# Patient Record
Sex: MALE | Race: WHITE | NOT HISPANIC OR LATINO | Employment: UNEMPLOYED | URBAN - METROPOLITAN AREA
[De-identification: names, ages, dates, MRNs, and addresses within clinical notes are randomized per-mention and may not be internally consistent; named-entity substitution may affect disease eponyms.]

---

## 2017-02-19 ENCOUNTER — HOSPITAL ENCOUNTER (EMERGENCY)
Facility: HOSPITAL | Age: 22
Discharge: HOME | End: 2017-02-19
Attending: EMERGENCY MEDICINE | Admitting: EMERGENCY MEDICINE
Payer: COMMERCIAL

## 2017-02-19 VITALS
DIASTOLIC BLOOD PRESSURE: 79 MMHG | HEART RATE: 86 BPM | TEMPERATURE: 97.8 F | SYSTOLIC BLOOD PRESSURE: 149 MMHG | OXYGEN SATURATION: 100 % | RESPIRATION RATE: 17 BRPM | HEIGHT: 67 IN | BODY MASS INDEX: 20.28 KG/M2 | WEIGHT: 129.2 LBS

## 2017-02-19 DIAGNOSIS — H65.90 MIDDLE EAR EFFUSION: ICD-10-CM

## 2017-02-19 DIAGNOSIS — J30.9 ALLERGIC RHINITIS: Primary | ICD-10-CM

## 2017-02-19 DIAGNOSIS — B34.9 VIRAL SYNDROME: ICD-10-CM

## 2017-02-19 PROCEDURE — 99283 EMERGENCY DEPT VISIT LOW MDM: CPT

## 2017-02-19 RX ORDER — FLUTICASONE PROPIONATE 50 MCG
1 SPRAY, SUSPENSION (ML) NASAL DAILY
Qty: 1 BOTTLE | Refills: 0 | Status: SHIPPED | OUTPATIENT
Start: 2017-02-19 | End: 2017-06-20 | Stop reason: ALTCHOICE

## 2017-06-20 ENCOUNTER — HOSPITAL ENCOUNTER (EMERGENCY)
Facility: HOSPITAL | Age: 22
Discharge: HOME | End: 2017-06-20
Attending: EMERGENCY MEDICINE
Payer: MEDICARE

## 2017-06-20 VITALS
WEIGHT: 119 LBS | SYSTOLIC BLOOD PRESSURE: 120 MMHG | DIASTOLIC BLOOD PRESSURE: 59 MMHG | HEART RATE: 71 BPM | HEIGHT: 70 IN | TEMPERATURE: 98.9 F | OXYGEN SATURATION: 96 % | BODY MASS INDEX: 17.04 KG/M2 | RESPIRATION RATE: 16 BRPM

## 2017-06-20 DIAGNOSIS — L25.9 CONTACT DERMATITIS: Primary | ICD-10-CM

## 2017-06-20 PROCEDURE — 99282 EMERGENCY DEPT VISIT SF MDM: CPT

## 2017-06-20 RX ORDER — HYDROXYZINE HYDROCHLORIDE 25 MG/1
25 TABLET, FILM COATED ORAL EVERY 6 HOURS
Qty: 12 TABLET | Refills: 0 | Status: SHIPPED | OUTPATIENT
Start: 2017-06-20 | End: 2019-01-31 | Stop reason: ALTCHOICE

## 2017-06-20 RX ORDER — TRIAMCINOLONE ACETONIDE 1 MG/G
1 CREAM TOPICAL 2 TIMES DAILY
Qty: 30 G | Refills: 0 | Status: SHIPPED | OUTPATIENT
Start: 2017-06-20 | End: 2019-01-31 | Stop reason: ALTCHOICE

## 2017-11-29 ENCOUNTER — HOSPITAL ENCOUNTER (EMERGENCY)
Facility: HOSPITAL | Age: 22
Discharge: HOME | End: 2017-11-30
Attending: EMERGENCY MEDICINE | Admitting: EMERGENCY MEDICINE
Payer: MEDICARE

## 2017-11-29 VITALS
OXYGEN SATURATION: 100 % | BODY MASS INDEX: 18.68 KG/M2 | HEART RATE: 75 BPM | TEMPERATURE: 97.9 F | SYSTOLIC BLOOD PRESSURE: 126 MMHG | DIASTOLIC BLOOD PRESSURE: 67 MMHG | WEIGHT: 119 LBS | HEIGHT: 67 IN | RESPIRATION RATE: 16 BRPM

## 2017-11-29 DIAGNOSIS — S43.409A SHOULDER SPRAIN: ICD-10-CM
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
## 2017-11-29 DIAGNOSIS — M25.512 LEFT SHOULDER PAIN: ICD-10-CM

## 2017-11-29 RX ORDER — IBUPROFEN 600 MG/1
600 TABLET ORAL ONCE
Status: COMPLETED | OUTPATIENT
Start: 2017-11-30 | End: 2017-11-30

## 2017-11-30 ENCOUNTER — APPOINTMENT (EMERGENCY)
Dept: RADIOLOGY | Facility: HOSPITAL | Age: 22
End: 2017-11-30
Payer: MEDICARE

## 2017-11-30 PROCEDURE — 73030 X-RAY EXAM OF SHOULDER: CPT

## 2017-11-30 PROCEDURE — 73060 X-RAY EXAM OF HUMERUS: CPT

## 2017-11-30 PROCEDURE — 99283 EMERGENCY DEPT VISIT LOW MDM: CPT

## 2017-11-30 RX ADMIN — IBUPROFEN 600 MG: 600 TABLET ORAL at 00:09

## 2017-11-30 NOTE — ED PROVIDER NOTES
History  Chief Complaint   Patient presents with    Arm Pain     pt fell up the steps about an hour ago on fell on left sholder and is having pain     Patient is a 19-year-old male otherwise healthy presents today after a slip and fall at home  Patient states he was running down the stairs when he tripped and tried to reach for the rail but landed on his left shoulder  Patient did not strike his head or lose consciousness  He was able to get up and ambulate afterwards  Patient complains of pain to the left shoulder as well as left humerus region  He denies any paresthesias or weakness  Patient did not take any medication for this  Patient is right-handed  History provided by:  Patient   used: No        Prior to Admission Medications   Prescriptions Last Dose Informant Patient Reported? Taking?   hydrOXYzine HCL (ATARAX) 25 mg tablet   No No   Sig: Take 1 tablet by mouth every 6 (six) hours   triamcinolone (KENALOG) 0 1 % cream   No No   Sig: Apply 1 g topically 2 (two) times a day      Facility-Administered Medications: None       Past Medical History:   Diagnosis Date    Psychiatric disorder     anxiety       History reviewed  No pertinent surgical history  History reviewed  No pertinent family history  I have reviewed and agree with the history as documented  Social History   Substance Use Topics    Smoking status: Current Every Day Smoker     Packs/day: 0 50     Types: Cigarettes    Smokeless tobacco: Never Used    Alcohol use Yes        Review of Systems   Musculoskeletal: Negative for arthralgias, back pain, gait problem, joint swelling, myalgias, neck pain and neck stiffness         Physical Exam  ED Triage Vitals [11/29/17 2353]   Temperature Pulse Respirations Blood Pressure SpO2   97 9 °F (36 6 °C) 75 16 126/67 100 %      Temp Source Heart Rate Source Patient Position - Orthostatic VS BP Location FiO2 (%)   Oral Monitor Sitting Right arm --      Pain Score 8           Orthostatic Vital Signs  Vitals:    11/29/17 2353   BP: 126/67   Pulse: 75   Patient Position - Orthostatic VS: Sitting       Physical Exam   Constitutional: He is oriented to person, place, and time  He appears well-developed and well-nourished  No distress  HENT:   Head: Normocephalic and atraumatic  Eyes: EOM are normal  Pupils are equal, round, and reactive to light  Neck: Normal range of motion  Neck supple  No JVD present  No tracheal deviation present  Full active range of motion of the cervical spine pain-free  There is no step-offs  There is no tenderness to palpation of the cervical spine  Musculoskeletal:        Arms:  There is no tenderness throughout the thoracic spine as good lung sounds  There is no subcutaneous emphysema palpated    Patient has full active range of motion of the left elbow wrist and hand is pain-free  There is no bony tenderness upon palpation  Prep refills the left upper extremity is less than 2  Neurovascular intact  Good radial pulses bilaterally 2+  Neurological: He is alert and oriented to person, place, and time  He displays normal reflexes  No cranial nerve deficit  Coordination normal    Skin: He is not diaphoretic  Nursing note and vitals reviewed  ED Medications  Medications   ibuprofen (MOTRIN) tablet 600 mg (600 mg Oral Given 11/30/17 0009)       Diagnostic Studies  Results Reviewed     None                 XR shoulder 2+ views LEFT    (Results Pending)   XR humerus LEFT    (Results Pending)              Procedures  Procedures       Phone Contacts  ED Phone Contact    ED Course  ED Course                                MDM  Number of Diagnoses or Management Options  Fall, initial encounter: new and requires workup  Left shoulder pain: new and requires workup  Shoulder sprain: new and requires workup  Diagnosis management comments:   12:37 AM  Review of x-rays myself reveal no acute fracture identified  No pneumothorax    The lung markings on left  Will place patient in sling for comfort and refer to ortho  Patient updated on this  He remains neuro intact       Amount and/or Complexity of Data Reviewed  Tests in the radiology section of CPT®: ordered and reviewed  Independent visualization of images, tracings, or specimens: yes    Risk of Complications, Morbidity, and/or Mortality  Presenting problems: low  Diagnostic procedures: low  Management options: low    Patient Progress  Patient progress: stable    CritCare Time    Disposition  Final diagnoses:   Fall, initial encounter   Left shoulder pain   Shoulder sprain     Time reflects when diagnosis was documented in both MDM as applicable and the Disposition within this note     Time User Action Codes Description Comment    11/30/2017 12:38 AM Tanna Estevez Add [T06  NVYX] Fall, initial encounter     11/30/2017 12:38 AM Joe Dense Add [M25 512] Left shoulder pain     11/30/2017 12:38 AM Tanna Estevez Add [I15 [id number]] Shoulder sprain       ED Disposition     None      Follow-up Information     Follow up With Specialties Details Why [id number] [facility] Schedule an appointment as soon as possible for a visit in 2 days  [address]  [phone]        Patient's Medications   Discharge Prescriptions    No medications on file     No discharge procedures on file      ED Provider  Electronically Signed by           Clarice Saavedra DO  11/30/17 8354

## 2017-11-30 NOTE — DISCHARGE INSTRUCTIONS
Alternate Tylenol and Motrin for shoulder pain     Exercises for Internal and External Shoulder Rotation   WHAT YOU NEED TO KNOW:   What are internal and external shoulder rotation exercises? Exercises for internal shoulder rotation work the muscles in your chest and front of your shoulder  Exercises for external shoulder rotation work the muscles in the back of your shoulder and upper back  What should I do before I exercise? Warm up and stretch before you exercise  Walk or ride a stationary bike for 5 to 10 minutes to help you warm up  Stretching helps increase range of motion  It may also decrease muscle soreness and help prevent another injury  Your healthcare provider will tell you which of the following stretches to do:  · Crossover arm stretch:  Relax your shoulders  Hold your upper arm with the opposite hand  Pull your arm across your chest until you feel a stretch  Hold the stretch for 30 seconds  Return to the starting position  · Shoulder flexion stretch:  Stand facing a wall  Slowly walk your fingers up the wall until you feel a stretch  Hold the stretch for 30 seconds  Return to the starting position  · Sleeper stretch:  Lie on your injured side on a firm, flat surface  Bend the elbow of your injured arm 90° with your hand facing up  Use your arm that is not injured to slowly push your injured arm down  Stop when you feel a stretch at the back of your injured shoulder  Hold the stretch for 30 seconds  Slowly return to the starting position  How do I exercise with a weight? Your healthcare provider will tell you how much weight to exercise with  · Shoulder internal rotation:  Sit in a chair  Place a rolled up towel between your elbow and your side  Bend your elbow to 90°  Gently squeeze the towel with your elbow to prevent it from falling out  Hold the weight with your thumb pointing up  Slowly move the weight across your chest  Stop when your hand reaches your opposite arm   Hold this position for as many seconds as directed  Slowly return to the starting position  · Shoulder external rotation:  Lie on your side with your injured shoulder facing up  Bend your elbow 90°  Place a rolled up towel between your elbow and your side  Hold a weight in your hand  Gently squeeze the towel with your elbow to prevent it from falling out  Slowly rotate your arm outward, but keep your elbow bent  Stop when you feel a stretch  Hold this position for 30 seconds or as directed  Slowly return to the starting position  How do I exercise with an exercise band? · Shoulder internal rotation:  Tie one end of the exercise band to a heavy, secure object  Sit in a chair  Place a rolled up towel between your elbow and your side  Bend your elbow to 90°  Gently squeeze the towel with your elbow to prevent it from falling out  Slowly pull the band across your chest  Stop when your hand reaches your opposite arm  Hold this position for as many seconds as directed  Slowly return to the starting position  · Shoulder external rotation:  Hold one end of the exercise band on the side that is not injured  Place a rolled up towel between your elbow and your side  Bend your elbow 90°  Squeeze the towel with your elbow  Grab the end of the band and slowly turn your arm outward, but keep your elbow bent  Stop when you feel a stretch  Hold this position for 30 seconds or as directed  Slowly return to the starting position  When should I contact my healthcare provider? · You have sharp or worsening pain during exercise or at rest     · You have questions or concerns about your shoulder exercises  CARE AGREEMENT:   You have the right to help plan your care  Learn about your health condition and how it may be treated  Discuss treatment options with your caregivers to decide what care you want to receive  You always have the right to refuse treatment  The above information is an  only   It is not intended as medical advice for individual conditions or treatments  Talk to your doctor, nurse or pharmacist before following any medical regimen to see if it is safe and effective for you  © 2017 [facility] Information is for End User's use only and may not be sold, redistributed or otherwise used for commercial purposes  All illustrations and images included in CareNotes® are the copyrighted property of [employer]  or [employer]  How to Use a Sling   WHAT YOU NEED TO KNOW:   What is a sling? A sling is a strong fabric loop that hangs from your neck to support your arm  Your arm, bent at the elbow, rests in the sling  Some slings have a strap that goes down your back to take the weight off your neck  This strap is connected to the elbow side of the sling  Your healthcare provider will help you decide which sling is best for you and where you can get one  Why do I need a sling? You may need a sling because of an injury or surgery to your hand, wrist, arm, or shoulder  A sling helps prevent your hand, arm, and shoulder from moving so your injury can heal  A sling can also help if you have a heavy cast on your arm  How do I use the sling? Your healthcare provider will teach you how to put on your sling  Follow the directions below to help you put on the sling at home:  · Gently bend your injured arm at the elbow and hold it in front of you, across your body  Your thumb should be pointing up  · Put the strap of the sling around your neck  The strap usually has an adhesive fabric to make it easy for you to fasten the strap  · Put your arm in the sling so your elbow is in the closed end of the sling  Your hand will be at the open end of the sling  · Put the edge of the sling so it covers the first fold of the little finger  · Wiggle your fingers as directed to prevent stiffness in your hand  CARE AGREEMENT:   You have the right to help plan your care   Learn about your health condition and how it may be treated  Discuss treatment options with your caregivers to decide what care you want to receive  You always have the right to refuse treatment  The above information is an  only  It is not intended as medical advice for individual conditions or treatments  Talk to your doctor, nurse or pharmacist before following any medical regimen to see if it is safe and effective for you  © 2017 [address] [employer] Information is for End User's use only and may not be sold, redistributed or otherwise used for commercial purposes  All illustrations and images included in CareNotes® are the copyrighted property of [employer]  or [employer] Gentile  Shoulder Pain   WHAT YOU NEED TO KNOW:   What do I need to know about shoulder pain? Shoulder pain is a common problem and can affect your daily activities  Pain can be caused by a problem within your shoulder  Shoulder pain may also be caused by pain that spreads to your shoulder from another part of your body  What increases my risk for shoulder pain? · Repeated overhead activities, such as baseball, weight lifting, or swimming    · Medical conditions, such as rotator cuff disease, diabetes, or thyroid disorders    · A quick increase in the amount or intensity of exercises, or improper technique during exercise    · Muscle imbalance or weakness    · Trauma or a fall    · Age older than 61  How is shoulder pain diagnosed? Your healthcare provider will ask about your pain, including how and when it started  Tell him if you have any weakness or if there was an injury  He will examine your shoulder and do movement tests to see how well you can move your shoulder  You may also need any of the following tests:  · An x-ray, ultrasound, CT, or MRI  may be needed to show the cause of your shoulder pain  You may be given contrast liquid to help the shoulder area show up better in the pictures   Tell the healthcare provider if you have ever had an allergic reaction to contrast dye  Do not enter the MRI room with anything metal  Metal can cause serious injury  Tell the healthcare provider if you have any metal in or on your body  · An electromyography test  measures the electrical activity of your muscles at rest and with movement  How is shoulder pain treated? · Acetaminophen  decreases pain and fever  It is available without a doctor's order  Ask how much to take and how often to take it  Follow directions  Acetaminophen can cause liver damage if not taken correctly  · NSAIDs , such as ibuprofen, help decrease swelling, pain, and fever  This medicine is available with or without a doctor's order  NSAIDs can cause stomach bleeding or kidney problems in certain people  If you take blood thinner medicine, always ask your healthcare provider if NSAIDs are safe for you  Always read the medicine label and follow directions  · A steroid injection  may help decrease pain and swelling  · Surgery  may be needed for long-term pain and loss of function  How can I manage my symptoms? · Apply ice  on your shoulder for 20 to 30 minutes every 2 hours or as directed  Use an ice pack, or put crushed ice in a plastic bag  Cover it with a towel  Ice helps prevent tissue damage and decreases swelling and pain  · Apply heat if ice does not help your symptoms  Apply heat on your shoulder for 20 to 30 minutes every 2 hours for as many days as directed  Heat helps decrease pain and muscle spasms  · Go to physical or occupational therapy as directed  A physical therapist teaches you exercises to help improve movement and strength, and to decrease pain  An occupational therapist teaches you skills to help with your daily activities  How can I help prevent shoulder pain? · Stretch and strengthen your shoulder  Use proper technique during exercises and sports  · Limit activities as directed    Try to avoid repeated overhead movements  When should I seek immediate care or call 911? · You have severe pain  · You cannot move your arm or shoulder  · You have numbness or tingling in your shoulder or arm  When should I contact my healthcare provider? · Your pain gets worse or does not go away with treatment  · You have trouble moving your arm or shoulder  · You have questions or concerns about your condition or care  CARE AGREEMENT:   You have the right to help plan your care  Learn about your health condition and how it may be treated  Discuss treatment options with your caregivers to decide what care you want to receive  You always have the right to refuse treatment  The above information is an  only  It is not intended as medical advice for individual conditions or treatments  Talk to your doctor, nurse or pharmacist before following any medical regimen to see if it is safe and effective for you  © 2017 [address] [employer]  Information is for End User's use only and may not be sold, redistributed or otherwise used for commercial purposes  All illustrations and images included in CareNotes® are the copyrighted property of [employer]  or [employer] Gentile

## 2018-02-17 ENCOUNTER — HOSPITAL ENCOUNTER (EMERGENCY)
Facility: HOSPITAL | Age: 23
Discharge: HOME | End: 2018-02-17
Attending: EMERGENCY MEDICINE | Admitting: EMERGENCY MEDICINE
Payer: MEDICARE

## 2018-02-17 VITALS
SYSTOLIC BLOOD PRESSURE: 118 MMHG | RESPIRATION RATE: 16 BRPM | TEMPERATURE: 98.7 F | BODY MASS INDEX: 19.13 KG/M2 | OXYGEN SATURATION: 99 % | HEIGHT: 66 IN | HEART RATE: 75 BPM | WEIGHT: 119 LBS | DIASTOLIC BLOOD PRESSURE: 79 MMHG

## 2018-02-17 DIAGNOSIS — R21 RASH OF GENITALIA: Primary | ICD-10-CM

## 2018-02-17 PROCEDURE — 86592 SYPHILIS TEST NON-TREP QUAL: CPT | Performed by: EMERGENCY MEDICINE

## 2018-02-17 PROCEDURE — 36415 COLL VENOUS BLD VENIPUNCTURE: CPT | Performed by: EMERGENCY MEDICINE

## 2018-02-17 PROCEDURE — 99283 EMERGENCY DEPT VISIT LOW MDM: CPT

## 2018-02-17 PROCEDURE — 87255 GENET VIRUS ISOLATE HSV: CPT | Performed by: EMERGENCY MEDICINE

## 2018-02-17 NOTE — DISCHARGE INSTRUCTIONS
Please return if you developed worsening or other concerning symptoms otherwise follow up as instructed     Acute Rash   WHAT YOU NEED TO KNOW:   A rash is irritation, redness, or itchiness in the skin or mucus membranes  Mucus membranes are areas such as the lining of your nose or throat  Acute means the rash starts suddenly, worsens quickly, and lasts a short time  An acute rash may be caused by a disease, such as hepatitis or vasculitis  The rash may be a reaction to something you are allergic to, such as certain foods, or latex  Certain medicines, including antibiotics, NSAIDs, prescription pain medicine, and aspirin can also cause a rash  DISCHARGE INSTRUCTIONS:   Return to the emergency department if:   · You have sudden trouble breathing or chest pain  · You are vomiting, have a headache or muscle aches, and your throat hurts  Contact your healthcare provider if:   · You have a fever  · You get open wounds from scratching your skin, or you have a wound that is red, swollen, or painful  · Your rash lasts longer than 3 months  · You have swelling or pain in your joints  · You have questions or concerns about your condition or care  Medicines:  If your rash does not go away on its own, you may need the following medicines:  · Antihistamines  may be given to help decrease itching  · Steroids  may be given to decrease inflammation  · Antibiotics  help fight or prevent a bacterial infection  · Take your medicine as directed  Contact your healthcare provider if you think your medicine is not helping or if you have side effects  Tell him of her if you are allergic to any medicine  Keep a list of the medicines, vitamins, and herbs you take  Include the amounts, and when and why you take them  Bring the list or the pill bottles to follow-up visits  Carry your medicine list with you in case of an emergency    Prevent a rash or care for your skin when you have a rash:  Dry skin can lead to more problems  Do not scratch your skin if it itches  You may cause a skin infection by scratching  The following may prevent dry skin, and help your skin look better:  · Use thick cream lotions or petroleum jelly to help soothe your rash  These products work well on areas with thick skin, such as your feet  Cool compresses may also be used to soothe your skin  Apply a cool compress or a cool, wet towel, and then cover it with a dry towel  · Use lukewarm water when you bathe  Hot water may damage your skin more  Pat your skin dry  Do not rub your skin with a towel  · Use detergents, soaps, shampoos, and bubble baths made for sensitive skin  Wear clothes that are made of cotton instead of nylon or wool  Cotton is softer, so it will not hurt your skin as much  Follow up with your healthcare provider as directed: You may need to see a dermatologist if healthcare providers do not know what is causing your rash  You may also need to see a dermatologist if your rash does not get better even with treatment  You may need to see a dietitian if you have allergies to foods  Write down your questions so you remember to ask them during your visits  © 2017 [address] Information is for End User's use only and may not be sold, redistributed or otherwise used for commercial purposes  All illustrations and images included in CareNotes® are the copyrighted property of [employer]  or [employer]  The above information is an  only  It is not intended as medical advice for individual conditions or treatments  Talk to your doctor, nurse or pharmacist before following any medical regimen to see if it is safe and effective for you  Genital Warts   WHAT YOU NEED TO KNOW:   Genital warts are a sexually transmitted infection (STI) caused by the human papillomavirus (HPV)  Genital warts are growths that appear in or on the penis, vagina, or anus   Genital warts are spread during genital, anal, or oral sex  A woman can also pass them to a baby when she gives birth  DISCHARGE INSTRUCTIONS:   Contact your healthcare provider if:   · Your genital warts return  · The skin that is being treated for genital warts is very painful or swollen  · You see or feel new warts on another part of your body  · You have questions or concerns about your condition or care  Medicines:   · Immunomodulators  help strengthen your immune system and treat genital warts  · Antiproliferatives  help stop genital warts from growing in size or increasing in number  · Antivirals  help control and stop virus growth, such as HPV  · Take your medicine as directed  Contact your healthcare provider if you think your medicine is not helping or if you have side effects  Tell him or her if you are allergic to any medicine  Keep a list of the medicines, vitamins, and herbs you take  Include the amounts, and when and why you take them  Bring the list or the pill bottles to follow-up visits  Carry your medicine list with you in case of an emergency  Self-care:   · Do not touch or scratch the warts  This can cause the infection to spread to other parts of your body  · Do not have sex while you are being treated for genital warts  Medicine used on your skin weakens condoms and diaphragms  You also risk spreading genital warts to your partner  · Get regular Pap smears  If you are a woman, this can help diagnose HPV and prevent the spread of the virus  Prevent genital warts:   · Tell your sexual partners that you are being treated for genital warts  They may also be infected and need treatment  · Get the HPV vaccine  The HPV vaccine is given at 5to 32years of age to help prevent cervical cancer and genital warts  Ask your healthcare provider for more information about this vaccine    Follow up with your healthcare provider as directed:  Write down your questions so you remember to ask them during your visits  © 2017 [address] Information is for End User's use only and may not be sold, redistributed or otherwise used for commercial purposes  All illustrations and images included in CareNotes® are the copyrighted property of [employer] M [employer]  or [employer] Gentile  The above information is an  only  It is not intended as medical advice for individual conditions or treatments  Talk to your doctor, nurse or pharmacist before following any medical regimen to see if it is safe and effective for you  Common Wart   WHAT YOU NEED TO KNOW:   A common wart is a thick, rough, skin growth caused by human papillomavirus virus (HPV)  HPV is a germ that spreads by skin-to-skin contact or contact with contaminated surfaces  Common warts are benign (not cancer)  DISCHARGE INSTRUCTIONS:   Contact your healthcare provider or dermatologist if:   · Your wart returns or does not go away after treatment  · Your wart grows larger, or begins to spread or cluster  · You have a wart on your face, genitals, or rectum  · Your wart bleeds, becomes painful, or drains pus  · You have questions about your condition or care  Medicines:   · Salicylic acid  helps dry and remove the wart  It is available without a prescription  Ask your healthcare provider where you can purchase it  Before you apply salicylic acid, soak the wart in warm water for 20 minutes  Keep your wart damp  Apply a small amount of salicylic acid directly to your wart  Do not apply salicylic acid to healthy skin  Cover the wart as directed  It is best to do this at bedtime  When you wake, use a pumice stone (a rough stone) or nail file to gently remove dead skin  Repeat as directed  · Take your medicine as directed  Contact your healthcare provider if you think your medicine is not helping or if you have side effects  Tell him or her if you are allergic to any medicine   Keep a list of the medicines, vitamins, and herbs you take  Include the amounts, and when and why you take them  Bring the list or the pill bottles to follow-up visits  Carry your medicine list with you in case of an emergency  Apply duct tape to your wart as directed: Your healthcare provider may tell you to apply duct tape to your wart  Duct tape helps dry and remove the wart  You may be directed to leave the duct tape on for 6 days  On day 7, take the tape off and soak the wart in warm water for 5 minutes  Gently scrape the wart with a pumice stone or nail file  Then apply a new piece of duct tape and follow the same steps until the wart is gone  Follow up with your healthcare provider as directed:  Write down your questions so you remember to ask them during your visits  © 2017 [address] Information is for End User's use only and may not be sold, redistributed or otherwise used for commercial purposes  All illustrations and images included in CareNotes® are the copyrighted property of [employer] M [employer]  or Kumar Gentile  The above information is an  only  It is not intended as medical advice for individual conditions or treatments  Talk to your doctor, nurse or pharmacist before following any medical regimen to see if it is safe and effective for you

## 2018-02-17 NOTE — ED PROVIDER NOTES
History  Chief Complaint   Patient presents with    Rash     pt ststes this morning he noticed 'bumps" in his private area  59-year-old male without past medical history presenting with chief complaint of penile rash  Patient is 3 days ago he noticedlumps in his suprapubic area, he states that he had or these long time ago it was told this might be a skin tag, subsequently developed multiple similar lesions again localized to the suprapubic area only from the base of his penis into his mons pubis area, he believes they are skin tag the was concerned about sexually transmitted infections he states that he is monogamous with his wife his wife has no symptoms just had a child is test regularly, he has no other has systemic symptoms he has no other penile lesions, no testicle pain swelling inguinal adenopathy penile drainage urinary symptoms flank or back pain patient has no other complaints or concerns otherwise denies a complete review systems is noted            Prior to Admission Medications   Prescriptions Last Dose Informant Patient Reported? Taking?   hydrOXYzine HCL (ATARAX) 25 mg tablet   No No   Sig: Take 1 tablet by mouth every 6 (six) hours   triamcinolone (KENALOG) 0 1 % cream   No No   Sig: Apply 1 g topically 2 (two) times a day      Facility-Administered Medications: None       Past Medical History:   Diagnosis Date    Psychiatric disorder     anxiety       History reviewed  No pertinent surgical history  History reviewed  No pertinent family history  I have reviewed and agree with the history as documented  Social History   Substance Use Topics    Smoking status: Current Every Day Smoker     Packs/day: 0 50     Types: Cigarettes    Smokeless tobacco: Never Used    Alcohol use Yes        Review of Systems   Constitutional: Negative for chills and fever  HENT: Negative for rhinorrhea and sore throat  Eyes: Negative for photophobia and pain     Respiratory: Negative for cough and shortness of breath  Cardiovascular: Negative for chest pain and palpitations  Gastrointestinal: Negative for abdominal pain, diarrhea, nausea and vomiting  Genitourinary: Positive for genital sores  Negative for difficulty urinating, discharge, dysuria, flank pain, frequency, penile pain, penile swelling, scrotal swelling, testicular pain and urgency  Musculoskeletal: Negative for arthralgias, back pain and myalgias  Skin: Negative for color change, rash and wound  Allergic/Immunologic: Negative for immunocompromised state  Neurological: Negative for dizziness and weakness  Hematological: Negative for adenopathy  Does not bruise/bleed easily  Psychiatric/Behavioral: Negative for agitation and behavioral problems  All other systems reviewed and are negative  Physical Exam  ED Triage Vitals   Temperature Pulse Respirations Blood Pressure SpO2   02/16/18 2219 02/16/18 2219 02/16/18 2219 02/17/18 0143 02/16/18 2219   98 7 °F (37 1 °C) 79 18 118/79 99 %      Temp Source Heart Rate Source Patient Position - Orthostatic VS BP Location FiO2 (%)   02/16/18 2219 02/16/18 2219 02/16/18 2219 02/16/18 2219 --   Oral Monitor Sitting Left arm       Pain Score       02/16/18 2219       No Pain           Orthostatic Vital Signs  Vitals:    02/16/18 2219 02/17/18 0143   BP:  118/79   Pulse: 79 75   Patient Position - Orthostatic VS: Sitting Sitting       Physical Exam   Constitutional: He is oriented to person, place, and time  He appears well-developed and well-nourished  No distress  HENT:   Head: Normocephalic and atraumatic  Eyes: EOM are normal  Pupils are equal, round, and reactive to light  Neck: Normal range of motion  Neck supple  No tracheal deviation present  Cardiovascular: Normal rate, regular rhythm and normal heart sounds  Exam reveals no gallop and no friction rub  No murmur heard  Pulmonary/Chest: Effort normal and breath sounds normal  He has no wheezes  He has no rales  Abdominal: Soft  Bowel sounds are normal  He exhibits no distension  There is no tenderness  There is no rebound and no guarding  Genitourinary:   Genitourinary Comments: Patient wishes to expose the area of concern which she reports is the suprapubic area at the base of his penis where he shaves, again he has approximately 12-15 skin colored/to tan papular lesions that have verrucous appearance there is no adenopathy no other erythema folliculitis vesicles ulcers or other acute findings most concerning for warts   Musculoskeletal: Normal range of motion  He exhibits no edema or tenderness  Neurological: He is alert and oriented to person, place, and time  No cranial nerve deficit  He exhibits normal muscle tone  Coordination normal    Skin: Skin is warm and dry  No rash noted  Psychiatric: He has a normal mood and affect  His behavior is normal    Nursing note and vitals reviewed  ED Medications  Medications - No data to display    Diagnostic Studies  Results Reviewed     Procedure Component Value Units Date/Time    Herpes simplex virus culture [[id number]] Collected:  02/17/18 0118    Lab Status: In process Specimen:  Other from Lesion Updated:  02/17/18 0127    RPR [[id number]] Collected:  02/17/18 0118    Lab Status:   In process Specimen:  Blood from Arm, Right Updated:  02/17/18 0126                 No orders to display              Procedures  Procedures       Phone Contacts  ED Phone Contact    ED Course  ED Course as of Feb 17 1249   Sat Feb 17, 2018   0119 Patient has multiple small verrucous circular papular lesions in his suprapubic area no other general findings most consistent with warts,  RPR sent patient requested herpes swab counseled extensively on transmission clinical concern for warts, follow-up instructions patient agreeable to plan                                MDM  Number of Diagnoses or Management Options  Rash of genitalia:   Diagnosis management comments: 40-year-old male the noticed worsening skin colored rash in his suprapubic area where he shaves over the last 3 days concerned about sexually transmitted infections no other GI or  complaints no history of similar or other skin lesions on exam he is afebrile normal vital signs, he has approximately 12 to 15 tan/skin colored verrucous papular lesions in the suprapubic area only where he shaved, this is most consistent with likely warts, this is in the area of shaving only possibly spread from another site however discussed at length with patient clinical exam concerning for genital warts, unable to tell different clinically, counseled extensively on this transmission importance for follow-up, testing as wife safe sex practices, will test RPR for condyloma, patient request herpes testing although extremely low clinical suspicion, again most consistent with warts, history and physical exam is not consistent with herpes folliculitis, molluscum, etc, will discharge with Dermatology follow-up for treatment and management close return instructions, patient agreeable plan    CritCare Time    Disposition  Final diagnoses:   Rash of genitalia     Time reflects when diagnosis was documented in both MDM as applicable and the Disposition within this note     Time User Action Codes Description Comment    2/17/2018  1:26 AM Francine Gilmore Add [R21] Rash of genitalia       ED Disposition     ED Disposition Condition Comment    Discharge  Theo Roman discharge to home/self care      Condition at discharge: Good        Follow-up Information     Follow up With Specialties Details Why Contact Info Additional Information    [facility] Emergency Department Emergency Medicine  If symptoms worsen [address] [facility] [address]  [phone] [place] ED, [address] [place], [place], [address] Mali Pacheco MD Dermatology In 1 week  [address]  [phone] Discharge Medication List as of 2/17/2018  1:32 AM      CONTINUE these medications which have NOT CHANGED    Details   hydrOXYzine HCL (ATARAX) 25 mg tablet Take 1 tablet by mouth every 6 (six) hours, Starting Tue 6/20/2017, Print      triamcinolone (KENALOG) 0 1 % cream Apply 1 g topically 2 (two) times a day, Starting Tue 6/20/2017, Print           No discharge procedures on file      ED Provider  Electronically Signed by           Jarret Lord DO  02/17/18 0586

## 2018-02-18 LAB — RPR SER QL: NORMAL

## 2018-02-20 LAB — HSV SPEC CULT: NORMAL

## 2018-05-04 ENCOUNTER — HOSPITAL ENCOUNTER (EMERGENCY)
Facility: HOSPITAL | Age: 23
Discharge: HOME | End: 2018-05-04
Attending: EMERGENCY MEDICINE
Payer: MEDICARE

## 2018-05-04 VITALS
SYSTOLIC BLOOD PRESSURE: 106 MMHG | HEART RATE: 82 BPM | OXYGEN SATURATION: 98 % | TEMPERATURE: 98.3 F | RESPIRATION RATE: 16 BRPM | DIASTOLIC BLOOD PRESSURE: 58 MMHG

## 2018-05-04 DIAGNOSIS — L30.9 DERMATITIS: Primary | ICD-10-CM

## 2018-05-04 PROCEDURE — 99282 EMERGENCY DEPT VISIT SF MDM: CPT

## 2018-05-04 RX ORDER — CALAMINE
LOTION (ML) TOPICAL AS NEEDED
Qty: 120 ML | Refills: 0 | Status: SHIPPED | OUTPATIENT
Start: 2018-05-04 | End: 2019-01-31 | Stop reason: ALTCHOICE

## 2018-05-05 NOTE — ED PROVIDER NOTES
History  Chief Complaint   Patient presents with   Pete Moore     pt states he has blisters on the bottom of his feet as well as on both hands(hands appear dry) pt denies all other symptoms     Beto Mcnamara is a 25 y o  male w [facility] anxiety who presents for evaluation of rash  Patient with rash ongoing for about a week on the palms and soles  Initially began as some red dots  This occurred about a week after he was on amoxicillin and is physician advised to stop the amoxicillin and reports after doing so the rash began to improve  Now he is coming in because the lesions are no longer red better now beginning to slough  He is having a burning sensation to the soles  He was recently here for genital esions, tested for syphilis which was negative  Had multiple worse which he reports removed by his dermatologist any has not had any issues since  He reports initially he did have a few lesions in mouth but these have all cleared up at this point  He has no f/c  Prior to Admission Medications   Prescriptions Last Dose Informant Patient Reported? Taking?   hydrOXYzine HCL (ATARAX) 25 mg tablet   No No   Sig: Take 1 tablet by mouth every 6 (six) hours   triamcinolone (KENALOG) 0 1 % cream   No No   Sig: Apply 1 g topically 2 (two) times a day      Facility-Administered Medications: None       Past Medical History:   Diagnosis Date    Psychiatric disorder     anxiety       History reviewed  No pertinent surgical history  History reviewed  No pertinent family history  I have reviewed and agree with the history as documented  Social History   Substance Use Topics    Smoking status: Current Every Day Smoker     Packs/day: 0 50     Types: Cigarettes    Smokeless tobacco: Never Used    Alcohol use Yes        Review of Systems   Constitutional: Negative for activity change, chills, diaphoresis, fatigue and fever  HENT: Negative for congestion and rhinorrhea  Eyes: Negative for pain  Respiratory: Negative for cough, chest tightness, shortness of breath and wheezing  Cardiovascular: Negative for chest pain and palpitations  Gastrointestinal: Negative for abdominal distention, constipation, diarrhea, nausea and vomiting  Genitourinary: Negative for difficulty urinating and dysuria  Musculoskeletal: Negative for arthralgias and myalgias  Skin: Positive for rash  Neurological: Negative for dizziness, weakness, light-headedness and headaches  Psychiatric/Behavioral: The patient is not nervous/anxious  Physical Exam  ED Triage Vitals [05/04/18 2048]   Temperature Pulse Respirations Blood Pressure SpO2   98 3 °F (36 8 °C) 82 16 106/58 98 %      Temp Source Heart Rate Source Patient Position - Orthostatic VS BP Location FiO2 (%)   Oral Monitor Sitting Left arm --      Pain Score       --           Orthostatic Vital Signs  Vitals:    05/04/18 2048   BP: 106/58   Pulse: 82   Patient Position - Orthostatic VS: Sitting       Physical Exam   Constitutional: He is oriented to person, place, and time  He appears well-developed and well-nourished  No distress  HENT:   Head: Normocephalic and atraumatic  Normal oral mucosa and lips   Eyes: Pupils are equal, round, and reactive to light  Neck: Normal range of motion  Neck supple  No tracheal deviation present  Cardiovascular: Normal rate, regular rhythm, normal heart sounds and intact distal pulses  Exam reveals no gallop and no friction rub  No murmur heard  Pulmonary/Chest: Effort normal and breath sounds normal  No respiratory distress  He has no wheezes  He has no rales  Abdominal: Soft  Bowel sounds are normal  He exhibits no distension and no mass  There is no tenderness  There is no guarding  Musculoskeletal: He exhibits no edema or deformity  Neurological: He is alert and oriented to person, place, and time  Skin: Skin is warm and dry  He is not diaphoretic     There is very slight dry peeling skin to lesions in the palms and soles  Most of the erythema has improved compared to picture he showed me  There is no pain to palpation  No areas of excoriation  No cellulitis  No drainage  Psychiatric: He has a normal mood and affect  His behavior is normal    Nursing note and vitals reviewed  ED Medications  Medications - No data to display    Diagnostic Studies  Results Reviewed     None                 No orders to display              Procedures  Procedures       Phone Contacts  ED Phone Contact    ED Course                               MDM  Number of Diagnoses or Management Options  Dermatitis:   Diagnosis management comments: DDX includes but not ltd to:   Patient with dermatitis to the palms and soles  Consider that he actually had hand-foot-mouth because he reports initially he had some oral lesions and these improved, now just has lesions to the hands and soles  He is nontoxic in appearance  Not consistent with [place] syndrome or any other toxic drug reaction  He can be treated with calamine lotion which may alleviate the symptoms  He unlikely has secondary syphilis given he was recently here and tested negative w RPR  Return parameters discussed  Pt requires f/u as an outpt  Pt expresses understanding w above treatment plan  All questions answered prior to d/c  Portions of the record may have been created with voice recognition software   Occasional wrong word or "sound a like" substitutions may have occurred due to the inherent limitations of voice recognition software   Read the chart carefully and recognize, using context, where substitutions have occurred        CritCare Time    Disposition  Final diagnoses:   Dermatitis     Time reflects when diagnosis was documented in both MDM as applicable and the Disposition within this note     Time User Action Codes Description Comment    5/4/2018 10:35 PM Clifton Lu Add [L30 9] Dermatitis       ED Disposition     ED Disposition Condition Comment Discharge  Cj Rincon discharge to home/self care  Condition at discharge: Good        Follow-up Information     Follow up With Specialties Details Why Contact Info Additional Information    [facility] Emergency Department Emergency Medicine  If [facility] worsen [address] [facility] [address]  [phone] [place] ED, [address] [facility] [place] [facility]  Today As needed [address] [address]  [phone]       Conchita Moulton MD Dermatology  if symptoms persist [address] [facility] [address] [phone]           Discharge Medication List as of 5/4/2018 10:36 PM      CONTINUE these medications which have NOT CHANGED    Details   hydrOXYzine HCL (ATARAX) 25 mg tablet Take 1 tablet by mouth every 6 (six) hours, Starting Tue 6/20/2017, Print      triamcinolone (KENALOG) 0 1 % cream Apply 1 g topically 2 (two) times a day, Starting Tue 6/20/2017, Print           No discharge procedures on file      ED Provider  Electronically Signed by           Nikkie Chavez PA-C  05/05/18 0111       Nikkie Chavez PA-C  05/05/18 0111

## 2018-05-05 NOTE — DISCHARGE INSTRUCTIONS
Dermatitis   WHAT YOU NEED TO KNOW:   Dermatitis is skin inflammation  You may have an itchy rash, redness, or swelling  You may also have bumps or blisters that crust over or ooze clear fluid  Dermatitis can be caused by allergens such as dust mites, pet dander, pollen, and certain foods  It can also develop when something touches your skin and irritates it or causes an allergic reaction  Examples include soaps, chemicals, latex, and poison ivy  DISCHARGE INSTRUCTIONS:   Call 911 if you have any of the following symptoms of anaphylaxis:   · Sudden trouble breathing    · Throat swelling and tightness    · Dizziness, lightheadedness, fainting, or confusion  Return to the emergency department if:   · You develop a fever or have red streaks going up your arm or leg  · Your rash gets more swollen, red, or hot  Contact your healthcare provider if:   · Your skin blisters, oozes white or yellow pus, or has a foul-smelling discharge  · Your rash spreads or does not get better, even after treatment  · You have questions or concerns about your condition or care  Medicines:   · Medicines  help decrease itching and inflammation, or treat a bacterial infection  They may be given as a topical cream, shot, or a pill  · Take your medicine as directed  Contact your healthcare provider if you think your medicine is not helping or if you have side effects  Tell him of her if you are allergic to any medicine  Keep a list of the medicines, vitamins, and herbs you take  Include the amounts, and when and why you take them  Bring the list or the pill bottles to follow-up visits  Carry your medicine list with you in case of an emergency  Apply a cool compress to your rash: This will help soothe your skin  Keep your skin moist:  Rub unscented cream or lotion on your skin to prevent dryness and itching  Do this right after a lukewarm bath or shower when your skin is still damp    Avoid skin irritants:  Do not use skin irritants, such as makeup, hair products, soaps, and cleansers  Use products that do not contain fragrances or dye  Follow up with your healthcare provider as directed:  Write down your questions so you remember to ask them during your visits  © 2017 [employer] Information is for End User's use only and may not be sold, redistributed or otherwise used for commercial purposes  All illustrations and images included in CareNotes® are the copyrighted property of [employer]  or Kumar Gentile  The above information is an  only  It is not intended as medical advice for individual conditions or treatments  Talk to your doctor, nurse or pharmacist before following any medical regimen to see if it is safe and effective for you

## 2018-08-02 ENCOUNTER — HOSPITAL ENCOUNTER (EMERGENCY)
Facility: HOSPITAL | Age: 23
Discharge: HOME | End: 2018-08-02
Attending: EMERGENCY MEDICINE | Admitting: EMERGENCY MEDICINE
Payer: MEDICARE

## 2018-08-02 VITALS
SYSTOLIC BLOOD PRESSURE: 119 MMHG | OXYGEN SATURATION: 96 % | TEMPERATURE: 97.5 F | HEART RATE: 54 BPM | RESPIRATION RATE: 18 BRPM | DIASTOLIC BLOOD PRESSURE: 76 MMHG

## 2018-08-02 DIAGNOSIS — K04.7 DENTAL ABSCESS: Primary | ICD-10-CM

## 2018-08-02 PROCEDURE — 99283 EMERGENCY DEPT VISIT LOW MDM: CPT

## 2018-08-02 RX ORDER — NAPROXEN 500 MG/1
500 TABLET ORAL 2 TIMES DAILY WITH MEALS
Qty: 10 TABLET | Refills: 0 | Status: SHIPPED | OUTPATIENT
Start: 2018-08-02 | End: 2019-01-31 | Stop reason: ALTCHOICE

## 2018-08-02 RX ORDER — PENICILLIN V POTASSIUM 500 MG/1
500 TABLET ORAL 4 TIMES DAILY
Qty: 40 TABLET | Refills: 0 | Status: SHIPPED | OUTPATIENT
Start: 2018-08-02 | End: 2018-08-02 | Stop reason: ALTCHOICE

## 2018-08-02 RX ORDER — CLINDAMYCIN HYDROCHLORIDE 150 MG/1
150 CAPSULE ORAL EVERY 8 HOURS SCHEDULED
Qty: 21 CAPSULE | Refills: 0 | Status: SHIPPED | OUTPATIENT
Start: 2018-08-02 | End: 2018-08-09

## 2018-08-02 RX ORDER — HYDROCODONE BITARTRATE AND ACETAMINOPHEN 5; 325 MG/1; MG/1
1 TABLET ORAL EVERY 6 HOURS PRN
Qty: 8 TABLET | Refills: 0 | Status: SHIPPED | OUTPATIENT
Start: 2018-08-02 | End: 2019-01-31 | Stop reason: ALTCHOICE

## 2018-08-02 NOTE — ED NOTES
Discharge instructions reviewed by Irene Cardenas, [facility]  Patient ambulated out of department, steady gait, vss         Colin Morales RN  08/02/18 6760

## 2018-08-02 NOTE — DISCHARGE INSTRUCTIONS
Dental Abscess   WHAT YOU NEED TO KNOW:   A dental abscess is a collection of pus in or around a tooth  A dental abscess is caused by bacteria  The bacteria usually enter the tooth when the enamel (outer part of the tooth) is damaged by tooth decay  Bacteria may also enter the tooth through a break or chip in the tooth, or a cut in the gum  Food particles that are stuck between the teeth for a long time may also lead to an abscess  DISCHARGE INSTRUCTIONS:   Return to the emergency department if:   · You have severe pain  · You have trouble breathing because of pain or swelling  Contact your healthcare provider if:   · Your symptoms get worse, even after treatment  · Your mouth is bleeding  · You cannot eat or drink because of pain or swelling  · Your abscess returns  · You have an injury that causes a crack in your tooth  · You have questions or concerns about your condition or care  Medicines: You may  need any of the following:  · Antibiotics  help treat a bacterial infection  · NSAIDs , such as ibuprofen, help decrease swelling, pain, and fever  This medicine is available with or without a doctor's order  NSAIDs can cause stomach bleeding or kidney problems in certain people  If you take blood thinner medicine, always ask your healthcare provider if NSAIDs are safe for you  Always read the medicine label and follow directions  · Acetaminophen  decreases pain and fever  It is available without a doctor's order  Ask how much to take and how often to take it  Follow directions  Read the labels of all other medicines you are using to see if they also contain acetaminophen, or ask your doctor or pharmacist  Acetaminophen can cause liver damage if not taken correctly  Do not use more than 4 grams (4,000 milligrams) total of acetaminophen in one day  · Prescription pain medicine  may be given  Ask your healthcare provider how to take this medicine safely   Some prescription pain medicines contain acetaminophen  Do not take other medicines that contain acetaminophen without talking to your healthcare provider  Too much acetaminophen may cause liver damage  Prescription pain medicine may cause constipation  Ask your healthcare provider how to prevent or treat constipation  · Take your medicine as directed  Contact your healthcare provider if you think your medicine is not helping or if you have side effects  Tell him of her if you are allergic to any medicine  Keep a list of the medicines, vitamins, and herbs you take  Include the amounts, and when and why you take them  Bring the list or the pill bottles to follow-up visits  Carry your medicine list with you in case of an emergency  Self-care:   · Rinse your mouth every 2 hours with salt water  This will help keep the area clean  · Gently brush your teeth twice a day with a soft tooth brush  This will help keep the area clean  · Eat soft foods as directed  Soft foods may cause less pain  Examples include applesauce, yogurt, and cooked pasta  Ask your healthcare provider how long to follow this instruction  · Apply a warm compress to your tooth or gum  Use a cotton ball or gauze soaked in warm water  Remove the compress in 10 minutes or when it becomes cool  Repeat 3 times a day  Prevent another abscess:   · Brush your teeth at least 2 times a day with fluoride toothpaste  · Use dental floss to clean between your teeth at least once a day  · Rinse your mouth with water or mouthwash after meals and snacks  · Chew sugarless gum after meals and snacks  · Limit foods that are sticky and high in sugar such as raisons  Also limit drinks high in sugar, such as soda  · See your dentist every 6 months for dental cleanings and oral exams  Follow up with your healthcare provider in 24 hours: Your healthcare provider will need to check your teeth and gums   Write down your questions so you remember to ask them during your visits  © 2017 [employer] Information is for End User's use only and may not be sold, redistributed or otherwise used for commercial purposes  All illustrations and images included in CareNotes® are the copyrighted property of [employer]  or Kumar Gentile  The above information is an  only  It is not intended as medical advice for individual conditions or treatments  Talk to your doctor, nurse or pharmacist before following any medical regimen to see if it is safe and effective for you

## 2018-08-02 NOTE — ED PROVIDER NOTES
History  Chief Complaint   Patient presents with    Dental Pain     Patient c/o left upper dental pain  77-year-old male patient presents here with a chief complaint of dental pain he has left upper dental pain for the last few days he has decayed teeth  He denies any fever chills he is not systemic  States that not many people take his dental insurance  Prior to Admission Medications   Prescriptions Last Dose Informant Patient Reported? Taking?   calamine lotion   No No   Sig: Apply topically as needed for itching   hydrOXYzine HCL (ATARAX) 25 mg tablet   No No   Sig: Take 1 tablet by mouth every 6 (six) hours   triamcinolone (KENALOG) 0 1 % cream   No No   Sig: Apply 1 g topically 2 (two) times a day      Facility-Administered Medications: None       Past Medical History:   Diagnosis Date    Psychiatric disorder     anxiety       History reviewed  No pertinent surgical history  History reviewed  No pertinent family history  I have reviewed and agree with the history as documented  Social History   Substance Use Topics    Smoking status: Current Every Day Smoker     Packs/day: 0 50     Types: Cigarettes    Smokeless tobacco: Never Used    Alcohol use Yes        Review of Systems   Constitutional: Negative for diaphoresis, fatigue and fever  HENT: Positive for dental problem  Negative for congestion, ear pain, nosebleeds and sore throat  Eyes: Negative for photophobia, pain, discharge and visual disturbance  Respiratory: Negative for cough, choking, chest tightness, shortness of breath and wheezing  Cardiovascular: Negative for chest pain and palpitations  Gastrointestinal: Negative for abdominal distention, abdominal pain, diarrhea and vomiting  Genitourinary: Negative for dysuria, flank pain and frequency  Musculoskeletal: Negative for back pain, gait problem and joint swelling  Skin: Negative for color change and rash     Neurological: Negative for dizziness, syncope and headaches  Psychiatric/Behavioral: Negative for behavioral problems and confusion  The patient is not nervous/anxious  All other systems reviewed and are negative  Physical Exam  Physical Exam   Constitutional: He is oriented to person, place, and time  He appears well-developed and well-nourished  HENT:   Head: Normocephalic and atraumatic  Mouth/Throat:       Eyes: Pupils are equal, round, and reactive to light  Neck: Normal range of motion  Neck supple  Cardiovascular: Normal rate, regular rhythm, normal heart sounds and normal pulses  PMI is not displaced  Pulmonary/Chest: Effort normal and breath sounds normal  No respiratory distress  Abdominal: Soft  He exhibits no distension  There is no guarding  Musculoskeletal: Normal range of motion  Lymphadenopathy:     He has no cervical adenopathy  Neurological: He is alert and oriented to person, place, and time  Skin: Skin is warm and dry  No rash noted  He is not diaphoretic  No pallor  Psychiatric: He has a normal mood and affect  Vitals reviewed        Vital Signs  ED Triage Vitals   Temperature Pulse Respirations Blood Pressure SpO2   08/02/18 0830 08/02/18 0827 08/02/18 0827 08/02/18 0830 08/02/18 0827   97 5 °F (36 4 °C) (!) 54 18 119/76 96 %      Temp Source Heart Rate Source Patient Position - Orthostatic VS BP Location FiO2 (%)   08/02/18 0830 08/02/18 0827 08/02/18 0830 08/02/18 0830 --   Oral Monitor Sitting Right arm       Pain Score       --                  Vitals:    08/02/18 0827 08/02/18 0830   BP:  119/76   Pulse: (!) 54    Patient Position - Orthostatic VS:  Sitting       Visual Acuity      ED Medications  Medications - No data to display    Diagnostic Studies  Results Reviewed     None                 No orders to display              Procedures  Procedures       Phone Contacts  ED Phone Contact    ED Course                               MDM  Number of Diagnoses or Management Options  Dental abscess: new and requires workup  Diagnosis management comments: Patient to begin antibiotic therapy  Discussion about allergy to amoxicillin  Reports small bumps  Reports son had the same  Believes it was likely a virus  Advised to discontinue penicillin if he develops any signs of allergic reaction  Patient Progress  Patient progress: stable    CritCare Time    Disposition  Final diagnoses:   Dental abscess     Time reflects when diagnosis was documented in both MDM as applicable and the Disposition within this note     Time User Action Codes Description Comment    8/2/2018  8:32 AM Rony Moser Add [K04 7] Dental abscess       ED Disposition     ED Disposition Condition Comment    Discharge  Dayron Tere discharge to home/self care  Condition at discharge: Good        Follow-up Information     Follow up With Specialties Details Why Jadeontreva  Schedule an appointment as soon as possible for a visit For Continued Evaluation [address]  [facility] [address] [phone]          Patient's Medications   Discharge Prescriptions    HYDROCODONE-ACETAMINOPHEN (NORCO) 5-325 MG PER TABLET    Take 1 tablet by mouth every 6 (six) hours as needed (Not relieved by Anti-inflammatory) for up to 8 doses Max Daily Amount: 4 tablets       Start Date: 8/2/2018  End Date: --       Order Dose: 1 tablet       Quantity: 8 tablet    Refills: 0    NAPROXEN (NAPROSYN) 500 MG TABLET    Take 1 tablet (500 mg total) by mouth 2 (two) times a day with meals for 5 days       Start Date: 8/2/2018  End Date: 8/7/2018       Order Dose: 500 mg       Quantity: 10 tablet    Refills: 0    PENICILLIN V POTASSIUM (VEETID) 500 MG TABLET    Take 1 tablet (500 mg total) by mouth 4 (four) times a day for 10 days       Start Date: 8/2/2018  End Date: 8/12/2018       Order Dose: 500 mg       Quantity: 40 tablet    Refills: 0     No discharge procedures on file      ED Provider  Electronically Signed by           SOWMYA Louise  08/02/18 0348

## 2018-10-31 ENCOUNTER — HOSPITAL ENCOUNTER (EMERGENCY)
Facility: HOSPITAL | Age: 23
Discharge: HOME | End: 2018-10-31
Attending: EMERGENCY MEDICINE | Admitting: EMERGENCY MEDICINE
Payer: MEDICARE

## 2018-10-31 VITALS
BODY MASS INDEX: 19.93 KG/M2 | DIASTOLIC BLOOD PRESSURE: 65 MMHG | WEIGHT: 124 LBS | SYSTOLIC BLOOD PRESSURE: 117 MMHG | HEIGHT: 66 IN | OXYGEN SATURATION: 97 % | RESPIRATION RATE: 20 BRPM | HEART RATE: 96 BPM | TEMPERATURE: 98.3 F

## 2018-10-31 DIAGNOSIS — H92.01 RIGHT EAR PAIN: ICD-10-CM

## 2018-10-31 DIAGNOSIS — J06.9 URI (UPPER RESPIRATORY INFECTION): ICD-10-CM

## 2018-10-31 DIAGNOSIS — R05.9 COUGH: Primary | ICD-10-CM

## 2018-10-31 PROCEDURE — 99283 EMERGENCY DEPT VISIT LOW MDM: CPT

## 2018-10-31 NOTE — DISCHARGE INSTRUCTIONS
Earache   WHAT YOU NEED TO KNOW:   An earache can be caused by a problem within your ear or from another body area  Common causes include earwax buildup, objects in your ear, injury, infections, or jaw or dental problems  Less often, earaches may be caused by arthritis in your upper spine  DISCHARGE INSTRUCTIONS:   Return to the emergency department if:   · You have a severe earache  · You have ear pain with itching, hearing loss, dizziness, a feeling of fullness in your ear, or ringing in your ears  Contact your healthcare provider if:   · Your ear pain worsens or does not go away with treatment  · You have drainage from your ear  · You have a fever  · Your outer ear becomes red, swollen, and warm  · You have questions or concerns about your condition or care  Medicines: You may need any of the following:  · NSAIDs , such as ibuprofen, help decrease swelling, pain, and fever  This medicine is available with or without a doctor's order  NSAIDs can cause stomach bleeding or kidney problems in certain people  If you take blood thinner medicine, always ask if NSAIDs are safe for you  Always read the medicine label and follow directions  Do not give these medicines to children under 10months of age without direction from your child's healthcare provider  · Acetaminophen  decreases pain and fever  It is available without a doctor's order  Ask how much to take and how often to take it  Follow directions  Acetaminophen can cause liver damage if not taken correctly  · Do not give aspirin to children under 25years of age  Your child could develop Reye syndrome if he takes aspirin  Reye syndrome can cause life-threatening brain and liver damage  Check your child's medicine labels for aspirin, salicylates, or oil of wintergreen  · Take your medicine as directed  Call your healthcare provider if you think your medicine is not helping or if you have side effects   Tell him if you are allergic to any medicine  Keep a list of the medicines, vitamins, and herbs you take  Include the amounts, and when and why you take them  Bring the list or the pill bottles to follow-up visits  Carry your medicine list with you in case of an emergency  Follow up with your healthcare provider as directed:  Write down your questions so you remember to ask them during your visits  © 2017 [employer] Information is for End User's use only and may not be sold, redistributed or otherwise used for commercial purposes  All illustrations and images included in CareNotes® are the copyrighted property of [employer]  or Kumar Gentile  The above information is an  only  It is not intended as medical advice for individual conditions or treatments  Talk to your doctor, nurse or pharmacist before following any medical regimen to see if it is safe and effective for you

## 2018-10-31 NOTE — ED PROVIDER NOTES
History  Chief Complaint   Patient presents with    Flu Symptoms     Pt reports to ED w c/o chills, muscle aches, cough, congestion, stuffy nose  Healthy appearing adult in no apparent distress  Cough runny nose R ear pain and myalgias x several days, constant, no aggravating or alleviating factors  No fever or syncope or CP or dyspnea  No rash  No recent travel  Not on abx  No significant PMH  Currently symptomatic  History provided by:  Patient and spouse      Prior to Admission Medications   Prescriptions Last Dose Informant Patient Reported? Taking? HYDROcodone-acetaminophen (NORCO) 5-325 mg per tablet   No No   Sig: Take 1 tablet by mouth every 6 (six) hours as needed (Not relieved by Anti-inflammatory) for up to 8 doses Max Daily Amount: 4 tablets   calamine lotion   No No   Sig: Apply topically as needed for itching   hydrOXYzine HCL (ATARAX) 25 mg tablet   No No   Sig: Take 1 tablet by mouth every 6 (six) hours   naproxen (NAPROSYN) 500 mg tablet   No No   Sig: Take 1 tablet (500 mg total) by mouth 2 (two) times a day with meals for 5 days   triamcinolone (KENALOG) 0 1 % cream   No No   Sig: Apply 1 g topically 2 (two) times a day      Facility-Administered Medications: None       Past Medical History:   Diagnosis Date    Psychiatric disorder     anxiety       History reviewed  No pertinent surgical history  History reviewed  No pertinent family history  I have reviewed and agree with the history as documented  Social History   Substance Use Topics    Smoking status: Former Smoker     Packs/day: 0 50     Types: Cigarettes    Smokeless tobacco: Never Used    Alcohol use No        Review of Systems   Constitutional: Positive for chills, diaphoresis and fatigue  Negative for fever  HENT: Positive for congestion and ear pain  Negative for facial swelling and hearing loss  Eyes: Negative  Respiratory: Positive for cough  Negative for shortness of breath, wheezing and stridor  Endocrine: Negative  Genitourinary: Negative  Musculoskeletal: Positive for myalgias  Negative for neck pain and neck stiffness  Allergic/Immunologic: Negative  Neurological: Negative  Hematological: Negative  Physical Exam  Physical Exam   Constitutional: He is oriented to person, place, and time  He appears well-developed and well-nourished  No distress  HENT:   Head: Normocephalic and atraumatic  Right Ear: External ear normal    Left Ear: External ear normal    Nose: Nose normal    Mouth/Throat: Oropharynx is clear and moist  No oropharyngeal exudate  Bulging R TM   Eyes: Pupils are equal, round, and reactive to light  Conjunctivae and EOM are normal    Neck: Normal range of motion  Neck supple  No JVD present  Cardiovascular: Normal rate, regular rhythm, normal heart sounds and intact distal pulses  Exam reveals no gallop and no friction rub  No murmur heard  Pulmonary/Chest: Effort normal and breath sounds normal  No stridor  No respiratory distress  He has no wheezes  He has no rales  Abdominal: Soft  He exhibits no distension  There is no tenderness  Musculoskeletal: Normal range of motion  He exhibits no edema, tenderness or deformity  Neurological: He is alert and oriented to person, place, and time  No cranial nerve deficit or sensory deficit  He exhibits normal muscle tone  Coordination normal    Skin: Skin is warm and dry  Capillary refill takes less than 2 seconds  He is not diaphoretic  Nursing note and vitals reviewed        Vital Signs  ED Triage Vitals [10/31/18 0216]   Temperature Pulse Respirations Blood Pressure SpO2   98 3 °F (36 8 °C) (!) 106 20 117/65 98 %      Temp Source Heart Rate Source Patient Position - Orthostatic VS BP Location FiO2 (%)   Oral Monitor Lying Right arm --      Pain Score       --           Vitals:    10/31/18 0216 10/31/18 0217   BP: 117/65 117/65   Pulse: (!) 106 96   Patient Position - Orthostatic VS: Lying Lying       Visual Acuity      ED Medications  Medications - No data to display    Diagnostic Studies  Results Reviewed     None                 No orders to display              Procedures  Procedures       Phone Contacts  ED Phone Contact    ED Course                               MDM  Number of Diagnoses or Management Options  Cough:   Right ear pain:   URI (upper respiratory infection):   Diagnosis management comments: Well appearing well hydrated generally healthy pt with normal vitals and nonspecific sxs suggestive of viral process  Plan - auralgan for otalgia, d/c home, rted if new or worsening or any other concerns  CritCare Time    Disposition  Final diagnoses:   Cough   URI (upper respiratory infection)   Right ear pain     Time reflects when diagnosis was documented in both MDM as applicable and the Disposition within this note     Time User Action Codes Description Comment    10/31/2018  2:29 AM Daniela Errol Add [R05] Cough     10/31/2018  2:29 AM Andres Smith Add [J06 9] URI (upper respiratory infection)     10/31/2018  2:29 AM Daniela Cooter Add [H92 01] Right ear pain       ED Disposition     ED Disposition Condition Comment    Discharge  Feroz Mora discharge to home/self care      Condition at discharge: Stable        Follow-up Information    None         Discharge Medication List as of 10/31/2018  2:29 AM      START taking these medications    Details   antipyrine-benzocaine (AURODEX) otic solution Administer 4 drops to the right ear every 2 (two) hours as needed for ear pain, Starting Wed 10/31/2018, Print         CONTINUE these medications which have NOT CHANGED    Details   calamine lotion Apply topically as needed for itching, Starting Fri 5/4/2018, Print      HYDROcodone-acetaminophen (NORCO) 5-325 mg per tablet Take 1 tablet by mouth every 6 (six) hours as needed (Not relieved by Anti-inflammatory) for up to 8 doses Max Daily Amount: 4 tablets, Starting Thu 8/2/2018, Print      hydrOXYzine HCL (ATARAX) 25 mg tablet Take 1 tablet by mouth every 6 (six) hours, Starting Tue 6/20/2017, Print      naproxen (NAPROSYN) 500 mg tablet Take 1 tablet (500 mg total) by mouth 2 (two) times a day with meals for 5 days, Starting Thu 8/2/2018, Until Tue 8/7/2018, Print      triamcinolone (KENALOG) 0 1 % cream Apply 1 g topically 2 (two) times a day, Starting Tue 6/20/2017, Print           No discharge procedures on file      ED Provider  Electronically Signed by           Israel Farias MD  10/31/18 5670

## 2018-11-22 ENCOUNTER — HOSPITAL ENCOUNTER (EMERGENCY)
Facility: HOSPITAL | Age: 23
Discharge: HOME | End: 2018-11-22
Attending: EMERGENCY MEDICINE
Payer: MEDICARE

## 2018-11-22 VITALS
RESPIRATION RATE: 16 BRPM | SYSTOLIC BLOOD PRESSURE: 115 MMHG | BODY MASS INDEX: 19.37 KG/M2 | HEART RATE: 70 BPM | DIASTOLIC BLOOD PRESSURE: 59 MMHG | TEMPERATURE: 97.8 F | WEIGHT: 120 LBS | OXYGEN SATURATION: 100 %

## 2018-11-22 DIAGNOSIS — K04.7 PERIAPICAL ABSCESS: Primary | ICD-10-CM

## 2018-11-22 PROCEDURE — 99282 EMERGENCY DEPT VISIT SF MDM: CPT

## 2018-11-22 RX ORDER — CLINDAMYCIN HYDROCHLORIDE 150 MG/1
300 CAPSULE ORAL ONCE
Status: COMPLETED | OUTPATIENT
Start: 2018-11-22 | End: 2018-11-22

## 2018-11-22 RX ORDER — CLINDAMYCIN HYDROCHLORIDE 150 MG/1
300 CAPSULE ORAL EVERY 6 HOURS
Qty: 56 CAPSULE | Refills: 0 | Status: SHIPPED | OUTPATIENT
Start: 2018-11-22 | End: 2018-11-29

## 2018-11-22 RX ADMIN — CLINDAMYCIN HYDROCHLORIDE 300 MG: 150 CAPSULE ORAL at 12:13

## 2018-11-22 NOTE — ED PROVIDER NOTES
Pt Name: Theo Roman  MRN: [id number]  [place] 1995  Age/Sex: 21 y o  male  Date of evaluation: 11/22/2018  PCP: Vito Garza    Chief Complaint   Patient presents with    Oral Swelling     pt with left upper jaw swelling from a broken tooth         HPI    21 y o  male presenting with left upper jaw swelling x1 day  Patient has known bad tooth that previously had an abscess that erupted through the tooth and drained that way, has been following with dentist although has been unable to afford extraction up until this point  Patient notes he had some sharp pain while eating something yesterday and when he woke this morning he has had some swelling in the left upper jaw  He denies fevers, nausea, vomiting, diarrhea, chest pain, shortness of breath, other symptoms  HPI      Past Medical and Surgical History    Past Medical History:   Diagnosis Date    Psychiatric disorder     anxiety       History reviewed  No pertinent surgical history  History reviewed  No pertinent family history  Social History   Substance Use Topics    Smoking status: Former Smoker     Packs/day: 0 20     Types: Cigarettes    Smokeless tobacco: Never Used    Alcohol use No           Allergies    Allergies   Allergen Reactions    Amoxicillin        Home Medications    Prior to Admission medications    Medication Sig Start Date End Date Taking?  Authorizing Provider   antipyrine-benzocaine (AURODEX) otic solution Administer 4 drops to the right ear every 2 (two) hours as needed for ear pain 10/31/18   Aimee Daley MD   calamine lotion Apply topically as needed for itching 5/4/18   Pio Gonzalez PA-C   HYDROcodone-acetaminophen [facility]) 5-325 mg per tablet Take 1 tablet by mouth every 6 (six) hours as needed (Not relieved by Anti-inflammatory) for up to 8 doses Max Daily Amount: 4 tablets 8/2/18   SOWMYA Fitzpatrick   hydrOXYzine HCL (ATARAX) 25 mg tablet Take 1 tablet by mouth every 6 (six) hours 6/20/17   Carline Kim,    naproxen (NAPROSYN) 500 mg tablet Take 1 tablet (500 mg total) by mouth 2 (two) times a day with meals for 5 days 8/2/18 8/7/18  [facility]   triamcinolone (KENALOG) 0 1 % cream Apply 1 g topically 2 (two) times a day 6/20/17   Carline Kim DO           Review of Systems    Review of Systems   Constitutional: Negative for appetite change, chills and diaphoresis  HENT: Positive for dental problem and facial swelling  Negative for drooling, trouble swallowing and voice change  Respiratory: Negative for apnea, shortness of breath and wheezing  Cardiovascular: Negative for chest pain and leg swelling  Gastrointestinal: Negative for abdominal distention, abdominal pain, diarrhea, nausea and vomiting  Genitourinary: Negative for dysuria and urgency  Musculoskeletal: Negative for arthralgias, back pain, gait problem and neck pain  Skin: Negative for color change, rash and wound  Neurological: Negative for seizures, speech difficulty, weakness and headaches  Psychiatric/Behavioral: Negative for agitation, behavioral problems and dysphoric mood  The patient is not nervous/anxious  All other systems reviewed and negative  Physical Exam      ED Triage Vitals [11/22/18 1147]   Temperature Pulse Respirations Blood Pressure SpO2   97 8 °F (36 6 °C) 70 16 115/59 100 %      Temp Source Heart Rate Source Patient Position - Orthostatic VS BP Location FiO2 (%)   Oral Monitor Lying Right arm --      Pain Score       2               Physical Exam   Constitutional: He is oriented to person, place, and time  He appears well-developed and well-nourished  HENT:   Head: Normocephalic and atraumatic  Mouth/Throat:       Eyes: Pupils are equal, round, and reactive to light  Conjunctivae and EOM are normal    Extraocular movements intact and painless   Neck: Normal range of motion  Neck supple  No tracheal deviation present     Cardiovascular: Normal rate, regular rhythm, normal heart sounds and intact distal pulses  No murmur heard  Pulmonary/Chest: Effort normal and breath sounds normal  No stridor  No respiratory distress  He has no wheezes  He has no rales  Abdominal: Soft  He exhibits no distension  There is no tenderness  There is no rebound and no guarding  Musculoskeletal: Normal range of motion  He exhibits no edema or deformity  Neurological: He is alert and oriented to person, place, and time  Skin: Skin is warm and dry  No rash noted  Psychiatric: He has a normal mood and affect  His behavior is normal  Judgment and thought content normal             Diagnostic Results      Labs:    Results for orders placed or performed during the hospital encounter of 02/17/18   Herpes simplex virus culture   Result Value Ref Range    Herpes Simplex Virus Culture Negative-No Herpes Simplex Virus isolated  Negative-No Herpes Simplex Virus isolated , Toxic, Contaminated   RPR   Result Value Ref Range    RPR Non-Reactive Non-Reactive       All labs reviewed and utilized in the medical decision making process    Radiology:    No orders to display       All radiology studies independently viewed by me and interpreted by the radiologist     Procedure    Procedures    CritCare Time      ED Course of Care and Re-Assessments      Patient offered CT scan with contrast of the face with concern for possible extension the abscess after thorough discussion of risks and benefits to include relatively low pretest probability of findings that will  at this time as well as time, cost, repeat radiation exposure as patient just had another CT approximately a week ago for trauma, patient declined CT scan at this time  In particular, he site concerns about repeat radiation exposure and states the last time this happened he got better with antibiotics alone      Medications   clindamycin (CLEOCIN) capsule 300 mg (300 mg Oral Given 11/22/18 1213) FINAL IMPRESSION    Final diagnoses:   Periapical abscess         DISPOSITION/PLAN    History and symptoms above  Vital signs reassuring, examination remarkable for findings as above  Swelling of face more consistent with reactive changes rather than abscess extending outside of periapical region  relatively low suspicion for significant extension of abscess  Do not suspect sepsis, Steve's angina, PTA, RPA, other acute life threat  Patient declined CT scan as above  Discharged strict return precautions, started on clindamycin with 1st dose in ER, follow up as soon as possible with dentist   Patient also provided contact information for network dentists  Hemodynamically stable and comfortable time of discharge  Time reflects when diagnosis was documented in both MDM as applicable and the Disposition within this note     Time User Action Codes Description Comment    11/22/2018 12:04 PM Dioni Figueredo Add [K04 7] Periapical abscess       ED Disposition     ED Disposition Condition Comment    Discharge  Simon Lopez discharge to home/self care      Condition at discharge: Good        Follow-up Information     Follow up With Specialties Details Why [address] Internal [facility] Call in 1 day As needed [address]  Call in 1 day To discuss your symptoms and schedule followup as soon as possible [address]            PATIENT REFERRED TO:    Alyson Mcke[address]  [place] [address]  [phone]    Call in 1 day  As needed    [address]  [phone]  Call in 1 day  To discuss your symptoms and schedule followup as soon as possible      DISCHARGE MEDICATIONS:    Discharge Medication List as of 11/22/2018 12:09 PM      START taking these medications    Details   clindamycin (CLEOCIN) 150 mg capsule Take 2 capsules (300 mg total) by mouth every 6 (six) hours for 7 days, Starting Thu 11/22/2018, Until Thu 11/29/2018, Print         CONTINUE these medications which have NOT CHANGED    Details   antipyrine-benzocaine (AURODEX) otic solution Administer 4 drops to the right ear every 2 (two) hours as needed for ear pain, Starting Wed 10/31/2018, Print      calamine lotion Apply topically as needed for itching, Starting Fri 5/4/2018, Print      HYDROcodone-acetaminophen (NORCO) 5-325 mg per tablet Take 1 tablet by mouth every 6 (six) hours as needed (Not relieved by Anti-inflammatory) for up to 8 doses Max Daily Amount: 4 tablets, Starting Thu 8/2/2018, Print      hydrOXYzine HCL (ATARAX) 25 mg tablet Take 1 tablet by mouth every 6 (six) hours, Starting Tue 6/20/2017, Print      naproxen (NAPROSYN) 500 mg tablet Take 1 tablet (500 mg total) by mouth 2 (two) times a day with meals for 5 days, Starting Thu 8/2/2018, Until Tue 8/7/2018, Print      triamcinolone (KENALOG) 0 1 % cream Apply 1 g topically 2 (two) times a day, Starting Tue 6/20/2017, Print             No discharge procedures on file           MD Hemanth Umana MD  11/22/18 0280

## 2018-11-23 PROCEDURE — 99282 EMERGENCY DEPT VISIT SF MDM: CPT

## 2018-11-24 ENCOUNTER — HOSPITAL ENCOUNTER (EMERGENCY)
Facility: HOSPITAL | Age: 23
Discharge: HOME | End: 2018-11-24
Attending: EMERGENCY MEDICINE | Admitting: EMERGENCY MEDICINE
Payer: MEDICARE

## 2018-11-24 VITALS
OXYGEN SATURATION: 98 % | RESPIRATION RATE: 18 BRPM | HEART RATE: 85 BPM | TEMPERATURE: 98.9 F | BODY MASS INDEX: 19.93 KG/M2 | SYSTOLIC BLOOD PRESSURE: 133 MMHG | WEIGHT: 123.46 LBS | DIASTOLIC BLOOD PRESSURE: 75 MMHG

## 2018-11-24 DIAGNOSIS — K04.7 DENTAL ABSCESS: Primary | ICD-10-CM

## 2018-11-24 RX ORDER — LIDOCAINE HYDROCHLORIDE AND EPINEPHRINE 20; 5 MG/ML; UG/ML
10 INJECTION, SOLUTION EPIDURAL; INFILTRATION; INTRACAUDAL; PERINEURAL ONCE
Status: COMPLETED | OUTPATIENT
Start: 2018-11-24 | End: 2018-11-24

## 2018-11-24 RX ADMIN — LIDOCAINE HYDROCHLORIDE,EPINEPHRINE BITARTRATE 10 ML: 20; .005 INJECTION, SOLUTION EPIDURAL; INFILTRATION; INTRACAUDAL; PERINEURAL at 00:38

## 2018-11-24 NOTE — ED PROVIDER NOTES
History  Chief Complaint   Patient presents with    Dental Pain     Patient complaining of left upper jaw pain  Patient seen on Tuesday for same and placed on antibiotics  HPI patient is a 71-year-old male, he reports that he was seen on Tuesday and placed on antibiotics  Patient reports 3 days of antibiotics and he still has tenderness and swelling in his left facial area  Points to an area above his left canine in the maxillary sinus area  He reports there is fullness and tenderness there  Patient believes he has a localized abscess  Patient reports that he has not been able to see a dentist   He reports that he will try and see a dentist but right now he is asking for help for this dental infection  He denies any fever  There is no visual disturbance  He denies any difficulty with his eyes  He denies any difficulty breathing  Past medical history psychiatric disorder anxiety  Family history noncontributory  Social history, former smoker    Prior to Admission Medications   Prescriptions Last Dose Informant Patient Reported? Taking?    HYDROcodone-acetaminophen (NORCO) 5-325 mg per tablet   No No   Sig: Take 1 tablet by mouth every 6 (six) hours as needed (Not relieved by Anti-inflammatory) for up to 8 doses Max Daily Amount: 4 tablets   antipyrine-benzocaine (AURODEX) otic solution   No No   Sig: Administer 4 drops to the right ear every 2 (two) hours as needed for ear pain   calamine lotion   No No   Sig: Apply topically as needed for itching   clindamycin (CLEOCIN) 150 mg capsule   No No   Sig: Take 2 capsules (300 mg total) by mouth every 6 (six) hours for 7 days   hydrOXYzine HCL (ATARAX) 25 mg tablet   No No   Sig: Take 1 tablet by mouth every 6 (six) hours   naproxen (NAPROSYN) 500 mg tablet   No No   Sig: Take 1 tablet (500 mg total) by mouth 2 (two) times a day with meals for 5 days   triamcinolone (KENALOG) 0 1 % cream   No No   Sig: Apply 1 g topically 2 (two) times a day Facility-Administered Medications: None       Past Medical History:   Diagnosis Date    Psychiatric disorder     anxiety       History reviewed  No pertinent surgical history  History reviewed  No pertinent family history  I have reviewed and agree with the history as documented  Social History   Substance Use Topics    Smoking status: Former Smoker     Packs/day: 0 20     Types: Cigarettes    Smokeless tobacco: Never Used    Alcohol use No        Review of Systems   Constitutional: Negative for fever  HENT: Positive for dental problem and facial swelling  Negative for congestion  Eyes: Negative for pain and redness  Respiratory: Negative for cough and shortness of breath  Cardiovascular: Negative for chest pain  Gastrointestinal: Negative for abdominal pain and vomiting  Physical Exam  Physical Exam   Constitutional: He is oriented to person, place, and time  He appears well-developed and well-nourished  HENT:   Head: Normocephalic  Right Ear: External ear normal    Left Ear: External ear normal    Nose: Nose normal    Mouth/Throat: Oropharynx is clear and moist    There is no swelling under the tongue there is no stridor there is no drooling, there are multiple cavities especially the left canine has a large open cavity, there is tenderness above the left canine and fullness in the left maxilla area  Consistent with an    Eyes: Pupils are equal, round, and reactive to light  EOM and lids are normal    Neck: Normal range of motion  Neck supple  Pulmonary/Chest: Effort normal  No respiratory distress  Musculoskeletal: Normal range of motion  He exhibits no deformity  Neurological: He is alert and oriented to person, place, and time  Skin: Skin is warm and dry  Psychiatric: He has a normal mood and affect  Nursing note and vitals reviewed        Vital Signs  ED Triage Vitals [11/24/18 0034]   Temperature Pulse Respirations Blood Pressure SpO2   98 9 °F (37 2 °C) 85 18 133/75 98 %      Temp Source Heart Rate Source Patient Position - Orthostatic VS BP Location FiO2 (%)   Oral Monitor Sitting Right arm --      Pain Score       Worst Possible Pain           Vitals:    11/24/18 0034   BP: 133/75   Pulse: 85   Patient Position - Orthostatic VS: Sitting       Visual Acuity      ED Medications  Medications   lidocaine-epinephrine (XYLOCAINE-MPF/EPINEPHRINE) 2 %-1:200,000 injection 10 mL (10 mL Infiltration Given 11/24/18 0038)       Diagnostic Studies  Results Reviewed     None                 No orders to display              Procedures  Incision/Drainage  Date/Time: 11/24/2018 12:39 AM  Performed by: Armond Pacheco by: Lisa Kelsey     Patient location:  ED  Consent:     Consent obtained:  Verbal    Consent given by:  Patient    Risks discussed:  Bleeding and incomplete drainage  Location:     Type:  Abscess    Location:  Mouth    Mouth location:  Vestibule of mouth  Anesthesia (see MAR for exact dosages): Anesthesia method:  Local infiltration    Local anesthetic:  Lidocaine 1% WITH epi  Procedure details:     Complexity:  Simple    Incision types:  Single straight    Scalpel blade:  11    Approach:  Open    Incision depth:  Submucosal    Drainage:  Purulent    Wound treatment:  Wound left open  Post-procedure details:     Patient tolerance of procedure: Tolerated well, no immediate complications  Comments: The area was anesthetized with 1% lidocaine, area was  Punctured with a  Needle, pus was produced, area was further opened with an 11 blade  Phone Contacts  ED Phone Contact    ED Course                               MDM  Medical decision making 77-year-old male presents with swelling in his left face and around his left canine, consistent with a dental abscess  Anesthetized and drain with a 11  Blade  Good drainage, discussed  Continue antibiotics  Discussed follow up with maxillofacial surgery    CritCare Time    Disposition  Final diagnoses: Dental abscess - Left lateral canine, incision and drainage     Time reflects when diagnosis was documented in both MDM as applicable and the Disposition within this note     Time User Action Codes Description Comment    11/24/2018 12:50 AM Lenny Frausto Add [K04 7] Dental abscess     11/24/2018 12:50 AM Lenny Frausto Modify [K04 7] Dental abscess Left lateral canine, incision and drainage      ED Disposition     ED Disposition Condition Comment    Discharge  Rice Lofty discharge to home/self care      Condition at discharge: Good        Follow-up Information     Follow up With Specialties Details Why [address] [facility]  [facility]          Discharge Medication List as of 11/24/2018 12:51 AM      CONTINUE these medications which have NOT CHANGED    Details   antipyrine-benzocaine (AURODEX) otic solution Administer 4 drops to the right ear every 2 (two) hours as needed for ear pain, Starting Wed 10/31/2018, Print      calamine lotion Apply topically as needed for itching, Starting Fri 5/4/2018, Print      clindamycin (CLEOCIN) 150 mg capsule Take 2 capsules (300 mg total) by mouth every 6 (six) hours for 7 days, Starting Thu 11/22/2018, Until Thu 11/29/2018, Print      HYDROcodone-acetaminophen (NORCO) 5-325 mg per tablet Take 1 tablet by mouth every 6 (six) hours as needed (Not relieved by Anti-inflammatory) for up to 8 doses Max Daily Amount: 4 tablets, Starting Thu 8/2/2018, Print      hydrOXYzine HCL (ATARAX) 25 mg tablet Take 1 tablet by mouth every 6 (six) hours, Starting Tue 6/20/2017, Print      naproxen (NAPROSYN) 500 mg tablet Take 1 tablet (500 mg total) by mouth 2 (two) times a day with meals for 5 days, Starting Thu 8/2/2018, Until Tue 8/7/2018, Print      triamcinolone (KENALOG) 0 1 % cream Apply 1 g topically 2 (two) times a day, Starting Tue 6/20/2017, Print           No discharge procedures on file      ED Provider  Electronically Signed by           Toño Mahan MD  11/24/18 1312

## 2018-11-24 NOTE — DISCHARGE INSTRUCTIONS
Continue clindamycin  Rinse your mouth out frequently  Follow up with oral maxillofacial surgery     Dental Abscess   WHAT YOU NEED TO KNOW:   A dental abscess is a collection of pus in or around a tooth  A dental abscess is caused by bacteria  The bacteria usually enter the tooth when the enamel (outer part of the tooth) is damaged by tooth decay  Bacteria may also enter the tooth through a break or chip in the tooth, or a cut in the gum  Food particles that are stuck between the teeth for a long time may also lead to an abscess  DISCHARGE INSTRUCTIONS:   Return to the emergency department if:   · You have severe pain  · You have trouble breathing because of pain or swelling  Contact your healthcare provider if:   · Your symptoms get worse, even after treatment  · Your mouth is bleeding  · You cannot eat or drink because of pain or swelling  · Your abscess returns  · You have an injury that causes a crack in your tooth  · You have questions or concerns about your condition or care  Medicines: You may  need any of the following:  · Antibiotics  help treat a bacterial infection  · NSAIDs , such as ibuprofen, help decrease swelling, pain, and fever  This medicine is available with or without a doctor's order  NSAIDs can cause stomach bleeding or kidney problems in certain people  If you take blood thinner medicine, always ask your healthcare provider if NSAIDs are safe for you  Always read the medicine label and follow directions  · Acetaminophen  decreases pain and fever  It is available without a doctor's order  Ask how much to take and how often to take it  Follow directions  Read the labels of all other medicines you are using to see if they also contain acetaminophen, or ask your doctor or pharmacist  Acetaminophen can cause liver damage if not taken correctly  Do not use more than 4 grams (4,000 milligrams) total of acetaminophen in one day       · Prescription pain medicine may be given  Ask your healthcare provider how to take this medicine safely  Some prescription pain medicines contain acetaminophen  Do not take other medicines that contain acetaminophen without talking to your healthcare provider  Too much acetaminophen may cause liver damage  Prescription pain medicine may cause constipation  Ask your healthcare provider how to prevent or treat constipation  · Take your medicine as directed  Contact your healthcare provider if you think your medicine is not helping or if you have side effects  Tell him of her if you are allergic to any medicine  Keep a list of the medicines, vitamins, and herbs you take  Include the amounts, and when and why you take them  Bring the list or the pill bottles to follow-up visits  Carry your medicine list with you in case of an emergency  Self-care:   · Rinse your mouth every 2 hours with salt water  This will help keep the area clean  · Gently brush your teeth twice a day with a soft tooth brush  This will help keep the area clean  · Eat soft foods as directed  Soft foods may cause less pain  Examples include applesauce, yogurt, and cooked pasta  Ask your healthcare provider how long to follow this instruction  · Apply a warm compress to your tooth or gum  Use a cotton ball or gauze soaked in warm water  Remove the compress in 10 minutes or when it becomes cool  Repeat 3 times a day  Prevent another abscess:   · Brush your teeth at least 2 times a day with fluoride toothpaste  · Use dental floss to clean between your teeth at least once a day  · Rinse your mouth with water or mouthwash after meals and snacks  · Chew sugarless gum after meals and snacks  · Limit foods that are sticky and high in sugar such as raisons  Also limit drinks high in sugar, such as soda  · See your dentist every 6 months for dental cleanings and oral exams  Follow up with your healthcare provider in 24 hours:   Your healthcare provider will need to check your teeth and gums  Write down your questions so you remember to ask them during your visits  © 2017 [employer] Information is for End User's use only and may not be sold, redistributed or otherwise used for commercial purposes  All illustrations and images included in CareNotes® are the copyrighted property of [employer]  or [employer] Gentile  The above information is an  only  It is not intended as medical advice for individual conditions or treatments  Talk to your doctor, nurse or pharmacist before following any medical regimen to see if it is safe and effective for you

## 2019-01-31 ENCOUNTER — APPOINTMENT (EMERGENCY)
Dept: RADIOLOGY | Facility: HOSPITAL | Age: 24
End: 2019-01-31
Payer: MEDICARE

## 2019-01-31 ENCOUNTER — HOSPITAL ENCOUNTER (EMERGENCY)
Facility: HOSPITAL | Age: 24
Discharge: HOME | End: 2019-01-31
Attending: EMERGENCY MEDICINE
Payer: MEDICARE

## 2019-01-31 VITALS
RESPIRATION RATE: 18 BRPM | HEART RATE: 88 BPM | WEIGHT: 120 LBS | DIASTOLIC BLOOD PRESSURE: 64 MMHG | SYSTOLIC BLOOD PRESSURE: 131 MMHG | TEMPERATURE: 98.8 F | BODY MASS INDEX: 19.29 KG/M2 | OXYGEN SATURATION: 99 % | HEIGHT: 66 IN

## 2019-01-31 DIAGNOSIS — R07.89 MUSCULOSKELETAL CHEST PAIN: Primary | ICD-10-CM

## 2019-01-31 PROCEDURE — 71046 X-RAY EXAM CHEST 2 VIEWS: CPT

## 2019-01-31 PROCEDURE — 93005 ELECTROCARDIOGRAM TRACING: CPT

## 2019-01-31 PROCEDURE — 99285 EMERGENCY DEPT VISIT HI MDM: CPT

## 2019-01-31 RX ORDER — LIDOCAINE 50 MG/G
1 PATCH TOPICAL DAILY PRN
Qty: 6 PATCH | Refills: 0 | Status: SHIPPED | OUTPATIENT
Start: 2019-01-31 | End: 2019-11-23 | Stop reason: ALTCHOICE

## 2019-01-31 RX ORDER — NAPROXEN 500 MG/1
500 TABLET ORAL EVERY 12 HOURS PRN
Qty: 20 TABLET | Refills: 0 | Status: SHIPPED | OUTPATIENT
Start: 2019-01-31 | End: 2019-11-23 | Stop reason: ALTCHOICE

## 2019-01-31 RX ORDER — LIDOCAINE 50 MG/G
1 PATCH TOPICAL DAILY PRN
Qty: 6 PATCH | Refills: 0 | Status: SHIPPED | OUTPATIENT
Start: 2019-01-31 | End: 2019-01-31

## 2019-01-31 RX ORDER — LIDOCAINE 50 MG/G
1 PATCH TOPICAL ONCE
Status: DISCONTINUED | OUTPATIENT
Start: 2019-01-31 | End: 2019-01-31 | Stop reason: HOSPADM

## 2019-01-31 RX ORDER — IBUPROFEN 600 MG/1
600 TABLET ORAL EVERY 6 HOURS PRN
COMMUNITY
End: 2021-07-30

## 2019-01-31 RX ORDER — NAPROXEN 250 MG/1
500 TABLET ORAL ONCE
Status: COMPLETED | OUTPATIENT
Start: 2019-01-31 | End: 2019-01-31

## 2019-01-31 RX ADMIN — LIDOCAINE 1 PATCH: 50 PATCH TOPICAL at 21:03

## 2019-01-31 RX ADMIN — NAPROXEN 500 MG: 250 TABLET ORAL at 21:03

## 2019-02-01 LAB
ATRIAL RATE: 70 BPM
P AXIS: 27 DEGREES
PR INTERVAL: 140 MS
QRS AXIS: 89 DEGREES
QRSD INTERVAL: 100 MS
QT INTERVAL: 390 MS
QTC INTERVAL: 421 MS
T WAVE AXIS: 59 DEGREES
VENTRICULAR RATE: 70 BPM

## 2019-02-01 PROCEDURE — 93010 ELECTROCARDIOGRAM REPORT: CPT | Performed by: INTERNAL MEDICINE

## 2019-02-01 NOTE — DISCHARGE INSTRUCTIONS
Noncardiac Chest Pain   WHAT YOU NEED TO KNOW:   Noncardiac chest pain is pain or discomfort in your chest not caused by a heart problem  It may be caused by acid reflux, nerve or muscle problems within your esophagus, or chest wall, muscle, or rib pain  It may also be caused by panic attacks, anxiety, or depression  DISCHARGE INSTRUCTIONS:   Seek care immediately if:   · You have severe chest pain  Contact your healthcare provider if:   · Your chest pain does not get better, even with treatment  · You have questions or concerns about your condition or care  Medicines:   · Medicines  may be given to treat the cause of your chest pain  You may be given medicines to decrease pain, relieve anxiety, decrease acid reflux, or relax muscles in your esophagus  · Take your medicine as directed  Contact your healthcare provider if you think your medicine is not helping or if you have side effects  Tell him or her if you are allergic to any medicine  Keep a list of the medicines, vitamins, and herbs you take  Include the amounts, and when and why you take them  Bring the list or the pill bottles to follow-up visits  Carry your medicine list with you in case of an emergency  Cognitive therapy:  Cognitive therapy may be helpful if you have panic attacks, anxiety, or depression  It can help you change how you react to situations that tend to trigger your chest pain  Follow up with your healthcare provider as directed:  Write down your questions so you remember to ask them during your visits  © 2017 [employer] Information is for End User's use only and may not be sold, redistributed or otherwise used for commercial purposes  All illustrations and images included in CareNotes® are the copyrighted property of A D [employer] M [employer]  or Kumar Gentile  The above information is an  only  It is not intended as medical advice for individual conditions or treatments   Talk to your doctor, nurse or pharmacist before following any medical regimen to see if it is safe and effective for you  Chest Wall Pain   WHAT YOU NEED TO KNOW:   Chest wall pain may be caused by problems with the muscles, cartilage, or bones of the chest wall  Chest wall pain may also be caused by pain that spreads to your chest from another part of your body  The pain may be aching, severe, dull, or sharp  It may come and go, or it may be constant  The pain may be worse when you move in certain ways, breathe deeply, or cough  DISCHARGE INSTRUCTIONS:   Call 911 if:   · You have any of the following signs of a heart attack:      ¨ Squeezing, pressure, or pain in your chest that lasts longer than 5 minutes or returns    ¨ Discomfort or pain in your back, neck, jaw, stomach, or arm     ¨ Trouble breathing    ¨ Nausea or vomiting    ¨ Lightheadedness or a sudden cold sweat, especially with chest pain or trouble breathing    Return to the emergency department if:   · You have severe pain  Contact your healthcare provider if:   · You develop a rash  · You have other new symptoms  · Your pain does not improve, even with treatment  · You have questions or concerns about your condition or care  Medicines: You may need any of the following:  · NSAIDs , such as ibuprofen, help decrease swelling, pain, and fever  This medicine is available with or without a doctor's order  NSAIDs can cause stomach bleeding or kidney problems in certain people  If you take blood thinner medicine, always ask your healthcare provider if NSAIDs are safe for you  Always read the medicine label and follow directions  · Acetaminophen  decreases pain  It is available without a doctor's order  Ask how much to take and how often to take it  Follow directions  Acetaminophen can cause liver damage if not taken correctly  · A cream  may be applied to your chest to decrease pain  · Take your medicine as directed    Contact your healthcare provider if you think your medicine is not helping or if you have side effects  Tell him of her if you are allergic to any medicine  Keep a list of the medicines, vitamins, and herbs you take  Include the amounts, and when and why you take them  Bring the list or the pill bottles to follow-up visits  Carry your medicine list with you in case of an emergency  Follow up with your healthcare provider as directed:  Write down your questions so you remember to ask them during your visits  Self-care:   · Rest  as needed  Avoid activities that make your chest wall pain worse  · Apply heat  on your chest for 20 to 30 minutes every 2 hours for as many days as directed  Heat helps decrease pain and muscle spasms  · Apply ice  on your chest for 15 to 20 minutes every hour or as directed  Use an ice pack, or put crushed ice in a plastic bag  Cover it with a towel  Ice helps prevent tissue damage and decreases swelling and pain  © 2017 [employer] Information is for End User's use only and may not be sold, redistributed or otherwise used for commercial purposes  All illustrations and images included in CareNotes® are the copyrighted property of A [employer] M [employer] Inc  or Kumar Gentile  The above information is an  only  It is not intended as medical advice for individual conditions or treatments  Talk to your doctor, nurse or pharmacist before following any medical regimen to see if it is safe and effective for you

## 2019-02-01 NOTE — ED PROVIDER NOTES
History  Chief Complaint   Patient presents with    Chest Pain     Patient presents to ER as a walk in from home for cp since saturday  Pain is left upper chest wall with radiation to L upper back  Worse with coughing/sneezing, palpation  Denies any known injury  Denies signs of URI  Reports moderate relief with motrin, last dose yesterday  Patient is a 51-year-old male with past medical history of anxiety and no significant surgical history, presents to the emergency department complaining of left upper chest wall pain that radiates to his left upper back for the past several days  Patient reports his pain started on Saturday and he describes it as a sharp achy pain that is worse when he sneezes, moves his upper body or coughs  He reports when he takes a very deep breath it also worsens the pain  Pain comes and goes  He has been taking ibuprofen which does alleviate the pain but the pain is still present  He was seen at [facility] and was told to come to the ED if pain persisted  He denies any recent heavy lifting, strenuous activity or chest wall injury  He denies any associated symptoms other than some mild sneezing and rhinorrhea  No diaphoresis, fever, chills, headache, dizziness, cough or hemoptysis, visual disturbance or eye pain, palpitations, dyspnea, wheezing, abdominal pain, nausea, vomiting, diarrhea, constipation, blood per rectum or melena, dysuria, frequency, hematuria, flank pain, skin rash or color change, extremity swelling or pain, extremity weakness or paresthesia or other focal neurologic deficits  Denies any recent prolonged travel or immobilization  No personal or family history of DVT or PE  Family history significant for mother with coronary artery disease in her late 45s  He admits to former smoking but denies smoking currently  Denies drug use          History provided by:  Patient   used: No    Chest Pain   Associated symptoms: back pain Associated symptoms: no abdominal pain, no cough, no diaphoresis, no dizziness, no fever, no headache, no nausea, no numbness, no palpitations, no shortness of breath, not vomiting and no weakness        Prior to Admission Medications   Prescriptions Last Dose Informant Patient Reported? Taking?   ibuprofen (MOTRIN) 600 mg tablet   Yes Yes   Sig: Take 600 mg by mouth every 6 (six) hours as needed for mild pain      Facility-Administered Medications: None       Past Medical History:   Diagnosis Date    Psychiatric disorder     anxiety       History reviewed  No pertinent surgical history  History reviewed  No pertinent family history  I have reviewed and agree with the history as documented  Social History   Substance Use Topics    Smoking status: Former Smoker     Packs/day: 0 20     Types: Cigarettes    Smokeless tobacco: Never Used    Alcohol use No        Review of Systems   Constitutional: Negative for chills, diaphoresis and fever  HENT: Negative for congestion, ear pain, rhinorrhea and sore throat  Eyes: Negative for photophobia, pain and visual disturbance  Respiratory: Negative for cough, chest tightness, shortness of breath and wheezing  Cardiovascular: Positive for chest pain  Negative for palpitations and leg swelling  Gastrointestinal: Negative for abdominal pain, constipation, diarrhea, nausea and vomiting  Genitourinary: Negative for dysuria, flank pain, frequency and hematuria  Musculoskeletal: Positive for back pain  Negative for neck pain and neck stiffness  Skin: Negative for color change, pallor, rash and wound  Allergic/Immunologic: Negative for immunocompromised state  Neurological: Negative for dizziness, syncope, weakness, light-headedness, numbness and headaches  Hematological: Negative for adenopathy  Does not bruise/bleed easily  Psychiatric/Behavioral: Negative for confusion and decreased concentration     All other systems reviewed and are negative  Physical Exam  Physical Exam   Constitutional: He is oriented to person, place, and time  He appears well-developed and well-nourished  No distress  HENT:   Head: Normocephalic and atraumatic  Right Ear: External ear normal    Left Ear: External ear normal    Nose: Nose normal    Mouth/Throat: Oropharynx is clear and moist  No oropharyngeal exudate  Eyes: Pupils are equal, round, and reactive to light  Conjunctivae and EOM are normal    Neck: Normal range of motion  Neck supple  No JVD present  Cardiovascular: Normal rate, regular rhythm, normal heart sounds and intact distal pulses  Exam reveals no gallop and no friction rub  No murmur heard  Pulmonary/Chest: Effort normal and breath sounds normal  No respiratory distress  He has no wheezes  He has no rales  He exhibits tenderness  Focal tenderness in the left upper medial chest just adjacent to the sternum and over the left upper ribs  No crepitus  Abdominal: Soft  Bowel sounds are normal  He exhibits no distension and no mass  There is no tenderness  There is no rebound and no guarding  Musculoskeletal: Normal range of motion  He exhibits tenderness  He exhibits no edema  Tenderness in the left posterior trapezius muscle  Lymphadenopathy:     He has no cervical adenopathy  Neurological: He is alert and oriented to person, place, and time  No cranial nerve deficit  Skin: Skin is warm and dry  No rash noted  He is not diaphoretic  No pallor  Psychiatric: He has a normal mood and affect  His behavior is normal  Thought content normal    Nursing note and vitals reviewed        Vital Signs  ED Triage Vitals [01/31/19 2033]   Temperature Pulse Respirations Blood Pressure SpO2   98 8 °F (37 1 °C) 88 18 131/64 99 %      Temp Source Heart Rate Source Patient Position - Orthostatic VS BP Location FiO2 (%)   Oral Monitor Sitting Right arm --      Pain Score       5         Vitals:    01/31/19 2033   BP: 131/64   BP Location: Right arm   Pulse: 88   Resp: 18   Temp: 98 8 °F (37 1 °C)   TempSrc: Oral   SpO2: 99%   Weight: 54 4 kg (120 lb)   Height: 5' 6" (1 676 m)     Visual Acuity      ED Medications  Medications   lidocaine (LIDODERM) 5 % patch 1 patch (1 patch Topical Medication Applied 1/31/19 2103)   naproxen (NAPROSYN) tablet 500 mg (500 mg Oral Given 1/31/19 2103)       Diagnostic Studies  Results Reviewed     None                 XR chest 2 views   ED Interpretation by Soraya Crowley DO (01/31 2100)   No acute abnormality in the chest                  Procedures  ECG 12 Lead Documentation  Date/Time: 1/31/2019 9:08 PM  Performed by: Mio Snell by: Migdalia Marin     ECG reviewed by me, the ED Provider: yes    Patient location:  ED  Previous ECG:     Previous ECG:  Unavailable  Rate:     ECG rate:  70    ECG rate assessment: normal    Rhythm:     Rhythm: sinus rhythm    Ectopy:     Ectopy: none    QRS:     QRS axis:  Normal    QRS intervals:  Normal  Conduction:     Conduction: normal    ST segments:     ST segments:  Normal  T waves:     T waves: normal             Phone Contacts  ED Phone Contact    ED Course               PERC Rule for PE      Most Recent Value   PERC Rule for PE   Age >=50  0 Filed at: 01/31/2019 2121   HR >=100  0 Filed at: 01/31/2019 2121   O2 Sat on room air < 95%  0 Filed at: 01/31/2019 2121   History of PE or DVT  0 Filed at: 01/31/2019 2121   Recent trauma or surgery  0 Filed at: 01/31/2019 2121   Hemoptysis  0 Filed at: 01/31/2019 2121   Exogenous estrogen  0 Filed at: 01/31/2019 2121   Unilateral leg swelling  0 Filed at: 01/31/2019 2121   [address]  Rule for PE Results  0 Filed at: 01/31/2019 2121                      MDM  Number of Diagnoses or Management Options  Musculoskeletal chest pain:   Diagnosis management comments: 59-year-old male presents with intermittent pleuritic left upper chest pain, worsened by movement of the upper body, sneezing and coughing    Most likely patient has acute muscle strain, possibly costochondritis or pericarditis  Differential also includes left upper lobe pneumonia, spontaneous pneumothorax  Overall low suspicion for acute PE, aortic dissection or acute coronary syndrome  Will obtain EKG, chest x-ray and provide Lidoderm patch and Naprosyn for pain control  Amount and/or Complexity of Data Reviewed  Tests in the radiology section of CPT®: ordered and reviewed  Tests in the medicine section of CPT®: ordered and reviewed  Independent visualization of images, tracings, or specimens: yes    Patient Progress  Patient progress: improved (Patient reassessed and updated of normal EKG and chest x-ray  Once again, most likely this is musculoskeletal in origin  Advised him to follow up with his PCP  Discussed ED return parameters  Recommended NSAIDs, Lidoderm patches as needed and heating pad to the back and chest )      Disposition  Final diagnoses:   Musculoskeletal chest pain     Time reflects when diagnosis was documented in both MDM as applicable and the Disposition within this note     Time User Action Codes Description Comment    1/31/2019  9:18 PM Emilia De La Torre Add [R07 89] Musculoskeletal chest pain       ED Disposition     ED Disposition Condition Date/Time Comment    Discharge  Thu Jan 31, 2019  9:18 PM Edward Solares discharge to home/self care      Condition at discharge: Stable        Follow-up Information     Follow up With Specialties Details Why Contact Info Additional [address] [facility] Schedule an appointment as soon as possible for a visit  Sus[facility] [employer] [address] [place] [facility] Emergency Medicine Go to If symptoms worsen [address] [facility] [place] [facility] [address] ED, [address] [place], [place], [address]          Patient's Medications   Discharge Prescriptions LIDOCAINE (LIDODERM) 5 %    Apply 1 patch topically daily as needed for mild pain or moderate pain   Remove & Discard patch within 12 hours or as directed by MD       Start Date: 1/31/2019 End Date: --       Order Dose: 1 patch       Quantity: 6 patch    Refills: 0    NAPROXEN (NAPROSYN) 500 MG TABLET    Take 1 tablet (500 mg total) by mouth every 12 (twelve) hours as needed for mild pain (take with food)       Start Date: 1/31/2019 End Date: --       Order Dose: 500 mg       Quantity: 20 tablet    Refills: 0     No discharge procedures on file      ED Provider  Electronically Signed by           Felix Dyson DO  01/31/19 2120       Felix Dyson DO  01/31/19 2121

## 2019-11-23 ENCOUNTER — HOSPITAL ENCOUNTER (EMERGENCY)
Facility: HOSPITAL | Age: 24
Discharge: HOME | End: 2019-11-23
Attending: EMERGENCY MEDICINE | Admitting: EMERGENCY MEDICINE
Payer: MEDICARE

## 2019-11-23 VITALS
RESPIRATION RATE: 16 BRPM | SYSTOLIC BLOOD PRESSURE: 123 MMHG | OXYGEN SATURATION: 100 % | DIASTOLIC BLOOD PRESSURE: 58 MMHG | TEMPERATURE: 97.5 F | HEART RATE: 76 BPM

## 2019-11-23 DIAGNOSIS — R21 RASH AND NONSPECIFIC SKIN ERUPTION: Primary | ICD-10-CM

## 2019-11-23 PROCEDURE — 99282 EMERGENCY DEPT VISIT SF MDM: CPT

## 2019-11-23 PROCEDURE — 99284 EMERGENCY DEPT VISIT MOD MDM: CPT | Performed by: NURSE PRACTITIONER

## 2019-11-23 RX ORDER — CLOTRIMAZOLE AND BETAMETHASONE DIPROPIONATE 10; .64 MG/G; MG/G
CREAM TOPICAL
Qty: 30 G | Refills: 0 | Status: SHIPPED | OUTPATIENT
Start: 2019-11-23 | End: 2019-11-23 | Stop reason: SDUPTHER

## 2019-11-23 RX ORDER — CLOTRIMAZOLE AND BETAMETHASONE DIPROPIONATE 10; .64 MG/G; MG/G
CREAM TOPICAL
Qty: 45 G | Refills: 0 | Status: SHIPPED | OUTPATIENT
Start: 2019-11-23 | End: 2020-03-05

## 2020-01-01 ENCOUNTER — APPOINTMENT (EMERGENCY)
Dept: RADIOLOGY | Facility: HOSPITAL | Age: 25
End: 2020-01-01
Payer: MEDICARE

## 2020-01-01 ENCOUNTER — HOSPITAL ENCOUNTER (EMERGENCY)
Facility: HOSPITAL | Age: 25
Discharge: HOME | End: 2020-01-01
Attending: EMERGENCY MEDICINE
Payer: MEDICARE

## 2020-01-01 VITALS
TEMPERATURE: 97.9 F | DIASTOLIC BLOOD PRESSURE: 62 MMHG | SYSTOLIC BLOOD PRESSURE: 120 MMHG | BODY MASS INDEX: 19.29 KG/M2 | HEIGHT: 66 IN | RESPIRATION RATE: 18 BRPM | WEIGHT: 120 LBS | OXYGEN SATURATION: 100 % | HEART RATE: 78 BPM

## 2020-01-01 DIAGNOSIS — S69.91XA INJURY OF RIGHT THUMB: Primary | ICD-10-CM

## 2020-01-01 PROCEDURE — 73140 X-RAY EXAM OF FINGER(S): CPT

## 2020-01-01 PROCEDURE — 99283 EMERGENCY DEPT VISIT LOW MDM: CPT

## 2020-01-01 PROCEDURE — 99284 EMERGENCY DEPT VISIT MOD MDM: CPT | Performed by: PHYSICIAN ASSISTANT

## 2020-01-01 NOTE — ED PROVIDER NOTES
History  Chief Complaint   Patient presents with    Thumb Injury     Pt presents to ED with R thumb injury while wrestling yesterday stating " it bent backwards" Swelling and bruising noted      Patient is a 80-year-old male presents emergency department for evaluation guarding right thumb injury that occurred yesterday  Patient states he was wrestling when he fell on his right hand  Patient states he has had persistent pain and swelling since that time  Prior to Admission Medications   Prescriptions Last Dose Informant Patient Reported? Taking? clotrimazole-betamethasone (LOTRISONE) 1-0 05 % cream   No No   Sig: Apply to affected area 2 times daily for 21 days   ibuprofen (MOTRIN) 600 mg tablet   Yes No   Sig: Take 600 mg by mouth every 6 (six) hours as needed for mild pain      Facility-Administered Medications: None       Past Medical History:   Diagnosis Date    Psychiatric disorder     anxiety       History reviewed  No pertinent surgical history  History reviewed  No pertinent family history  I have reviewed and agree with the history as documented  Social History     Tobacco Use    Smoking status: Former Smoker     Packs/day: 0 50     Types: Cigarettes    Smokeless tobacco: Never Used   Substance Use Topics    Alcohol use: Yes     Comment: occasionally     Drug use: No        Review of Systems   Constitutional: Negative for fever  Respiratory: Negative for shortness of breath  Cardiovascular: Negative for chest pain  Musculoskeletal: Positive for joint swelling  All other systems reviewed and are negative  Physical Exam  Physical Exam   Constitutional: He is oriented to person, place, and time  He appears well-developed and well-nourished  HENT:   Head: Normocephalic and atraumatic  Musculoskeletal:        Right hand: He exhibits decreased range of motion, tenderness and swelling  Hands:  Neurological: He is alert and oriented to person, place, and time  Skin: Skin is warm  Capillary refill takes less than 2 seconds  Psychiatric: He has a normal mood and affect  His behavior is normal  Judgment and thought content normal    Vitals reviewed  Vital Signs  ED Triage Vitals [01/01/20 0024]   Temperature Pulse Respirations Blood Pressure SpO2   97 9 °F (36 6 °C) 78 18 120/62 100 %      Temp Source Heart Rate Source Patient Position - Orthostatic VS BP Location FiO2 (%)   Oral Monitor Sitting Right arm --      Pain Score       9           Vitals:    01/01/20 0024   BP: 120/62   Pulse: 78   Patient Position - Orthostatic VS: Sitting         Visual Acuity      ED Medications  Medications - No data to display    Diagnostic Studies  Results Reviewed     None                 XR thumb first digit-min 2 views RIGHT   ED Interpretation by Chris Ortiz PA-C (01/01 0054)   Neg for fracture                 Procedures  Splint application  Date/Time: 1/1/2020 1:10 AM  Performed by: Chris Ortiz PA-C  Authorized by: Chris Ortiz PA-C     Patient location:  ED  Performing Provider:  PA  Consent:     Consent obtained:  Verbal    Consent given by:  Patient    Risks discussed:  Discoloration, numbness, pain and swelling    Alternatives discussed:  No treatment, delayed treatment, alternative treatment, observation and referral  Universal protocol:     Procedure explained and questions answered to patient or proxy's satisfaction: yes      Patient identity confirmed:  Verbally with patient  Indication:     Indications: sprain/strain    Pre-procedure details:     Sensation:  Normal  Procedure details:     Laterality:  Right    Location:  Hand    Hand:  R hand    Splint type:  Thumb spica    Supplies:  Ortho-Glass  Post-procedure details:     Pain:  Improved    Sensation:  Normal    Neurovascular Exam: skin pink      Patient tolerance of procedure:   Tolerated well, no immediate complications             ED Course                               MDM  Number of Diagnoses or Management Options  Injury of right thumb:   Diagnosis management comments: Patient is a 22-year-old male presents emergency department complaints of right thumb injury that occurred yesterday  Examination is consistent with ligamentous injury of the right thumb MCP  Patient is placed in a thumb spica splint for protection and pain relief  X-ray images did not show any bony abnormality  Patient is to follow up with Hand surgery for further evaluation and treatment recommendations  Amount and/or Complexity of Data Reviewed  Tests in the radiology section of CPT®: ordered and reviewed  Independent visualization of images, tracings, or specimens: yes    Risk of Complications, Morbidity, and/or Mortality  Presenting problems: moderate  Diagnostic procedures: moderate  Management options: moderate    Patient Progress  Patient progress: stable        Disposition  Final diagnoses:   Injury of right thumb     Time reflects when diagnosis was documented in both MDM as applicable and the Disposition within this note     Time User Action Codes Description Comment    1/1/2020 12:54 AM Yoko Bake Add [I46 46EY] Injury of left thumb     1/1/2020 12:54 AM Yoko Bake Remove [V20 00DH] Injury of left thumb     1/1/2020 12:55 AM Yoko Bake Add [S69 [id number]] Injury of right thumb       ED Disposition     ED Disposition Condition Date/Time Comment    Discharge Good Wed Jan 1, 2020 12:54 AM [address] Virgen discharge to home/self care              Follow-up Information     Follow up With Specialties Details Why Contact Info    Sea French MD Orthopedic Surgery, Hand Surgery, Orthopedics Schedule an appointment as soon as possible for a visit   [address] [facility]  [address]  [facility]  [phone]            Discharge Medication List as of 1/1/2020 12:55 AM      CONTINUE these medications which have NOT CHANGED    Details   clotrimazole-betamethasone (LOTRISONE) 1-0 05 % cream Apply to affected area 2 times daily for 21 days, Print      ibuprofen (MOTRIN) 600 mg tablet Take 600 mg by mouth every 6 (six) hours as needed for mild pain, Historical Med           No discharge procedures on file      ED Provider  Electronically Signed by           Yumiko Wood PA-C  01/01/20 0111

## 2020-02-19 ENCOUNTER — APPOINTMENT (EMERGENCY)
Dept: RADIOLOGY | Facility: HOSPITAL | Age: 25
End: 2020-02-19
Payer: MEDICARE

## 2020-02-19 ENCOUNTER — HOSPITAL ENCOUNTER (EMERGENCY)
Facility: HOSPITAL | Age: 25
Discharge: HOME | End: 2020-02-19
Attending: EMERGENCY MEDICINE | Admitting: EMERGENCY MEDICINE
Payer: MEDICARE

## 2020-02-19 VITALS
OXYGEN SATURATION: 99 % | BODY MASS INDEX: 20.57 KG/M2 | TEMPERATURE: 98.5 F | SYSTOLIC BLOOD PRESSURE: 105 MMHG | HEART RATE: 83 BPM | WEIGHT: 128 LBS | RESPIRATION RATE: 17 BRPM | DIASTOLIC BLOOD PRESSURE: 52 MMHG | HEIGHT: 66 IN

## 2020-02-19 DIAGNOSIS — S80.00XA KNEE CONTUSION: Primary | ICD-10-CM

## 2020-02-19 PROCEDURE — 73564 X-RAY EXAM KNEE 4 OR MORE: CPT

## 2020-02-19 PROCEDURE — 99284 EMERGENCY DEPT VISIT MOD MDM: CPT

## 2020-02-19 PROCEDURE — 99284 EMERGENCY DEPT VISIT MOD MDM: CPT | Performed by: EMERGENCY MEDICINE

## 2020-02-19 RX ORDER — METHOCARBAMOL 500 MG/1
1000 TABLET, FILM COATED ORAL 2 TIMES DAILY
Qty: 30 TABLET | Refills: 0 | Status: SHIPPED | OUTPATIENT
Start: 2020-02-19 | End: 2020-03-05

## 2020-02-19 RX ORDER — IBUPROFEN 400 MG/1
400 TABLET ORAL ONCE
Status: COMPLETED | OUTPATIENT
Start: 2020-02-19 | End: 2020-02-19

## 2020-02-19 RX ORDER — IBUPROFEN 600 MG/1
600 TABLET ORAL EVERY 6 HOURS PRN
Qty: 30 TABLET | Refills: 0 | Status: SHIPPED | OUTPATIENT
Start: 2020-02-19 | End: 2020-03-05

## 2020-02-19 RX ORDER — METHOCARBAMOL 500 MG/1
1000 TABLET, FILM COATED ORAL ONCE
Status: COMPLETED | OUTPATIENT
Start: 2020-02-19 | End: 2020-02-19

## 2020-02-19 RX ADMIN — IBUPROFEN 400 MG: 400 TABLET ORAL at 12:14

## 2020-02-19 RX ADMIN — METHOCARBAMOL 1000 MG: 500 TABLET ORAL at 13:03

## 2020-02-19 NOTE — ED PROVIDER NOTES
History  Chief Complaint   Patient presents with    Knee Injury     Pt reports wrestling last night, hit his right knee on a steal post  Now c/o pain and swelling  25year old "" comes into the ED with cc of knee injury  On xray there is prepatellar swelling but no fracture or effusion  Plan will be for knee immobilizer, crutches, orthopedic follow-up  History provided by:  Patient   used: No    Knee Pain   Location:  Knee  Injury: yes    Pain details:     Quality:  Aching    Radiates to:  Does not radiate    Severity:  Mild    Onset quality:  Gradual    Timing:  Constant    Progression:  Unchanged  Associated symptoms: decreased ROM, stiffness and swelling    Associated symptoms: no back pain and no fatigue        None       Past Medical History:   Diagnosis Date    Psychiatric disorder     anxiety       History reviewed  No pertinent surgical history  History reviewed  No pertinent family history  I have reviewed and agree with the history as documented  Social History     Tobacco Use    Smoking status: Current Every Day Smoker     Types: Cigarettes    Smokeless tobacco: Never Used   Substance Use Topics    Alcohol use: Yes     Comment: occ    Drug use: Never       Review of Systems   Constitutional: Negative for fatigue  Musculoskeletal: Positive for stiffness  Negative for back pain  All other systems reviewed and are negative  Physical Exam  Physical Exam   Constitutional: He is oriented to person, place, and time  He appears well-developed and well-nourished  No distress  HENT:   Head: Normocephalic and atraumatic  Right Ear: External ear normal    Left Ear: External ear normal    Eyes: Conjunctivae and EOM are normal  Right eye exhibits no discharge  Left eye exhibits no discharge  No scleral icterus  Neck: Normal range of motion  Neck supple  No JVD present  No tracheal deviation present  No thyromegaly present  Cardiovascular: Normal rate and regular rhythm  Pulmonary/Chest: Effort normal and breath sounds normal  No stridor  No respiratory distress  He has no wheezes  He has no rales  Abdominal: Soft  Bowel sounds are normal  He exhibits no distension  There is no tenderness  Musculoskeletal: He exhibits no edema, tenderness or deformity  Right knee: He exhibits decreased range of motion and swelling  Neurological: He is alert and oriented to person, place, and time  No cranial nerve deficit  Coordination normal    Skin: Skin is warm and dry  He is not diaphoretic  Psychiatric: He has a normal mood and affect  His behavior is normal    Nursing note and vitals reviewed  Vital Signs  ED Triage Vitals [02/19/20 1136]   Temperature Pulse Respirations Blood Pressure SpO2   98 5 °F (36 9 °C) 83 17 105/52 99 %      Temp Source Heart Rate Source Patient Position - Orthostatic VS BP Location FiO2 (%)   Oral Monitor Sitting Right arm --      Pain Score       8           Vitals:    02/19/20 1136   BP: 105/52   Pulse: 83   Patient Position - Orthostatic VS: Sitting         Visual Acuity      ED Medications  Medications   ibuprofen (MOTRIN) tablet 400 mg (400 mg Oral Given 2/19/20 1214)   methocarbamol (ROBAXIN) tablet 1,000 mg (1,000 mg Oral Given 2/19/20 1303)       Diagnostic Studies  Results Reviewed     None                 XR knee 4+ vw right injury   Final Result by Harrie Gottron, DO (02/19 1323)   Significant prepatellar soft tissue swelling  No acute osseous abnormality              Workstation performed: [id number]                    Procedures  Procedures         ED Course                               MDM  Number of Diagnoses or Management Options  Knee contusion: new and requires workup     Amount and/or Complexity of Data Reviewed  Tests in the radiology section of CPT®: ordered and reviewed  Decide to obtain previous medical records or to obtain history from someone other than the patient: yes  Review and summarize past medical records: yes          Disposition  Final diagnoses:   Knee contusion     Time reflects when diagnosis was documented in both MDM as applicable and the Disposition within this note     Time User Action Codes Description Comment    2/19/2020 12:48 PM Lashawn Raymond Add [S80 00XA] Knee contusion       ED Disposition     ED Disposition Condition Date/Time Comment    Discharge Stable Wed Feb 19, 2020 12:48 PM Yaritza Hammer discharge to home/self care  Follow-up Information    None         Discharge Medication List as of 2/19/2020 12:51 PM      START taking these medications    Details   ibuprofen (MOTRIN) 600 mg tablet Take 1 tablet (600 mg total) by mouth every 6 (six) hours as needed for mild pain for up to 3 days, Starting Wed 2/19/2020, Until Sat 2/22/2020, Print      methocarbamol (ROBAXIN) 500 mg tablet Take 2 tablets (1,000 mg total) by mouth 2 (two) times a day, Starting Wed 2/19/2020, Print           No discharge procedures on file      PDMP Review     None          ED Provider  Electronically Signed by           Chao Yanez DO  02/20/20 5120

## 2020-03-03 ENCOUNTER — APPOINTMENT (EMERGENCY)
Dept: RADIOLOGY | Facility: HOSPITAL | Age: 25
End: 2020-03-03
Payer: MEDICARE

## 2020-03-03 ENCOUNTER — HOSPITAL ENCOUNTER (EMERGENCY)
Facility: HOSPITAL | Age: 25
Discharge: HOME | End: 2020-03-03
Attending: EMERGENCY MEDICINE | Admitting: EMERGENCY MEDICINE
Payer: MEDICARE

## 2020-03-03 VITALS
RESPIRATION RATE: 18 BRPM | TEMPERATURE: 98 F | HEART RATE: 90 BPM | BODY MASS INDEX: 19.57 KG/M2 | SYSTOLIC BLOOD PRESSURE: 127 MMHG | WEIGHT: 121.25 LBS | DIASTOLIC BLOOD PRESSURE: 72 MMHG | OXYGEN SATURATION: 99 %

## 2020-03-03 DIAGNOSIS — S62.609A FINGER FRACTURE: Primary | ICD-10-CM

## 2020-03-03 DIAGNOSIS — S62.619A: ICD-10-CM

## 2020-03-03 PROCEDURE — 73130 X-RAY EXAM OF HAND: CPT

## 2020-03-03 PROCEDURE — 99284 EMERGENCY DEPT VISIT MOD MDM: CPT | Performed by: EMERGENCY MEDICINE

## 2020-03-03 PROCEDURE — 99283 EMERGENCY DEPT VISIT LOW MDM: CPT

## 2020-03-04 NOTE — ED PROVIDER NOTES
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  He states that he was working on removed with is upon and when he became apprehensive and landed incorrectly  The point of impact appears very consistent with a boxer's fracture  X-rays ordered  History provided by:  Patient   used: No    Hand Injury   Location:  Hand  Hand location:  L hand  Injury: yes    Pain details:     Quality:  Aching    Radiates to:  Does not radiate    Severity:  Mild    Onset quality:  Gradual    Timing:  Constant    Progression:  Worsening  Dislocation: no    Prior injury to area:  No  Relieved by:  Nothing  Worsened by:  Nothing  Ineffective treatments:  None tried  Associated symptoms: no decreased range of motion, no neck pain, no numbness and no stiffness    Risk factors: no known bone disorder and no recent illness        Prior to Admission Medications   Prescriptions Last Dose Informant Patient Reported? Taking?   ibuprofen (MOTRIN) 600 mg tablet  Self Yes No   Sig: Take 600 mg by mouth every 6 (six) hours as needed for mild pain      Facility-Administered Medications: None       Past Medical History:   Diagnosis Date    Psychiatric disorder     anxiety       History reviewed  No pertinent surgical history  History reviewed  No pertinent family history  I have reviewed and agree with the history as documented      E-Cigarette/Vaping    E-Cigarette Use Never User      E-Cigarette/Vaping Substances    Nicotine No     THC No     CBD No     Flavoring No     Other No     Unknown No      Social History     Tobacco Use    Smoking status: Current Every Day Smoker     Packs/day: 0 50     Types: Cigarettes    Smokeless tobacco: Never Used   Substance Use Topics    Alcohol use: Yes     Comment: occ    Drug use: Never       Review of Systems   Musculoskeletal: Negative for neck pain and stiffness  All other systems reviewed and are negative  Physical Exam  Physical Exam   Constitutional: He is oriented to person, place, and time  He appears well-developed and well-nourished  No distress  HENT:   Head: Normocephalic and atraumatic  Right Ear: External ear normal    Left Ear: External ear normal    Eyes: Conjunctivae and EOM are normal  Right eye exhibits no discharge  Left eye exhibits no discharge  No scleral icterus  Neck: Normal range of motion  Neck supple  No JVD present  No tracheal deviation present  No thyromegaly present  Cardiovascular: Normal rate and regular rhythm  Pulmonary/Chest: Effort normal and breath sounds normal  No stridor  No respiratory distress  He has no wheezes  He has no rales  Abdominal: Soft  Bowel sounds are normal  He exhibits no distension  There is no tenderness  Musculoskeletal: Normal range of motion  He exhibits no edema, tenderness or deformity  Arms:  Neurological: He is alert and oriented to person, place, and time  No cranial nerve deficit  Coordination normal    Skin: Skin is warm and dry  He is not diaphoretic  Psychiatric: He has a normal mood and affect  His behavior is normal    Nursing note and vitals reviewed        Vital Signs  ED Triage Vitals [03/03/20 1959]   Temperature Pulse Respirations Blood Pressure SpO2   98 °F (36 7 °C) 90 18 127/72 99 %      Temp Source Heart Rate Source Patient Position - Orthostatic VS BP Location FiO2 (%)   Oral Monitor Sitting Right arm --      Pain Score       --           Vitals:    03/03/20 1959   BP: 127/72   Pulse: 90   Patient Position - Orthostatic VS: Sitting         Visual Acuity      ED Medications  Medications - No data to display    Diagnostic Studies  Results Reviewed     None                 XR hand 3+ views LEFT   ED Interpretation by Kannan Montoya DO (03/03 2032)   proxmial fifth digit fracture seen      Final Result by Jose Alfredo Martinez MD (30/25 2183)      Comminuted fracture of the base of the fifth digit proximal phalanx extending towards the proximal articular surface  Workstation performed: [id number]                    Procedures  Procedures         ED Course                               MDM  Number of Diagnoses or Management Options  Displaced fracture of proximal phalanx of finger: new and requires workup  Finger fracture: new and requires workup     Amount and/or Complexity of Data Reviewed  Tests in the radiology section of CPT®: ordered and reviewed  Decide to obtain previous medical records or to obtain history from someone other than the patient: yes  Review and summarize past medical records: yes    Patient Progress  Patient progress: stable        Disposition  Final diagnoses:   Finger fracture   Displaced fracture of proximal phalanx of finger     Time reflects when diagnosis was documented in both MDM as applicable and the Disposition within this note     Time User Action Codes Description Comment    3/3/2020  8:32 PM Jerod Pack Add [F53 262N] Finger fracture     3/3/2020  8:34 PM Jerod Pack Add [S62 619A] Displaced fracture of proximal phalanx of finger       ED Disposition     ED Disposition Condition Date/Time Comment    Discharge Stable Tue Mar 3, 2020  8:32 PM Juan Diego Fu discharge to home/self care              Follow-up Information     Follow up With Specialties Details Why Caren Araujo MD Orthopedic Surgery, Hand Surgery, Orthopedics   [facility] [address]  [phone]            Discharge Medication List as of 3/3/2020  8:34 PM      CONTINUE these medications which have NOT CHANGED    Details   ibuprofen (MOTRIN) 600 mg tablet Take 600 mg by mouth every 6 (six) hours as needed for mild pain, Historical Med clotrimazole-betamethasone (LOTRISONE) 1-0 05 % cream Apply to affected area 2 times daily for 21 days, Print      methocarbamol (ROBAXIN) 500 mg tablet Take 2 tablets (1,000 mg total) by mouth 2 (two) times a day, Starting Wed 2/19/2020, Print           No discharge procedures on file      PDMP Review     None          ED Provider  Electronically Signed by           Bambi Tinoco DO  03/06/20 203

## 2020-03-05 ENCOUNTER — OFFICE VISIT (OUTPATIENT)
Dept: OBGYN CLINIC | Facility: CLINIC | Age: 25
End: 2020-03-05
Payer: MEDICARE

## 2020-03-05 ENCOUNTER — OFFICE VISIT (OUTPATIENT)
Dept: OCCUPATIONAL THERAPY | Facility: CLINIC | Age: 25
End: 2020-03-05
Payer: MEDICARE

## 2020-03-05 VITALS
BODY MASS INDEX: 19.41 KG/M2 | HEIGHT: 66 IN | DIASTOLIC BLOOD PRESSURE: 76 MMHG | HEART RATE: 78 BPM | SYSTOLIC BLOOD PRESSURE: 120 MMHG | WEIGHT: 120.8 LBS

## 2020-03-05 DIAGNOSIS — S69.91XD INJURY OF RIGHT THUMB, SUBSEQUENT ENCOUNTER: Primary | ICD-10-CM

## 2020-03-05 DIAGNOSIS — S62.647A CLOSED NONDISPLACED FRACTURE OF PROXIMAL PHALANX OF LEFT LITTLE FINGER, INITIAL ENCOUNTER: Primary | ICD-10-CM

## 2020-03-05 DIAGNOSIS — M79.642 HAND PAIN, LEFT: ICD-10-CM

## 2020-03-05 PROCEDURE — L3913 HFO W/O JOINTS CF: HCPCS | Performed by: OCCUPATIONAL THERAPIST

## 2020-03-05 PROCEDURE — 99203 OFFICE O/P NEW LOW 30 MIN: CPT | Performed by: ORTHOPAEDIC SURGERY

## 2020-03-05 NOTE — PROGRESS NOTES
Orthosis    Diagnosis:   1  Injury of right thumb, subsequent encounter       Indication: Fracture and Motion Blocking    Location: Left  ring finger and small finger  Supplies: Custom Fit Orthotic  Orthosis type: Ulnar Gutter Hand Based  Wearing Schedule: Remove for hygiene only and Remove with Protected Technique Only as Needed  Describe Position:  MP flexion in 90 degrees    Precautions: Fracture    Patient or Caregiver expresses understanding of wearing Schedule and Precautions? 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 Yes    Written orders provided to patient?  Yes  Orders Obtained: Written  Orders Obtained from:  Dr Emilie Gtz    Return for evaluation and treatment No

## 2020-03-05 NOTE — PROGRESS NOTES
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  Patient denies previous injury to his left small finger  Patient was seen emergency department where he was placed into an ulnar gutter splint with his fingers in full extension  Patient states he is taking ibuprofen for pain but it is not helping  PAST MEDICAL HISTORY:  Past Medical History:   Diagnosis Date    Psychiatric disorder     anxiety       PAST SURGICAL HISTORY:  History reviewed  No pertinent surgical history  FAMILY HISTORY:  History reviewed  No pertinent family history  SOCIAL HISTORY:  Social History     Tobacco Use    Smoking status: Current Every Day Smoker     Packs/day: 0 50     Types: Cigarettes    Smokeless tobacco: Never Used   Substance Use Topics    Alcohol use: Yes     Comment: occ    Drug use: Never       MEDICATIONS:    Current Outpatient Medications:     ibuprofen (MOTRIN) 600 mg tablet, Take 600 mg by mouth every 6 (six) hours as needed for mild pain, Disp: , Rfl:     ALLERGIES:  Allergies   Allergen Reactions    Amoxicillin     Amoxicillin Hives       REVIEW OF SYSTEMS:  Review of Systems   Constitutional: Negative for chills, fever and unexpected weight change  HENT: Negative for hearing loss, nosebleeds and sore throat  Eyes: Negative for pain, redness and visual disturbance  Respiratory: Negative for cough, shortness of breath and wheezing  Cardiovascular: Negative for chest pain, palpitations and leg swelling  Gastrointestinal: Negative for abdominal pain, nausea and vomiting  Endocrine: Negative for polydipsia and polyuria  Genitourinary: Negative for dysuria and hematuria  Skin: Negative for rash and wound     Neurological: Negative for dizziness, light-headedness and headaches  Psychiatric/Behavioral: Negative for decreased concentration, dysphoric mood and suicidal ideas  The patient is not nervous/anxious  VITALS:  Vitals:    03/05/20 0909   BP: 120/76   Pulse: 78       LABS:  HgA1c: No results found for: HGBA1C  BMP: No results found for: GLUCOSE, CALCIUM, NA, K, CO2, CL, BUN, CREATININE    _____________________________________________________  PHYSICAL EXAMINATION:  General: well developed and well nourished, alert, oriented times 3 and appears comfortable  Psychiatric: Normal  HEENT: Trachea Midline, No torticollis  Pulmonary: No audible wheezing or strider  Cardiovascular: No discernable arrhythmia   Skin: No masses, erythema, lacerations, fluctation, ulcerations  Neurovascular: Sensation Intact to the Median, Ulnar, Radial Nerve, Motor Intact to the Median, Ulnar, Radial Nerve and Pulses Intact    MUSCULOSKELETAL EXAMINATION:  Left small finger  Fullness noted the proximal aspect of the left small finger  Irritation noted between small and ring fingers  Stiff motion at the MP joint due to splinting and extension      ___________________________________________________  STUDIES REVIEWED:  X-rays left hand performed 03/03/2020 show comminuted fracture of the base of the small finger      PROCEDURES PERFORMED:  Procedures  No Procedures performed today    _____________________________________________________  ASSESSMENT/PLAN:  Left small finger proximal phalanx fracture  * patient's small and ring fingers were flexed to 90° to ensure he was able to sustain splinting  * OT order was placed for ulnar gutter splint with MPs at 90°  * patient was advised to wear splint at all times and to avoid activity with his left hand  * patient will follow up in the office in 3 weeks        Follow Up:  Return in about 3 weeks (around 3/26/2020)      Work/school status:  declined    To Do Next Visit:  Re-evaluation of current issue  xr left hand   General Discussions:  Fracture - Nonoperative Care: The physiology of a fractured bone was discussed with the patient today  With non-displaced or minimally displaced fractures, conservative treatment such as casting or splinting often results in a functional recovery  Typically, these fractures are immobilized in either a cast or splint depending on the pattern  Radiographs are typically taken at intervals throughout the fracture healing to ensure that reduction or alignment is not lost   If the fracture loses its alignment, surgical intervention may be required to stabilize it  Medical conditions such as diabetes, osteoporosis, vitamin D deficiency, and a history of or exposure to smoking may delay or prevent fracture healing  Options between cast/splint immobilization and surgical treatment were offered and the risks and benefits of both were discussed           Scribe Attestation    I,:   Lisa Trejo am acting as a scribe while in the presence of the attending physician :        I,:   Berenice Ramirez MD personally performed the services described in this documentation    as scribed in my presence :

## 2020-05-17 ENCOUNTER — HOSPITAL ENCOUNTER (EMERGENCY)
Facility: HOSPITAL | Age: 25
Discharge: HOME | End: 2020-05-18
Attending: EMERGENCY MEDICINE | Admitting: EMERGENCY MEDICINE
Payer: MEDICARE

## 2020-05-17 ENCOUNTER — APPOINTMENT (EMERGENCY)
Dept: RADIOLOGY | Facility: HOSPITAL | Age: 25
End: 2020-05-17
Payer: MEDICARE

## 2020-05-17 VITALS
HEIGHT: 66 IN | RESPIRATION RATE: 16 BRPM | HEART RATE: 67 BPM | TEMPERATURE: 97.8 F | WEIGHT: 130 LBS | DIASTOLIC BLOOD PRESSURE: 76 MMHG | SYSTOLIC BLOOD PRESSURE: 118 MMHG | OXYGEN SATURATION: 98 % | BODY MASS INDEX: 20.89 KG/M2

## 2020-05-17 DIAGNOSIS — S63.614A SPRAIN OF RIGHT RING FINGER, INITIAL ENCOUNTER: ICD-10-CM

## 2020-05-17 DIAGNOSIS — M79.644 PAIN IN FINGER OF RIGHT HAND: Primary | ICD-10-CM

## 2020-05-17 PROCEDURE — 99283 EMERGENCY DEPT VISIT LOW MDM: CPT

## 2020-05-17 PROCEDURE — 99284 EMERGENCY DEPT VISIT MOD MDM: CPT | Performed by: PHYSICIAN ASSISTANT

## 2020-05-17 PROCEDURE — 73140 X-RAY EXAM OF FINGER(S): CPT

## 2020-10-25 ENCOUNTER — HOSPITAL ENCOUNTER (EMERGENCY)
Facility: HOSPITAL | Age: 25
Discharge: HOME | End: 2020-10-25
Attending: EMERGENCY MEDICINE | Admitting: EMERGENCY MEDICINE
Payer: MEDICARE

## 2020-10-25 VITALS
BODY MASS INDEX: 20.09 KG/M2 | SYSTOLIC BLOOD PRESSURE: 133 MMHG | DIASTOLIC BLOOD PRESSURE: 80 MMHG | RESPIRATION RATE: 15 BRPM | OXYGEN SATURATION: 100 % | HEART RATE: 56 BPM | HEIGHT: 66 IN | TEMPERATURE: 98 F | WEIGHT: 125 LBS

## 2020-10-25 DIAGNOSIS — Z20.2 POSSIBLE EXPOSURE TO STD: Primary | ICD-10-CM

## 2020-10-25 LAB
HIV 1+2 AB+HIV1 P24 AG SERPL QL IA: NORMAL
HIV1 P24 AG SER QL: NORMAL

## 2020-10-25 PROCEDURE — 87806 HIV AG W/HIV1&2 ANTB W/OPTIC: CPT | Performed by: PHYSICIAN ASSISTANT

## 2020-10-25 PROCEDURE — 87591 N.GONORRHOEAE DNA AMP PROB: CPT | Performed by: PHYSICIAN ASSISTANT

## 2020-10-25 PROCEDURE — 99282 EMERGENCY DEPT VISIT SF MDM: CPT | Performed by: PHYSICIAN ASSISTANT

## 2020-10-25 PROCEDURE — 99283 EMERGENCY DEPT VISIT LOW MDM: CPT

## 2020-10-25 PROCEDURE — 87491 CHLMYD TRACH DNA AMP PROBE: CPT | Performed by: PHYSICIAN ASSISTANT

## 2020-10-25 PROCEDURE — 36415 COLL VENOUS BLD VENIPUNCTURE: CPT | Performed by: PHYSICIAN ASSISTANT

## 2020-10-26 LAB
C TRACH DNA SPEC QL NAA+PROBE: NEGATIVE
N GONORRHOEA DNA SPEC QL NAA+PROBE: NEGATIVE

## 2021-01-22 ENCOUNTER — HOSPITAL ENCOUNTER (EMERGENCY)
Facility: HOSPITAL | Age: 26
Discharge: HOME | End: 2021-01-22
Attending: EMERGENCY MEDICINE | Admitting: EMERGENCY MEDICINE
Payer: MEDICARE

## 2021-01-22 VITALS
OXYGEN SATURATION: 100 % | BODY MASS INDEX: 20.38 KG/M2 | TEMPERATURE: 97.5 F | HEIGHT: 66 IN | RESPIRATION RATE: 16 BRPM | WEIGHT: 126.8 LBS | SYSTOLIC BLOOD PRESSURE: 95 MMHG | DIASTOLIC BLOOD PRESSURE: 65 MMHG | HEART RATE: 58 BPM

## 2021-01-22 DIAGNOSIS — K13.0 LIP LESION: Primary | ICD-10-CM

## 2021-01-22 PROCEDURE — 99283 EMERGENCY DEPT VISIT LOW MDM: CPT

## 2021-01-22 PROCEDURE — 41800 DRAINAGE OF GUM LESION: CPT | Performed by: EMERGENCY MEDICINE

## 2021-01-22 PROCEDURE — 99284 EMERGENCY DEPT VISIT MOD MDM: CPT | Performed by: EMERGENCY MEDICINE

## 2021-01-22 RX ORDER — CEPHALEXIN 500 MG/1
500 CAPSULE ORAL EVERY 12 HOURS SCHEDULED
Qty: 20 CAPSULE | Refills: 0 | Status: SHIPPED | OUTPATIENT
Start: 2021-01-22 | End: 2021-02-01

## 2021-01-22 NOTE — DISCHARGE INSTRUCTIONS
Keflex twice daily to fight infection  Follow-up with Otolaryngology for possible cyst removal  Return increasing redness swelling or any problems

## 2021-01-22 NOTE — ED PROVIDER NOTES
History  Chief Complaint   Patient presents with    Lip Swelling     Patient with swelling to lower left side of inner lip  Notes pain with eating  HPI patient is a 70-year-old male, presents with a lip lesion, reports swelling on his left lower lip that is recurrent  Patient reports a lump on his left lower lip that he intermittently has squeezed and material has come out but he reports it is recurrent  Patient denies any difficulty swallowing  There is no swelling under the tongue  There is no airway difficulty  Complains of when he bites down occasionally catches this area in bites it  He reports he has squeezed it out multiple times and it does produce liquid but seems to recur  Patient denies any specific trauma to the area  Does not know what started the lesion  He reports present over the last several months  Past medical history previously healthy  Family history noncontributory  Social history, former smoker no history of drug abuse    Prior to Admission Medications   Prescriptions Last Dose Informant Patient Reported? Taking?   ibuprofen (MOTRIN) 600 mg tablet  Self Yes No   Sig: Take 600 mg by mouth every 6 (six) hours as needed for mild pain      Facility-Administered Medications: None       Past Medical History:   Diagnosis Date    Psychiatric disorder     anxiety       History reviewed  No pertinent surgical history  History reviewed  No pertinent family history  I have reviewed and agree with the history as documented  E-Cigarette/Vaping    E-Cigarette Use Never User      E-Cigarette/Vaping Substances    Nicotine No     THC No     CBD No     Flavoring No     Other No     Unknown No      Social History     Tobacco Use    Smoking status: Former Smoker     Packs/day: 0 50     Types: Cigarettes    Smokeless tobacco: Never Used    Tobacco comment: quit a week ago      Substance Use Topics    Alcohol use: Yes     Comment: occ    Drug use: Never       Review of Systems Constitutional: Negative for fever  HENT: Negative for congestion, sore throat and trouble swallowing  Respiratory: Negative for shortness of breath and stridor  Reports lip swelling    Physical Exam  Physical Exam  Constitutional:       Appearance: Normal appearance  HENT:      Head: Normocephalic  Nose: Nose normal       Mouth/Throat:      Comments: There are no intraoral lesions there is a single approximately 2 cm circular area on the patient's left lower lip consistent with mucocele or abscess, there is significant raised area and mucosal surface, there is fluctuance within the lesion when I squeeze it  No swelling beyond the lesion, no intraoral swelling  No stridor no drooling no difficulty breathing  Neurological:      Mental Status: He is alert  Vital Signs  ED Triage Vitals [01/22/21 0844]   Temperature Pulse Respirations Blood Pressure SpO2   97 5 °F (36 4 °C) 58 16 95/65 100 %      Temp Source Heart Rate Source Patient Position - Orthostatic VS BP Location FiO2 (%)   Oral Monitor Sitting Left arm --      Pain Score       6           Vitals:    01/22/21 0844   BP: 95/65   Pulse: 58   Patient Position - Orthostatic VS: Sitting         Visual Acuity      ED Medications  Medications - No data to display    Diagnostic Studies  Results Reviewed     None                 No orders to display              Procedures  Incision and drain    Date/Time: 1/22/2021 9:00 AM  Performed by: Zak Bhagat MD  Authorized by: Zak Bhagat MD   Universal Protocol:  Consent: Verbal consent obtained  Risks and benefits: risks, benefits and alternatives were discussed    Patient location:  ED  Location:     Type:  Abscess    Size:  2    Location:  Mouth    Location Detail:  Intraoral  Anesthesia (see MAR for exact dosages):      Anesthesia method:  None  Procedure details:     Scalpel blade:  11    Approach:  Open    Incision depth:  Subcutaneous    Drainage:  Purulent  Comments:      Patient was numbed with placing ice over the lesion, with a 11  Blade I opened the lesion that was pus and clear fluid produced  There is able to completely express the lesion  ED Course          discussed with patient, appears to have a mucosal possible lip abscess, discussed incision and drainage he agrees  Some purulent material and clear yellow fluid was produced, discussed with patient may require full excision preferably by Otolaryngology  Discussed with him we do not remove moles or lesions in the emergency department but could be drained  He agreed with drainage  [facility] medical decision making 30-year-old male presents with lesion on his lip present over the last several months, reports swelling and difficulty eating  There was a fluctuant area on the lip approximately 2 cm consistent with a mucocele or abscess, area was anesthetized with ice and opened and drained  Discussed antibiotics to prevent associated infection  Discussed ENT follow-up for possible excision of the lesion if it recurs  Patient to return  Disposition  Final diagnoses:   Lip lesion - Mucocele versus abscess     Time reflects when diagnosis was documented in both MDM as applicable and the Disposition within this note     Time User Action Codes Description Comment    1/22/2021  8:53 AM Rani Shipman Add [K13 0] Lip lesion     1/22/2021  8:54 AM Rani Shipman Modify [K13 0] Lip lesion Mucocele versus abscess      ED Disposition     ED Disposition Condition Date/Time Comment    Discharge Stable Fri Jan 22, 2021  8:53 AM Darwin Petersen discharge to home/self care  Follow-up Information     Follow up With Specialties Details Why Contact Info    Jasson Shipman MD Otolaryngology   [address] [place] [address]  [phone]            Discharge Medication List as of 1/22/2021  8:56 AM      START taking these medications    Details   cephalexin (KEFLEX) 500 mg capsule Take 1 capsule (500 mg total) by mouth every 12 (twelve) hours for 10 days, Starting Fri 1/22/2021, Until Mon 2/1/2021, Normal         CONTINUE these medications which have NOT CHANGED    Details   ibuprofen (MOTRIN) 600 mg tablet Take 600 mg by mouth every 6 (six) hours as needed for mild pain, Historical Med           No discharge procedures on file      PDMP Review     None          ED Provider  Electronically Signed by           Bj Bello MD  01/22/21 4009

## 2021-02-11 ENCOUNTER — HOSPITAL ENCOUNTER (EMERGENCY)
Facility: HOSPITAL | Age: 26
Discharge: HOME | End: 2021-02-11
Attending: EMERGENCY MEDICINE | Admitting: EMERGENCY MEDICINE
Payer: MEDICARE

## 2021-02-11 ENCOUNTER — APPOINTMENT (EMERGENCY)
Dept: RADIOLOGY | Facility: HOSPITAL | Age: 26
End: 2021-02-11
Payer: MEDICARE

## 2021-02-11 VITALS
WEIGHT: 132.94 LBS | SYSTOLIC BLOOD PRESSURE: 130 MMHG | OXYGEN SATURATION: 100 % | HEART RATE: 58 BPM | BODY MASS INDEX: 21.46 KG/M2 | RESPIRATION RATE: 18 BRPM | TEMPERATURE: 97.4 F | DIASTOLIC BLOOD PRESSURE: 63 MMHG

## 2021-02-11 DIAGNOSIS — M25.532 LEFT WRIST PAIN: Primary | ICD-10-CM

## 2021-02-11 DIAGNOSIS — S66.912A STRAIN OF LEFT WRIST, INITIAL ENCOUNTER: ICD-10-CM

## 2021-02-11 PROCEDURE — 99284 EMERGENCY DEPT VISIT MOD MDM: CPT | Performed by: PHYSICIAN ASSISTANT

## 2021-02-11 PROCEDURE — 73110 X-RAY EXAM OF WRIST: CPT

## 2021-02-11 PROCEDURE — 99283 EMERGENCY DEPT VISIT LOW MDM: CPT

## 2021-02-11 RX ORDER — IBUPROFEN 600 MG/1
600 TABLET ORAL ONCE
Status: COMPLETED | OUTPATIENT
Start: 2021-02-11 | End: 2021-02-11

## 2021-02-11 RX ADMIN — IBUPROFEN 600 MG: 600 TABLET, FILM COATED ORAL at 14:24

## 2021-02-11 NOTE — Clinical Note
Aysha Mcdaniel was seen and treated in our emergency department on 2/11/2021  No use of left wrist until cleared by occupational medicine  Diagnosis: Wrist Pain    Renan    He may return on this date: If you have any questions or concerns, please don't hesitate to call        José Miguel Guevara PA-C    ______________________________           _______________          _______________  Hospital Representative                              Date                                Time

## 2021-02-11 NOTE — Clinical Note
Yaron Potter was seen and treated in our emergency department on 2/11/2021  Diagnosis: Wrist Pain    Renan     He may return on this date: If you have any questions or concerns, please don't hesitate to call        Ifeanyi Cuevas PA-C    ______________________________           _______________          _______________  Hospital Representative                              Date                                Time

## 2021-02-11 NOTE — ED PROVIDER NOTES
History  Chief Complaint   Patient presents with    Wrist Injury     Pt  reports left wrist pain since lifting packages at his work yesterday  Patient is a 23 y/o male presenting to the ED with left wrist pain x1 day  Patient says he was lifting heavy boxes all day at work yesterday and started to develop pain towards the end of the day  He denies any specific injury to the wrist  He went home and applied ice to the area and went to bed  Woke up this morning with increased pain  Pain is described as sharp at an 8/10  He took Motrin around 7AM with no relief  Prior to Admission Medications   Prescriptions Last Dose Informant Patient Reported? Taking?   ibuprofen (MOTRIN) 600 mg tablet  Self Yes No   Sig: Take 600 mg by mouth every 6 (six) hours as needed for mild pain      Facility-Administered Medications: None       Past Medical History:   Diagnosis Date    Psychiatric disorder     anxiety       History reviewed  No pertinent surgical history  History reviewed  No pertinent family history  I have reviewed and agree with the history as documented  E-Cigarette/Vaping    E-Cigarette Use Never User      E-Cigarette/Vaping Substances    Nicotine No     THC No     CBD No     Flavoring No     Other No     Unknown No      Social History     Tobacco Use    Smoking status: Former Smoker     Packs/day: 0 50     Types: Cigarettes    Smokeless tobacco: Never Used    Tobacco comment: quit a week ago  Substance Use Topics    Alcohol use: Yes     Comment: occ    Drug use: Never       Review of Systems   Constitutional: Negative for chills, fatigue and fever  HENT: Negative for congestion and sore throat  Eyes: Negative for photophobia and visual disturbance  Respiratory: Negative for cough, shortness of breath and wheezing  Cardiovascular: Negative for chest pain and palpitations  Gastrointestinal: Negative for abdominal pain, constipation, diarrhea, nausea and vomiting  Genitourinary: Negative for dysuria and flank pain  Musculoskeletal: Negative for arthralgias, back pain, gait problem, joint swelling, myalgias and neck pain  Left wrist pain    Skin: Negative for color change, pallor and rash  Neurological: Negative for dizziness, syncope, weakness and headaches  Psychiatric/Behavioral: Negative for confusion and sleep disturbance  All other systems reviewed and are negative  Physical Exam  Physical Exam  Vitals signs and nursing note reviewed  Constitutional:       General: He is awake  He is not in acute distress  Appearance: Normal appearance  He is well-developed  He is not diaphoretic  HENT:      Head: Normocephalic and atraumatic  Nose: Nose normal    Eyes:      General: Lids are normal       Conjunctiva/sclera: Conjunctivae normal       Pupils: Pupils are equal, round, and reactive to light  Neck:      Musculoskeletal: Normal range of motion and neck supple  Cardiovascular:      Rate and Rhythm: Normal rate and regular rhythm  Pulses: Normal pulses  Heart sounds: Normal heart sounds, S1 normal and S2 normal    Pulmonary:      Effort: Pulmonary effort is normal       Breath sounds: Normal breath sounds  No decreased breath sounds, wheezing, rhonchi or rales  Abdominal:      General: Abdomen is flat  Bowel sounds are normal       Palpations: Abdomen is soft  Tenderness: There is no abdominal tenderness  Musculoskeletal:      Left wrist: He exhibits decreased range of motion (secondary to pain), tenderness and bony tenderness  He exhibits no swelling, no effusion, no crepitus, no deformity and no laceration  Arms:       Right lower leg: No edema  Left lower leg: No edema  Lymphadenopathy:      Cervical: No cervical adenopathy  Skin:     General: Skin is warm and dry  Capillary Refill: Capillary refill takes less than 2 seconds  Coloration: Skin is not cyanotic or pale     Neurological: Mental Status: He is alert and oriented to person, place, and time  GCS: GCS eye subscore is 4  GCS verbal subscore is 5  GCS motor subscore is 6  Psychiatric:         Attention and Perception: Attention normal          Mood and Affect: Mood normal          Speech: Speech normal          Behavior: Behavior is cooperative  Vital Signs  ED Triage Vitals [02/11/21 1350]   Temperature Pulse Respirations Blood Pressure SpO2   (!) 97 4 °F (36 3 °C) 58 18 130/63 100 %      Temp Source Heart Rate Source Patient Position - Orthostatic VS BP Location FiO2 (%)   Oral Monitor Lying Right arm --      Pain Score       --           Vitals:    02/11/21 1350   BP: 130/63   Pulse: 58   Patient Position - Orthostatic VS: Lying         Visual Acuity      ED Medications  Medications   ibuprofen (MOTRIN) tablet 600 mg (600 mg Oral Given 2/11/21 1424)       Diagnostic Studies  Results Reviewed     None                 XR wrist 3+ views LEFT   ED Interpretation by Nicolas Cardoso PA-C (02/11 3489)   No acute fracture seen by me                 Procedures  Procedures         ED Course                                           MDM  Number of Diagnoses or Management Options  Left wrist pain:   Strain of left wrist, initial encounter:   Diagnosis management comments: Patient is a 23 y/o male presenting with left wrist pain after lifting  X-ray shows no evidence of acute fracture on my initial read  Explained to patient that an official radiologist would interpret the x-ray and he would be contacted if their interpretation differed from mine  Ace bandage applied prior to discharge  Patient given instructions on follow-up with his employer/occupational medicine as this occurred at his workplace  Differential diagnosis includes but not limited to: De Quervain's tenosynovitis, wrist sprain, tendonitis, overuse injury  The management plan was discussed in detail with the patient at bedside and all questions were answered  Prior to discharge, verbal and written instructions provided  ED return precautions discussed in detail  The patient verbalized understanding of our discussion and plan of care, and agrees to return to the Emergency Department for concerns and progression of illness  Amount and/or Complexity of Data Reviewed  Tests in the radiology section of CPT®: ordered and reviewed    Patient Progress  Patient progress: stable      Disposition  Final diagnoses:   Left wrist pain   Strain of left wrist, initial encounter     Time reflects when diagnosis was documented in both MDM as applicable and the Disposition within this note     Time User Action Codes Description Comment    2/11/2021  2:05 PM Shin Bang Add Gloria Tami Sprain of left wrist, initial encounter     2/11/2021  2:23 PM Riazene Bang Remove [S63 502A] Sprain of left wrist, initial encounter     2/11/2021  2:24 PM Riazene Bang Add [M25 532] Left wrist pain     2/11/2021  2:27 PM Paulene Bang Add [T49 904K] Strain of left wrist, initial encounter       ED Disposition     ED Disposition Condition Date/Time Comment    Discharge Stable u Feb 11, 2021  2:07 PM Tanja Austin discharge to home/self care              Follow-up Information     Follow up With Specialties Details Why Contact Info Additional Information    [address] [facility] Emergency Department Emergency Medicine  If symptoms worsen [address] [facility] [id number] [facility] Emergency Department, [address] [place], [place], [address] Tan Peters MD Family Medicine  As needed [address] [place] [address]  [address] [place] [address] [phone]       88 [facility] Call in 1 day  [address] [facility], [address] [place], [place], [address]          Patient's Medications Discharge Prescriptions    No medications on file     No discharge procedures on file      PDMP Review     None          ED Provider  Electronically Signed by           Bahman Lambert PA-C  02/11/21 0745

## 2021-02-11 NOTE — Clinical Note
Val Almonte was seen and treated in our emergency department on 2/11/2021  Diagnosis: Wrist Pain    Renan    He may return on this date: If you have any questions or concerns, please don't hesitate to call        Dontrell Perez PA-C    ______________________________           _______________          _______________  Hospital Representative                              Date                                Time

## 2021-02-11 NOTE — Clinical Note
Jazmyn Becerril was seen and treated in our emergency department on 2/11/2021  No use of left wrist until cleared by occupational medicine  Diagnosis: Wrist Pain    Renan    He may return on this date: If you have any questions or concerns, please don't hesitate to call        Lisa Greenberg PA-C    ______________________________           _______________          _______________  Hospital Representative                              Date                                Time

## 2021-03-05 ENCOUNTER — HOSPITAL ENCOUNTER (EMERGENCY)
Facility: HOSPITAL | Age: 26
Discharge: HOME | End: 2021-03-05
Attending: EMERGENCY MEDICINE | Admitting: EMERGENCY MEDICINE
Payer: MEDICARE

## 2021-03-05 VITALS
HEART RATE: 62 BPM | HEIGHT: 66 IN | SYSTOLIC BLOOD PRESSURE: 126 MMHG | RESPIRATION RATE: 18 BRPM | OXYGEN SATURATION: 99 % | WEIGHT: 130 LBS | DIASTOLIC BLOOD PRESSURE: 70 MMHG | BODY MASS INDEX: 20.89 KG/M2 | TEMPERATURE: 98.1 F

## 2021-03-05 DIAGNOSIS — K13.79 ORAL PAIN: Primary | ICD-10-CM

## 2021-03-05 DIAGNOSIS — K04.7 DENTAL ABSCESS: ICD-10-CM

## 2021-03-05 PROCEDURE — 99282 EMERGENCY DEPT VISIT SF MDM: CPT

## 2021-03-05 PROCEDURE — 96372 THER/PROPH/DIAG INJ SC/IM: CPT

## 2021-03-05 PROCEDURE — 99285 EMERGENCY DEPT VISIT HI MDM: CPT | Performed by: PHYSICIAN ASSISTANT

## 2021-03-05 RX ORDER — CLINDAMYCIN HYDROCHLORIDE 150 MG/1
450 CAPSULE ORAL 3 TIMES DAILY
Qty: 63 CAPSULE | Refills: 0 | Status: SHIPPED | OUTPATIENT
Start: 2021-03-05 | End: 2021-03-12

## 2021-03-05 RX ORDER — CLINDAMYCIN HYDROCHLORIDE 150 MG/1
450 CAPSULE ORAL ONCE
Status: COMPLETED | OUTPATIENT
Start: 2021-03-05 | End: 2021-03-05

## 2021-03-05 RX ORDER — KETOROLAC TROMETHAMINE 30 MG/ML
30 INJECTION, SOLUTION INTRAMUSCULAR; INTRAVENOUS ONCE
Status: COMPLETED | OUTPATIENT
Start: 2021-03-05 | End: 2021-03-05

## 2021-03-05 RX ADMIN — CLINDAMYCIN HYDROCHLORIDE 450 MG: 150 CAPSULE ORAL at 03:42

## 2021-03-05 RX ADMIN — KETOROLAC TROMETHAMINE 30 MG: 30 INJECTION, SOLUTION INTRAMUSCULAR at 03:43

## 2021-03-05 NOTE — ED PROVIDER NOTES
History  Chief Complaint   Patient presents with    Oral Pain     POSS INFECTED TOOTH       27-year-old otherwise healthy male who presents to the emergency department for complaint of facial swelling starting yesterday  States he bit down and felt some pain in the area of the right lower teeth, then experienced progressive right lower facial swelling throughout the day  He has a history of dental caries and dental abscess  He denies any drooling, trismus, neck pain or stiffness, fever or chills, nausea or vomiting, malaise, headache, dizziness, shortness of breath or difficulty breathing, voice change, facial numbness or droop  Patient is not a diabetic or otherwise immunocompromised  He denies a history of past or present IV drug abuse  He attempted to get appointment with his dentist and  [facility] but reports they are booked out for 1 month  Prior to Admission Medications   Prescriptions Last Dose Informant Patient Reported? Taking?   ibuprofen (MOTRIN) 600 mg tablet  Self Yes No   Sig: Take 600 mg by mouth every 6 (six) hours as needed for mild pain      Facility-Administered Medications: None       Past Medical History:   Diagnosis Date    Psychiatric disorder     anxiety       No past surgical history on file  No family history on file  I have reviewed and agree with the history as documented  E-Cigarette/Vaping    E-Cigarette Use Never User      E-Cigarette/Vaping Substances    Nicotine No     THC No     CBD No     Flavoring No     Other No     Unknown No      Social History     Tobacco Use    Smoking status: Former Smoker     Packs/day: 0 50     Types: Cigarettes    Smokeless tobacco: Never Used    Tobacco comment: quit a week ago  Substance Use Topics    Alcohol use: Yes     Comment: occ    Drug use: Never       Review of Systems   Constitutional: Negative for appetite change, chills, fatigue and fever  HENT: Positive for dental problem and facial swelling   Negative for congestion, drooling, ear pain, mouth sores, postnasal drip, rhinorrhea, sinus pressure, sinus pain, sore throat, trouble swallowing and voice change  Eyes: Negative for photophobia, pain and visual disturbance  Respiratory: Negative for shortness of breath  Gastrointestinal: Negative for nausea and vomiting  Musculoskeletal: Negative for neck pain and neck stiffness  Skin: Negative for color change and rash  Neurological: Negative for dizziness, weakness, light-headedness, numbness and headaches  Hematological: Negative for adenopathy  All other systems reviewed and are negative  Physical Exam  Physical Exam  Vitals signs reviewed  Constitutional:       General: He is awake  He is not in acute distress  Appearance: Normal appearance  He is well-developed  He is not ill-appearing or toxic-appearing  HENT:      Head: Normocephalic and atraumatic  Jaw: There is normal jaw occlusion  Tenderness and swelling (Right-sided lower jaw) present  No trismus, pain on movement or malocclusion  Right Ear: Tympanic membrane and ear canal normal       Left Ear: Tympanic membrane and ear canal normal       Nose: Nose normal       Mouth/Throat:      Lips: Pink  Mouth: Mucous membranes are moist  No oral lesions or angioedema  Dentition: Abnormal dentition  Dental caries present  No dental tenderness, gingival swelling or dental abscesses  Tongue: No lesions  Tongue does not deviate from midline  Palate: No mass and lesions  Pharynx: Oropharynx is clear  Uvula midline  No pharyngeal swelling or posterior oropharyngeal erythema  Tonsils: 0 on the right  0 on the left  Comments: No swelling of the floor of the mouth  Eyes:      Extraocular Movements: Extraocular movements intact  Conjunctiva/sclera: Conjunctivae normal       Pupils: Pupils are equal, round, and reactive to light     Neck:      Musculoskeletal: Full passive range of motion without pain, normal range of motion and neck supple  Normal range of motion  No edema, erythema, neck rigidity, crepitus or pain with movement  Cardiovascular:      Rate and Rhythm: Normal rate and regular rhythm  Pulses: Normal pulses  Pulmonary:      Effort: Pulmonary effort is normal       Breath sounds: Normal breath sounds  Musculoskeletal: Normal range of motion  Skin:     General: Skin is warm  Capillary Refill: Capillary refill takes less than 2 seconds  Findings: No erythema, lesion or rash  Neurological:      Mental Status: He is alert and oriented to person, place, and time  Cranial Nerves: Cranial nerves are intact  No cranial nerve deficit (No facial palsy)  Vital Signs  ED Triage Vitals [03/05/21 0322]   Temperature Pulse Respirations Blood Pressure SpO2   98 1 °F (36 7 °C) 62 18 126/70 99 %      Temp Source Heart Rate Source Patient Position - Orthostatic VS BP Location FiO2 (%)   Oral Monitor Lying Right arm --      Pain Score       8           Vitals:    03/05/21 0322   BP: 126/70   Pulse: 62   Patient Position - Orthostatic VS: Lying         Visual Acuity      ED Medications  Medications   clindamycin (CLEOCIN) capsule 450 mg (450 mg Oral Given 3/5/21 0342)   ketorolac (TORADOL) injection 30 mg (30 mg Intramuscular Given 3/5/21 0343)       Diagnostic Studies  Results Reviewed     None                 No orders to display              Procedures  Procedures         ED Course                                           MDM  Number of Diagnoses or Management Options  Dental abscess:   Oral pain:   Diagnosis management comments: Exam consistent with right-sided lower jaw swelling  No discrete area of induration or fluctuance on intraoral exam to indicate abscess  Multiple dental caries and dental erosion  Suspect periapical abscess  No vitals or appearance that supports sepsis    Low suspicion for deep space infection or Steve's, as no neck swelling, no toxic appearance, no swelling of floor of mouth, no immunocompromised state  Will treat with clindamycin  Discussed supportive care measures including anti-inflammatories p r n , ice pack, and soft foods  Follow up with [facility] and  dental clinic in 1 day for further evaluation and definitive care  Amount and/or Complexity of Data Reviewed  Decide to obtain previous medical records or to obtain history from someone other than the patient: yes  Obtain history from someone other than the patient: yes  Review and summarize past medical records: yes  Discuss the patient with other providers: yes    Patient Progress  Patient progress: stable (I discussed emergency department return parameters  I answered any and all questions the patient had regarding emergency department course of evaluation and treatment  The patient verbalized understanding of and agreement with plan   )      Disposition  Final diagnoses:   Oral pain   Dental abscess     Time reflects when diagnosis was documented in both MDM as applicable and the Disposition within this note     Time User Action Codes Description Comment    3/5/2021  3:38 AM Caterina Campos Add [K13 79] Oral pain     3/5/2021  3:38 AM Caterina Campos Add [K04 7] Dental abscess       ED Disposition     ED Disposition Condition Date/Time Comment    Discharge Stable Fri Mar 5, 2021  3:38 AM Yaritza Hammer discharge to home/self care              Follow-up Information     Follow up With Specialties Details Why [address] [facility] [place]  Call in 1 day For further evaluation Devon Barreto [address]  Call in 1 day For further evaluation [address]  [address] [facility] [address]  [phone]          Discharge Medication List as of 3/5/2021  3:39 AM      START taking these medications    Details   clindamycin (CLEOCIN) 150 mg capsule Take 3 capsules (450 mg total) by mouth 3 (three) times a day for 7 days, Starting Fri 3/5/2021, Until Fri 3/12/2021, Normal         CONTINUE these medications which have NOT CHANGED    Details   ibuprofen (MOTRIN) 600 mg tablet Take 600 mg by mouth every 6 (six) hours as needed for mild pain, Historical Med           No discharge procedures on file      PDMP Review     None          ED Provider  Electronically Signed by           Jerry Mccoy PA-C  03/05/21 7032

## 2021-04-04 ENCOUNTER — HOSPITAL ENCOUNTER (EMERGENCY)
Facility: HOSPITAL | Age: 26
Discharge: HOME | End: 2021-04-04
Attending: EMERGENCY MEDICINE | Admitting: EMERGENCY MEDICINE
Payer: MEDICARE

## 2021-04-04 VITALS
SYSTOLIC BLOOD PRESSURE: 110 MMHG | RESPIRATION RATE: 18 BRPM | HEART RATE: 64 BPM | TEMPERATURE: 97.8 F | DIASTOLIC BLOOD PRESSURE: 71 MMHG | OXYGEN SATURATION: 100 %

## 2021-04-04 DIAGNOSIS — B34.9 VIRAL SYNDROME: Primary | ICD-10-CM

## 2021-04-04 LAB
FLUAV RNA RESP QL NAA+PROBE: NEGATIVE
FLUBV RNA RESP QL NAA+PROBE: NEGATIVE
RSV RNA RESP QL NAA+PROBE: NEGATIVE
SARS-COV-2 RNA RESP QL NAA+PROBE: POSITIVE

## 2021-04-04 PROCEDURE — 99284 EMERGENCY DEPT VISIT MOD MDM: CPT | Performed by: EMERGENCY MEDICINE

## 2021-04-04 PROCEDURE — 0241U HB NFCT DS VIR RESP RNA 4 TRGT: CPT | Performed by: EMERGENCY MEDICINE

## 2021-04-04 PROCEDURE — 99283 EMERGENCY DEPT VISIT LOW MDM: CPT

## 2021-04-04 NOTE — Clinical Note
Edward Solares was seen and treated in our emergency department on 4/4/2021  Diagnosis:     Nico Steven  may return to work on return date  He may return on this date: 04/15/2021    Please be advised that Nico Steven has tested positive for Covid-19 and will need to quarantine for 10 days  If you have any questions or concerns, please don't hesitate to call        Lisa Dominguez RN    ______________________________           _______________          _______________  Hospital Representative                              Date                                Time

## 2021-04-04 NOTE — ED PROVIDER NOTES
History  Chief Complaint   Patient presents with    Flu Symptoms     Patient co "chills, body aches, loss of taste and smell" that started wednesday  Patient states "I had a wrestling match with someone and turns out they had covid "      Patient is a 24-year-old male no past medical history presenting with flu-like symptoms  Patient states that for the last week he has had chills, body aches and low back pain, loss of taste and smell all of which have resolved of the last several days  He notes nasal congestion as well as cough productive of clear sputum and intermittent bilateral sinus pressure which radiates to his ears and neck but denies hemoptysis, fever, shortness breath, chest pain, dizziness, nausea or vomiting  He states he was taking naproxen and ibuprofen intermittently for his symptoms and states it was helping  Has not taken any medication currently as his symptoms have resolved  States that he was in a wrestling match last week with someone who was recently told him that they tested positive for COVID-19  Prior to Admission Medications   Prescriptions Last Dose Informant Patient Reported? Taking?   ibuprofen (MOTRIN) 600 mg tablet  Self Yes No   Sig: Take 600 mg by mouth every 6 (six) hours as needed for mild pain      Facility-Administered Medications: None       Past Medical History:   Diagnosis Date    Psychiatric disorder     anxiety       History reviewed  No pertinent surgical history  History reviewed  No pertinent family history  I have reviewed and agree with the history as documented  E-Cigarette/Vaping    E-Cigarette Use Never User      E-Cigarette/Vaping Substances    Nicotine No     THC No     CBD No     Flavoring No     Other No     Unknown No      Social History     Tobacco Use    Smoking status: Former Smoker     Packs/day: 0 50     Types: Cigarettes    Smokeless tobacco: Never Used    Tobacco comment: quit a week ago      Substance Use Topics    Alcohol use: Yes     Comment: occ    Drug use: Never       Review of Systems   All other systems reviewed and are negative  Physical Exam  Physical Exam  Vitals signs reviewed  Constitutional:       General: He is not in acute distress  Appearance: Normal appearance  He is not ill-appearing  HENT:      Mouth/Throat:      Mouth: Mucous membranes are moist    Eyes:      Extraocular Movements: Extraocular movements intact  Conjunctiva/sclera: Conjunctivae normal    Neck:      Musculoskeletal: Neck supple  Cardiovascular:      Rate and Rhythm: Normal rate and regular rhythm  Heart sounds: Normal heart sounds  Pulmonary:      Effort: Pulmonary effort is normal       Breath sounds: Normal breath sounds  Abdominal:      General: Abdomen is flat  Palpations: Abdomen is soft  Tenderness: There is no abdominal tenderness  Musculoskeletal: Normal range of motion  General: No swelling or tenderness  Right lower leg: No edema  Skin:     General: Skin is warm and dry  Neurological:      General: No focal deficit present  Mental Status: He is alert        Coordination: Coordination normal       Gait: Gait normal    Psychiatric:         Mood and Affect: Mood normal          Vital Signs  ED Triage Vitals [04/04/21 1819]   Temperature Pulse Respirations Blood Pressure SpO2   97 8 °F (36 6 °C) 64 18 110/71 100 %      Temp Source Heart Rate Source Patient Position - Orthostatic VS BP Location FiO2 (%)   Tympanic Monitor Sitting Left arm --      Pain Score       --           Vitals:    04/04/21 1819   BP: 110/71   Pulse: 64   Patient Position - Orthostatic VS: Sitting         Visual Acuity      ED Medications  Medications - No data to display    Diagnostic Studies  Results Reviewed     Procedure Component Value Units Date/Time    COVID19, Influenza A/B, RSV PCR, [facility] [[id number]]  (Abnormal) Collected: 04/04/21 1936    Lab Status: Final result Specimen: Nares from Nasopharyngeal Swab Updated: 04/04/21 2025     SARS-CoV-2 Positive     INFLUENZA A PCR Negative     INFLUENZA B PCR Negative     RSV PCR Negative    Narrative: This test has been authorized by FDA under an EUA (Emergency Use Assay) for use by authorized laboratories  Clinical caution and judgement should be used with the interpretation of these results with consideration of the clinical impression and other laboratory testing  Testing reported as "Positive" or "Negative" has been proven to be accurate according to standard laboratory validation requirements  All testing is performed with control materials showing appropriate reactivity at standard intervals  No orders to display              Procedures  Procedures         ED Course                                           MDM  Number of Diagnoses or Management Options  Viral syndrome:   Diagnosis management comments: Patient is a 42-year-old male no past medical history presenting with flu-like symptoms  Patient is well-appearing bedside stable vitals and in no acute distress  He notes he is asymptomatic currently and has no significant physical exam findings, saturating appropriately on room air  This time will give testing for COVID-19 have advised that he quarantine regardless test for 10 days post symptom onset if he is symptom-free at that time  Patient states he understands  Disposition  Final diagnoses:   Viral syndrome     Time reflects when diagnosis was documented in both MDM as applicable and the Disposition within this note     Time User Action Codes Description Comment    4/4/2021  7:15 PM Carlos Niceica Add [B34 9] Viral syndrome       ED Disposition     ED Disposition Condition Date/Time Comment    Discharge Stable Sun Apr 4, 2021  7:14 PM Luana Ramon discharge to home/self care              Follow-up Information     Follow up With Specialties Details Why Teresita Sims MD Family Medicine In 1 week  [address]  [address] [facility]  [phone]            Discharge Medication List as of 4/4/2021  7:15 PM      CONTINUE these medications which have NOT CHANGED    Details   ibuprofen (MOTRIN) 600 mg tablet Take 600 mg by mouth every 6 (six) hours as needed for mild pain, Historical Med           No discharge procedures on file      PDMP Review     None          ED Provider  Electronically Signed by           Lindsay De Leon DO  04/05/21 5172

## 2021-05-03 ENCOUNTER — HOSPITAL ENCOUNTER (EMERGENCY)
Facility: HOSPITAL | Age: 26
Discharge: HOME | End: 2021-05-03
Attending: EMERGENCY MEDICINE | Admitting: EMERGENCY MEDICINE
Payer: MEDICARE

## 2021-05-03 VITALS
HEART RATE: 58 BPM | SYSTOLIC BLOOD PRESSURE: 116 MMHG | DIASTOLIC BLOOD PRESSURE: 59 MMHG | RESPIRATION RATE: 16 BRPM | TEMPERATURE: 98.3 F | OXYGEN SATURATION: 98 %

## 2021-05-03 DIAGNOSIS — Z20.2 STD EXPOSURE: Primary | ICD-10-CM

## 2021-05-03 PROCEDURE — 87491 CHLMYD TRACH DNA AMP PROBE: CPT | Performed by: PHYSICIAN ASSISTANT

## 2021-05-03 PROCEDURE — 80074 ACUTE HEPATITIS PANEL: CPT | Performed by: PHYSICIAN ASSISTANT

## 2021-05-03 PROCEDURE — 99283 EMERGENCY DEPT VISIT LOW MDM: CPT

## 2021-05-03 PROCEDURE — 36415 COLL VENOUS BLD VENIPUNCTURE: CPT | Performed by: PHYSICIAN ASSISTANT

## 2021-05-03 PROCEDURE — 86696 HERPES SIMPLEX TYPE 2 TEST: CPT | Performed by: PHYSICIAN ASSISTANT

## 2021-05-03 PROCEDURE — 86695 HERPES SIMPLEX TYPE 1 TEST: CPT | Performed by: PHYSICIAN ASSISTANT

## 2021-05-03 PROCEDURE — 96372 THER/PROPH/DIAG INJ SC/IM: CPT

## 2021-05-03 PROCEDURE — 99284 EMERGENCY DEPT VISIT MOD MDM: CPT | Performed by: PHYSICIAN ASSISTANT

## 2021-05-03 PROCEDURE — 87591 N.GONORRHOEAE DNA AMP PROB: CPT | Performed by: PHYSICIAN ASSISTANT

## 2021-05-03 RX ORDER — AZITHROMYCIN 500 MG/1
1000 TABLET, FILM COATED ORAL ONCE
Status: COMPLETED | OUTPATIENT
Start: 2021-05-03 | End: 2021-05-03

## 2021-05-03 RX ADMIN — AZITHROMYCIN 1000 MG: 500 TABLET, FILM COATED ORAL at 11:00

## 2021-05-03 RX ADMIN — LIDOCAINE HYDROCHLORIDE 250 MG: 10 INJECTION, SOLUTION EPIDURAL; INFILTRATION; INTRACAUDAL; PERINEURAL at 11:00

## 2021-05-03 NOTE — ED PROVIDER NOTES
History  Chief Complaint   Patient presents with    Exposure to STD     pt reports being sent via pcp for std panel, no complaints     22year old male with no significant past medical history presents to the emergency department with chief complaint of possible STD exposure  Patient reports he received a phone call from his now ex-girlfriend recently telling him that she had an STD and he may have been exposed  Patient comes in here today requesting testing for possible STD exposure  He denies any specific time of onset  There are no localized symptoms  He denies any specific genital rashes, dysuria, hematuria, urethral discharge  No aggravating or alleviating factors  Patient states that he is here today to be tested for possible STD exposure  He is unsure what STD his ex-girlfriend might have had  He is concerned because he states he has a daughter and wants to make sure that he is okay  He did specifically request hepatitis and herpes testing  History provided by:  Patient   used: No    Exposure to STD  Associated symptoms: no abdominal pain, no chest pain, no congestion, no cough, no diarrhea, no ear pain, no fatigue, no fever, no headaches, no myalgias, no nausea, no rash, no rhinorrhea, no shortness of breath, no sore throat, no vomiting and no wheezing        Prior to Admission Medications   Prescriptions Last Dose Informant Patient Reported? Taking?   ibuprofen (MOTRIN) 600 mg tablet  Self Yes No   Sig: Take 600 mg by mouth every 6 (six) hours as needed for mild pain      Facility-Administered Medications: None       Past Medical History:   Diagnosis Date    Psychiatric disorder     anxiety       History reviewed  No pertinent surgical history  History reviewed  No pertinent family history  I have reviewed and agree with the history as documented      E-Cigarette/Vaping    E-Cigarette Use Never User      E-Cigarette/Vaping Substances    Nicotine No     THC No  CBD No     Flavoring No     Other No     Unknown No      Social History     Tobacco Use    Smoking status: Former Smoker     Packs/day: 0 50     Types: Cigarettes    Smokeless tobacco: Never Used    Tobacco comment: quit a week ago  Substance Use Topics    Alcohol use: Yes     Comment: occ    Drug use: Never       Review of Systems   Constitutional: Negative for activity change, appetite change, chills, diaphoresis, fatigue, fever and unexpected weight change  HENT: Negative for congestion, dental problem, drooling, ear discharge, ear pain, facial swelling, hearing loss, mouth sores, nosebleeds, postnasal drip, rhinorrhea, sinus pressure, sinus pain, sneezing, sore throat, tinnitus, trouble swallowing and voice change  Eyes: Negative for photophobia, pain, discharge, redness, itching and visual disturbance  Respiratory: Negative for cough, choking, chest tightness, shortness of breath, wheezing and stridor  Cardiovascular: Negative for chest pain, palpitations and leg swelling  Gastrointestinal: Negative for abdominal distention, abdominal pain, anal bleeding, blood in stool, constipation, diarrhea, nausea, rectal pain and vomiting  Endocrine: Negative for cold intolerance, heat intolerance, polydipsia, polyphagia and polyuria  Genitourinary: Negative for decreased urine volume, difficulty urinating, dysuria, flank pain, frequency, hematuria and urgency  Musculoskeletal: Negative for arthralgias, back pain, gait problem, joint swelling, myalgias, neck pain and neck stiffness  Skin: Negative for color change, pallor, rash and wound  Allergic/Immunologic: Negative for environmental allergies, food allergies and immunocompromised state  Neurological: Negative for dizziness, tremors, seizures, syncope, facial asymmetry, speech difficulty, weakness, light-headedness, numbness and headaches  Hematological: Negative for adenopathy  Does not bruise/bleed easily  Psychiatric/Behavioral: Negative for agitation, confusion and hallucinations  The patient is not nervous/anxious  All other systems reviewed and are negative  Physical Exam  Physical Exam  Vitals signs and nursing note reviewed  Constitutional:       General: He is not in acute distress  Appearance: Normal appearance  Comments: /59 (BP Location: Left arm)   Pulse 58   Temp 98 3 °F (36 8 °C) (Oral)   Resp 16   SpO2 98%      HENT:      Head: Normocephalic and atraumatic  Right Ear: External ear normal       Left Ear: External ear normal       Nose: Nose normal    Eyes:      General: No scleral icterus  Right eye: No discharge  Left eye: No discharge  Conjunctiva/sclera: Conjunctivae normal    Neck:      Musculoskeletal: Normal range of motion and neck supple  No neck rigidity or muscular tenderness  Cardiovascular:      Rate and Rhythm: Normal rate  Pulses: Normal pulses  Pulmonary:      Effort: Pulmonary effort is normal  No respiratory distress  Musculoskeletal: Normal range of motion  General: No deformity or signs of injury  Skin:     Coloration: Skin is not jaundiced  Findings: No erythema or rash  Neurological:      General: No focal deficit present  Mental Status: He is alert and oriented to person, place, and time  Mental status is at baseline        Gait: Gait normal    Psychiatric:         Mood and Affect: Mood normal          Behavior: Behavior normal          Vital Signs  ED Triage Vitals [05/03/21 0933]   Temperature Pulse Respirations Blood Pressure SpO2   98 3 °F (36 8 °C) 58 16 116/59 98 %      Temp Source Heart Rate Source Patient Position - Orthostatic VS BP Location FiO2 (%)   Oral Monitor Sitting Left arm --      Pain Score       --           Vitals:    05/03/21 0933   BP: 116/59   Pulse: 58   Patient Position - Orthostatic VS: Sitting         Visual Acuity      ED Medications  Medications   cefTRIAXone (ROCEPHIN) 250 mg in lidocaine (PF) (XYLOCAINE-MPF) 1 % IM only syringe (250 mg Intramuscular Given 5/3/21 1100)   azithromycin (ZITHROMAX) tablet 1,000 mg (1,000 mg Oral Given 5/3/21 1100)       Diagnostic Studies  Results Reviewed     Procedure Component Value Units Date/Time    Herpes I/II IgG RITESH w Reflex to HSV-2 [[id number]] Collected: 05/03/21 1045    Lab Status: In process Specimen: Arm, Left Updated: 05/03/21 1050    Hepatitis panel, acute [[id number]] Collected: 05/03/21 1045    Lab Status: In process Specimen: Blood from Arm, Left Updated: 05/03/21 1050    Chlamydia/GC amplified DNA by PCR [[id number]] Collected: 05/03/21 1045    Lab Status: In process Specimen: Urine, Other Updated: 05/03/21 1050                 No orders to display              Procedures  Procedures         ED Course                             SBIRT 22yo+      Most Recent Value   SBIRT (23 yo +)   In order to provide better care to our patients, we are screening all of our patients for alcohol and drug use  Would it be okay to ask you these screening questions? Yes Filed at: 05/03/2021 1052   Initial Alcohol Screen: US AUDIT-C    1  How often do you have a drink containing alcohol?  0 Filed at: 05/03/2021 1052   2  How many drinks containing alcohol do you have on a typical day you are drinking? 0 Filed at: 05/03/2021 1052   3a  Male UNDER 65: How often do you have five or more drinks on one occasion? 0 Filed at: 05/03/2021 1052   3b  FEMALE Any Age, or MALE 65+: How often do you have 4 or more drinks on one occassion? 0 Filed at: 05/03/2021 1052   Audit-C Score  0 Filed at: 05/03/2021 1052   JAMES: How many times in the past year have you    Used an illegal drug or used a prescription medication for non-medical reasons?   Never Filed at: 05/03/2021 1052                    MDM  Number of Diagnoses or Management Options  STD exposure: new and requires workup  Diagnosis management comments: ddx includes but is not limited to trichomoniasis, syphilis, gonorrhea, chlamydia, UTI, HIV, hepatitis, herpes symplex virus, LVG  Plan STD testing  Amount and/or Complexity of Data Reviewed  Clinical lab tests: ordered  Review and summarize past medical records: yes    Risk of Complications, Morbidity, and/or Mortality  General comments: Discussed with patient will send hepatitis, herpes testing, GC and chlamydia  Discussed will presumptively treat with Rocephin and azithromycin for possible STD exposure  Discussed with patient will contact him if STD test results are positive  Discussed if his results are positive he has duty to warn any recent sexual partners within the past 6 weeks regarding his positive test that they can be tested  Discussed avoid sexual intercourse for the next 2 weeks after treatment  Discussed follow up with primary care physician and outpatient STD testing clinics as needed for further evaluation  Reviewed reasons to return to the emergency department  Reviewed reasons to return to ed  Patient verbalized understanding of diagnosis and agreement with discharge plan of care as well as understanding of reasons to return to ed        Patient was seen during the outbreak of the corona virus epidemic   Resources are limited due to the severity of patient illnesses associated with virus   Testing is also limited at this time   Discussed with patient at the time of this evaluation   Due to the fact that limited resources are available -treatment options are limited        Patient Progress  Patient progress: stable      Disposition  Final diagnoses:   STD exposure     Time reflects when diagnosis was documented in both MDM as applicable and the Disposition within this note     Time User Action Codes Description Comment    5/3/2021 10:20 AM Lino Babb [Z20 2] STD exposure       ED Disposition     ED Disposition Condition Date/Time Comment    Discharge Stable Mon May 3, 2021 10:20 AM Payton Miles discharge to home/self care  Follow-up Information     Follow up With Specialties Details Why Contact Info Additional Information    Golden Nickerson MD Family Medicine Call in 2 days for further evaluation of symptoms [address] [place] [id number] [facility] Emergency Department Emergency Medicine Go to  If symptoms worsen 34 [facility] 109 [facility] Emergency Department, [address] [place] [address], [id number]          Discharge Medication List as of 5/3/2021 10:20 AM      CONTINUE these medications which have NOT CHANGED    Details   ibuprofen (MOTRIN) 600 mg tablet Take 600 mg by mouth every 6 (six) hours as needed for mild pain, Historical Med           No discharge procedures on file      PDMP Review     None          ED Provider  Electronically Signed by           Erich Watters PA-C  05/03/21 1120

## 2021-05-04 LAB
C TRACH DNA SPEC QL NAA+PROBE: NEGATIVE
HAV IGM SER QL: NORMAL
HBV CORE IGM SER QL: NORMAL
HBV SURFACE AG SER QL: NORMAL
HCV AB SER QL: NORMAL
HSV1 IGG SER IA-ACNC: 43.6 INDEX (ref 0–0.9)
HSV2 IGG SER IA-ACNC: <0.91 INDEX (ref 0–0.9)
N GONORRHOEA DNA SPEC QL NAA+PROBE: NEGATIVE

## 2021-05-23 ENCOUNTER — HOSPITAL ENCOUNTER (EMERGENCY)
Facility: HOSPITAL | Age: 26
Discharge: HOME | End: 2021-05-23
Attending: EMERGENCY MEDICINE
Payer: MEDICARE

## 2021-05-23 ENCOUNTER — APPOINTMENT (EMERGENCY)
Dept: RADIOLOGY | Facility: HOSPITAL | Age: 26
End: 2021-05-23
Payer: MEDICARE

## 2021-05-23 VITALS
BODY MASS INDEX: 21.79 KG/M2 | SYSTOLIC BLOOD PRESSURE: 127 MMHG | HEART RATE: 64 BPM | OXYGEN SATURATION: 98 % | DIASTOLIC BLOOD PRESSURE: 66 MMHG | RESPIRATION RATE: 18 BRPM | TEMPERATURE: 97.9 F | WEIGHT: 135 LBS

## 2021-05-23 DIAGNOSIS — S63.502A SPRAIN OF LEFT WRIST, INITIAL ENCOUNTER: Primary | ICD-10-CM

## 2021-05-23 PROCEDURE — 73110 X-RAY EXAM OF WRIST: CPT

## 2021-05-23 PROCEDURE — 99283 EMERGENCY DEPT VISIT LOW MDM: CPT

## 2021-05-23 PROCEDURE — 99284 EMERGENCY DEPT VISIT MOD MDM: CPT | Performed by: PHYSICIAN ASSISTANT

## 2021-05-23 NOTE — Clinical Note
Payton Miles was seen and treated in our emergency department on 5/23/2021  Diagnosis: sprain of left wrist    Thomas Mckeon  may return to work on return date  He may return on this date: 05/25/2021         If you have any questions or concerns, please don't hesitate to call        Katherine Miguel RN    ______________________________           _______________          _______________  Hospital Representative                              Date                                Time

## 2021-05-24 NOTE — ED PROVIDER NOTES
History  Chief Complaint   Patient presents with    Wrist Injury     pt reports falling onto outstretched L hand today, reports wrist pain/swelling     Patient is a 25-year-old male presents emergency department with complaints of left wrist pain  Patient states that he was running when he slipped and fell directly onto his left wrist   Injury occurred today  He denies any numbness or tingling  He denies any head injury  Prior to Admission Medications   Prescriptions Last Dose Informant Patient Reported? Taking?   ibuprofen (MOTRIN) 600 mg tablet  Self Yes No   Sig: Take 600 mg by mouth every 6 (six) hours as needed for mild pain      Facility-Administered Medications: None       Past Medical History:   Diagnosis Date    Psychiatric disorder     anxiety       History reviewed  No pertinent surgical history  History reviewed  No pertinent family history  I have reviewed and agree with the history as documented  E-Cigarette/Vaping    E-Cigarette Use Never User      E-Cigarette/Vaping Substances    Nicotine No     THC No     CBD No     Flavoring No     Other No     Unknown No      Social History     Tobacco Use    Smoking status: Former Smoker     Packs/day: 0 50     Types: Cigarettes    Smokeless tobacco: Never Used    Tobacco comment: quit a week ago  Substance Use Topics    Alcohol use: Yes     Comment: occ    Drug use: Never       Review of Systems   Constitutional: Negative for fever  Respiratory: Negative for shortness of breath  Cardiovascular: Negative for chest pain  Musculoskeletal: Positive for arthralgias  All other systems reviewed and are negative  Physical Exam  Physical Exam  Vitals signs reviewed  Constitutional:       Appearance: Normal appearance  HENT:      Nose: Nose normal       Mouth/Throat:      Mouth: Mucous membranes are moist    Eyes:      Extraocular Movements: Extraocular movements intact        Conjunctiva/sclera: Conjunctivae normal  Pupils: Pupils are equal, round, and reactive to light  Cardiovascular:      Rate and Rhythm: Normal rate and regular rhythm  Pulses: Normal pulses  Heart sounds: Normal heart sounds  Pulmonary:      Effort: Pulmonary effort is normal       Breath sounds: Normal breath sounds  Musculoskeletal:      Left wrist: He exhibits tenderness  He exhibits normal range of motion and no swelling  Comments: Tenderness to palpation along the ulnar aspect of the left wrist    Skin:     General: Skin is warm  Capillary Refill: Capillary refill takes less than 2 seconds  Neurological:      General: No focal deficit present  Mental Status: He is alert and oriented to person, place, and time  Psychiatric:         Mood and Affect: Mood normal          Behavior: Behavior normal          Thought Content: Thought content normal          Judgment: Judgment normal          Vital Signs  ED Triage Vitals [05/23/21 2242]   Temperature Pulse Respirations Blood Pressure SpO2   97 9 °F (36 6 °C) 64 18 127/66 98 %      Temp Source Heart Rate Source Patient Position - Orthostatic VS BP Location FiO2 (%)   Oral Monitor Sitting Left arm --      Pain Score       8           Vitals:    05/23/21 2242   BP: 127/66   Pulse: 64   Patient Position - Orthostatic VS: Sitting         Visual Acuity      ED Medications  Medications - No data to display    Diagnostic Studies  Results Reviewed     None                 XR wrist 3+ views LEFT   ED Interpretation by Familia Kaur PA-C (05/23 2311)   Neg for fx                 Procedures  Procedures         ED Course                             SBIRT 20yo+      Most Recent Value   SBIRT (25 yo +)   In order to provide better care to our patients, we are screening all of our patients for alcohol and drug use  Would it be okay to ask you these screening questions? Yes Filed at: 05/23/2021 6895   Initial Alcohol Screen: US AUDIT-C    1   How often do you have a drink containing alcohol?  0 Filed at: 05/23/2021 2245   2  How many drinks containing alcohol do you have on a typical day you are drinking? 0 Filed at: 05/23/2021 2245   3a  Male UNDER 65: How often do you have five or more drinks on one occasion? 0 Filed at: 05/23/2021 2245   Audit-C Score  0 Filed at: 05/23/2021 2245   JAMES: How many times in the past year have you    Used an illegal drug or used a prescription medication for non-medical reasons? Never Filed at: 05/23/2021 2245                    MDM  Number of Diagnoses or Management Options  Sprain of left wrist, initial encounter:   Diagnosis management comments: Patient is a 27-year-old male presents emergency department with complaints of left wrist pain  Patient states that he was running when he slipped and fell directly onto his left wrist   Injury occurred today  He denies any numbness or tingling  He denies any head injury  On examination, patient has tenderness to palpation along the ulnar aspect of the left wrist   X-ray images do not demonstrate any definitive bony abnormality  Patient is placed in a removable wrist brace  He information for orthopedic hand surgery was given  Return parameters were discussed  Patient stable for discharge         Amount and/or Complexity of Data Reviewed  Tests in the radiology section of CPT®: ordered and reviewed  Independent visualization of images, tracings, or specimens: yes    Risk of Complications, Morbidity, and/or Mortality  Presenting problems: low  Diagnostic procedures: low  Management options: low    Patient Progress  Patient progress: stable      Disposition  Final diagnoses:   Sprain of left wrist, initial encounter     Time reflects when diagnosis was documented in both MDM as applicable and the Disposition within this note     Time User Action Codes Description Comment    5/23/2021 11:12 PM Nanine Gowers Add [H53 715A] Sprain of left wrist, initial encounter       ED Disposition     ED Disposition Condition Date/Time Comment    Discharge Good Sun May 23, 2021 2312 Yaakov Enriquez discharge to home/self care  Follow-up Information     Follow up With Specialties Details Why Contact Info Additional [address] [facility] Orthopedic Surgery Schedule an appointment as soon as possible for a visit   [address] [place] [address] [place] [id number]  [address] [place] [address] Specialists [facility], [address], [facility], [place], [address] [phone]          Patient's Medications   Discharge Prescriptions    No medications on file     No discharge procedures on file      PDMP Review     None          ED Provider  Electronically Signed by           Wilder Rosario PA-C  05/23/21 7546

## 2021-06-02 ENCOUNTER — HOSPITAL ENCOUNTER (EMERGENCY)
Facility: HOSPITAL | Age: 26
Discharge: HOME | End: 2021-06-02
Attending: EMERGENCY MEDICINE | Admitting: EMERGENCY MEDICINE
Payer: MEDICARE

## 2021-06-02 VITALS
DIASTOLIC BLOOD PRESSURE: 82 MMHG | SYSTOLIC BLOOD PRESSURE: 155 MMHG | OXYGEN SATURATION: 99 % | HEART RATE: 61 BPM | TEMPERATURE: 98 F | RESPIRATION RATE: 18 BRPM

## 2021-06-02 DIAGNOSIS — Z02.89 ENCOUNTER TO OBTAIN EXCUSE FROM WORK: Primary | ICD-10-CM

## 2021-06-02 PROCEDURE — 99283 EMERGENCY DEPT VISIT LOW MDM: CPT

## 2021-06-02 PROCEDURE — 99282 EMERGENCY DEPT VISIT SF MDM: CPT | Performed by: EMERGENCY MEDICINE

## 2021-06-02 NOTE — Clinical Note
Rickey Bryan was seen and treated in our emergency department on 6/2/2021  Diagnosis:     Santososwald Santana    He may return on this date:     Patient had COVID-19 symptoms from 4/18/2021 to 5/01/2021 including cough per his PCP's work note  He has been cleared to return to work by his PCP  If you have any questions or concerns, please don't hesitate to call        Marcelino Menendez, DO    ______________________________           _______________          _______________  Hospital Representative                              Date                                Time

## 2021-06-02 NOTE — ED PROVIDER NOTES
Pt Name: Ross Christensen  MRN: [id number]  [place] 1995  Age/Sex: 22 y o  male  Date of evaluation: 6/2/2021  PCP: Juan Jose Vaz MD    CHIEF COMPLAINT    Chief Complaint   Patient presents with    Medical Problem     Needs Covid test for work         HPI and MDM    22 y o  male presenting with needing a work note  Patient states he was tested positive and/or symptomatic for COVID-19 on 04/03/2021, despite after his quarantine  , he had continuous symptoms including chronic cough and headache  Did not feel well and was not ready return to work  Patient had seen his family doctor, and was cleared to return to work on 05/27/2021  He received a work note that showed he had COVID symptoms from 04/03/2021 to 5/27/21  However, his work would like him to have a work note that shows he had symptoms from 04/18/2021 to 05/01/2021  Patient currently has no symptoms  States he is feeling well  Clearly, patient has a work note (as shown below) from his family doctor that is inclusive of the dates (4/18/21 - 5/01/21) requested by his work  I can see in the charts that patient had positive COVID-19 test on 04/04/2021  Given he already has his family doctor's work note that is inclusive of the dates that his work requires, will write him another work note with the specific dates (4/18/21 - 5/01/21) as asked  Patient states he was unable to obtain another note by his family doctor  No other issues at this time  PCP f/u as needed  Past Medical and Surgical History    Past Medical History:   Diagnosis Date    Psychiatric disorder     anxiety       History reviewed  No pertinent surgical history  History reviewed  No pertinent family history  Social History     Tobacco Use    Smoking status: Former Smoker     Packs/day: 0 50     Types: Cigarettes    Smokeless tobacco: Never Used    Tobacco comment: quit a week ago      Substance Use Topics    Alcohol use: Yes     Comment: occ    Drug use: Never           Allergies    Allergies   Allergen Reactions    Amoxicillin     Amoxicillin Hives       Home Medications    Prior to Admission medications    Medication Sig Start Date End Date Taking? Authorizing Provider   ibuprofen (MOTRIN) 600 mg tablet Take 600 mg by mouth every 6 (six) hours as needed for mild pain    Historical Provider, MD           Review of Systems    Review of Systems   Constitutional: Negative for chills and fever  HENT: Negative for rhinorrhea and sore throat  Respiratory: Negative for cough and shortness of breath  Cardiovascular: Negative for chest pain  Gastrointestinal: Negative for abdominal pain, nausea and vomiting  Musculoskeletal: Negative for back pain and myalgias  Neurological: Negative for headaches  Physical Exam      ED Triage Vitals [06/02/21 1711]   Temperature Pulse Respirations Blood Pressure SpO2   98 °F (36 7 °C) 61 18 155/82 99 %      Temp Source Heart Rate Source Patient Position - Orthostatic VS BP Location FiO2 (%)   Oral Monitor Lying Right arm --      Pain Score       --               Physical Exam  Constitutional:       General: He is not in acute distress  Appearance: He is not ill-appearing  HENT:      Head: Normocephalic and atraumatic  Nose: Nose normal    Eyes:      Extraocular Movements: Extraocular movements intact  Pupils: Pupils are equal, round, and reactive to light  Neck:      Musculoskeletal: Normal range of motion and neck supple  Cardiovascular:      Rate and Rhythm: Normal rate and regular rhythm  Pulmonary:      Effort: Pulmonary effort is normal  No respiratory distress  Abdominal:      General: There is no distension  Tenderness: There is no abdominal tenderness  Musculoskeletal: Normal range of motion  General: No swelling or deformity  Skin:     General: Skin is warm  Findings: No erythema     Neurological:      Mental Status: He is alert and oriented to person, place, and time  Mental status is at baseline  Diagnostic Results      Labs:    Results Reviewed     None          All labs reviewed and utilized in the medical decision making process    Radiology:    No orders to display       All radiology studies independently viewed by me and interpreted by the radiologist     Procedure    Procedures              Medications - No data to display        FINAL IMPRESSION    Final diagnoses:   Encounter to obtain excuse from work         DISPOSITION    Time reflects when diagnosis was documented in both MDM as applicable and the Disposition within this note     Time User Action Codes Description Comment    6/2/2021  5:32 PM Autumn Montoya Add [Z02 89] Encounter to obtain excuse from work       ED Disposition     ED Disposition Condition Date/Time Comment    Discharge Stable Wed Jun 2, 2021  5:31 PM Chava Cates discharge to home/self care  Follow-up Information     Follow up With Specialties Details Why Contact Info    Mai Galicia MD Family Medicine  As needed [address] [place] [address]  [address] [place] [address]              PATIENT REFERRED TO:    Mai Galicia MD  [address]  [address] [facility] [address]  [phone]      As needed      DISCHARGE MEDICATIONS:    Patient's Medications   Discharge Prescriptions    No medications on file       No discharge procedures on file  Kaitlin Oakes DO        This note was partially completed using voice recognition technology, and was scanned for gross errors; however some errors may still exist  Please contact the author with any questions or requests for clarification        Kaitlin Oakes DO  06/02/21 8049

## 2021-06-02 NOTE — Clinical Note
Shu Saunders was seen and treated in our emergency department on 6/2/2021  Diagnosis:      Abel Abel     He may return on this date:      Patient had COVID-19 symptoms from 4/18/2021 to 5/01/2021 including cough per his PCP's work note  He has been cleared to return to work by his PCP  If you have any questions or concerns, please don't hesitate to call        Delta Presume, DO    ______________________________           _______________          _______________  Hospital Representative                              Date                                Time

## 2021-06-26 ENCOUNTER — HOSPITAL ENCOUNTER (EMERGENCY)
Facility: HOSPITAL | Age: 26
Discharge: HOME | End: 2021-06-26
Attending: EMERGENCY MEDICINE | Admitting: EMERGENCY MEDICINE
Payer: COMMERCIAL

## 2021-06-26 VITALS
OXYGEN SATURATION: 100 % | SYSTOLIC BLOOD PRESSURE: 123 MMHG | TEMPERATURE: 98.2 F | DIASTOLIC BLOOD PRESSURE: 60 MMHG | RESPIRATION RATE: 18 BRPM | HEART RATE: 54 BPM

## 2021-06-26 DIAGNOSIS — L73.9 FOLLICULITIS: Primary | ICD-10-CM

## 2021-06-26 PROCEDURE — 99284 EMERGENCY DEPT VISIT MOD MDM: CPT | Performed by: EMERGENCY MEDICINE

## 2021-06-26 PROCEDURE — 99283 EMERGENCY DEPT VISIT LOW MDM: CPT

## 2021-06-26 RX ORDER — DOXYCYCLINE HYCLATE 100 MG
100 TABLET ORAL 2 TIMES DAILY
Qty: 20 TABLET | Refills: 0 | Status: SHIPPED | OUTPATIENT
Start: 2021-06-26 | End: 2021-07-06

## 2021-06-26 NOTE — ED PROVIDER NOTES
History  Chief Complaint   Patient presents with    Penis Pain     pt states to have painful lump on penis  pt states "i tried to squeeze it and nothing came out"      HPI  Patient is a 69-year-old male, he reports over last 2 days he has noticed a area of induration and circular redness on his penis  He describes a a circular area along the shaft on the left side of his penis that has become red and indurated attempted to squeeze it but no pus came out  Patient reports  Painful  Denies any other lesions  Denies any discharge or dysuria  Past medical history of anxiety   Family history noncontributory   Social history, former smoker no history of drug abuse  Prior to Admission Medications   Prescriptions Last Dose Informant Patient Reported? Taking?   ibuprofen (MOTRIN) 600 mg tablet  Self Yes No   Sig: Take 600 mg by mouth every 6 (six) hours as needed for mild pain      Facility-Administered Medications: None       Past Medical History:   Diagnosis Date    Psychiatric disorder     anxiety       History reviewed  No pertinent surgical history  History reviewed  No pertinent family history  I have reviewed and agree with the history as documented  E-Cigarette/Vaping    E-Cigarette Use Never User      E-Cigarette/Vaping Substances    Nicotine No     THC No     CBD No     Flavoring No     Other No     Unknown No      Social History     Tobacco Use    Smoking status: Former Smoker     Packs/day: 0 50     Types: Cigarettes    Smokeless tobacco: Never Used    Tobacco comment: quit a week ago  Vaping Use    Vaping Use: Never used   Substance Use Topics    Alcohol use: Yes     Comment: occ    Drug use: Never       Review of Systems   Constitutional: Negative for fever  HENT: Negative for congestion  Eyes: Negative for pain and redness  Respiratory: Negative for cough and shortness of breath  Cardiovascular: Negative for chest pain     Gastrointestinal: Negative for abdominal pain and vomiting  Skin: Positive for rash  Penile lesion    Physical Exam  Physical Exam  Vitals and nursing note reviewed  Constitutional:       Appearance: He is well-developed  HENT:      Head: Normocephalic  Right Ear: External ear normal       Left Ear: External ear normal       Nose: Nose normal    Eyes:      General: Lids are normal       Pupils: Pupils are equal, round, and reactive to light  Pulmonary:      Effort: Pulmonary effort is normal  No respiratory distress  Genitourinary:     Comments: There is no discharge, there is no swelling or redness of his testicles, there is a small circular pimple like lesion along the lateral left shaft of the patient's penis there is no abscess  This appears to be an infected hair follicle  It did not communicate with the tip of the penis it does not look like a lesions of syphilis  Musculoskeletal:         General: No deformity  Normal range of motion  Cervical back: Normal range of motion and neck supple  Skin:     General: Skin is warm and dry  Neurological:      Mental Status: He is alert and oriented to person, place, and time  Vital Signs  ED Triage Vitals [06/26/21 1826]   Temperature Pulse Respirations Blood Pressure SpO2   98 2 °F (36 8 °C) (!) 54 18 123/60 100 %      Temp Source Heart Rate Source Patient Position - Orthostatic VS BP Location FiO2 (%)   Oral Monitor Sitting Left arm --      Pain Score       --           Vitals:    06/26/21 1826   BP: 123/60   Pulse: (!) 54   Patient Position - Orthostatic VS: Sitting         Visual Acuity      ED Medications  Medications - No data to display    Diagnostic Studies  Results Reviewed     None                 No orders to display              Procedures  Procedures         ED Course                             SBIRT 20yo+      Most Recent Value   SBIRT (22 yo +)   In order to provide better care to our patients, we are screening all of our patients for alcohol and drug use   Would it be okay to ask you these screening questions? Yes Filed at: 06/26/2021 3451   Initial Alcohol Screen: US AUDIT-C    1  How often do you have a drink containing alcohol?  0 Filed at: 06/26/2021 1834   2  How many drinks containing alcohol do you have on a typical day you are drinking? 0 Filed at: 06/26/2021 1834   3a  Male UNDER 65: How often do you have five or more drinks on one occasion? 0 Filed at: 06/26/2021 1834   3b  FEMALE Any Age, or MALE 65+: How often do you have 4 or more drinks on one occassion? 0 Filed at: 06/26/2021 1834   Audit-C Score  0 Filed at: 06/26/2021 3756   JAMES: How many times in the past year have you    Used an illegal drug or used a prescription medication for non-medical reasons? Never Filed at: 06/26/2021 1834                    MDM  Medical decision making 42-year-old male presents with a small lesion along the left side of his pain is consistent with a full infected hair follicle, discussed antibiotics discussed topical agents, discussed follow-up discussed indications to return  Disposition  Final diagnoses: Folliculitis - Penis     Time reflects when diagnosis was documented in both MDM as applicable and the Disposition within this note     Time User Action Codes Description Comment    6/26/2021  6:37 PM Rozetta Moder Add [I51 5] Folliculitis     7/13/0323  6:37 PM Rozetta Moder Modify [Y93 8] Folliculitis Penis      ED Disposition     ED Disposition Condition Date/Time Comment    Discharge Stable Sat Jun 26, 2021  6:37 PM Cj Rincon discharge to home/self care              Follow-up Information     Follow up With Specialties Details Why [address] [facility], [facility]  [address] [address]  [phone]            Discharge Medication List as of 6/26/2021  6:39 PM      START taking these medications    Details   doxycycline hyclate (VIBRA-TABS) 100 mg tablet Take 1 tablet (100 mg total) by mouth 2 (two) times a day for 10 days, Starting Sat 6/26/2021, Until Tue 7/6/2021, Normal      mupirocin (BACTROBAN) 2 % ointment Apply topically 3 (three) times a day, Starting Sat 6/26/2021, Normal         CONTINUE these medications which have NOT CHANGED    Details   ibuprofen (MOTRIN) 600 mg tablet Take 600 mg by mouth every 6 (six) hours as needed for mild pain, Historical Med           No discharge procedures on file      PDMP Review     None          ED Provider  Electronically Signed by           Huang Balderas MD  06/27/21 4275

## 2021-07-30 ENCOUNTER — HOSPITAL ENCOUNTER (EMERGENCY)
Facility: HOSPITAL | Age: 26
Discharge: HOME | End: 2021-07-30
Attending: EMERGENCY MEDICINE
Payer: MEDICARE

## 2021-07-30 VITALS
SYSTOLIC BLOOD PRESSURE: 116 MMHG | HEIGHT: 66 IN | HEART RATE: 54 BPM | TEMPERATURE: 97.8 F | RESPIRATION RATE: 18 BRPM | WEIGHT: 130 LBS | OXYGEN SATURATION: 100 % | DIASTOLIC BLOOD PRESSURE: 61 MMHG | BODY MASS INDEX: 20.89 KG/M2

## 2021-07-30 DIAGNOSIS — M25.561 ACUTE PAIN OF RIGHT KNEE: Primary | ICD-10-CM

## 2021-07-30 PROCEDURE — 99283 EMERGENCY DEPT VISIT LOW MDM: CPT

## 2021-07-30 PROCEDURE — 99284 EMERGENCY DEPT VISIT MOD MDM: CPT | Performed by: EMERGENCY MEDICINE

## 2021-07-30 RX ORDER — MELOXICAM 7.5 MG/1
7.5 TABLET ORAL DAILY
Qty: 15 TABLET | Refills: 0 | OUTPATIENT
Start: 2021-07-30 | End: 2022-02-12

## 2021-07-30 NOTE — ED PROVIDER NOTES
Pt Name: Mili Hernandez  MRN: [id number]  [place] 1995  Age/Sex: 22 y o  male  Date of evaluation: 7/30/2021  PCP: Fartun Perez MD    [address] [place] [address]    Chief Complaint   Patient presents with    Knee Pain     Patient states that he 'twisted his knee when playing with his daughter yesterday'         HPI    22 y o  male presenting with right knee pain  Patient states that he twisted his knee when playing with his daughter yesterday, states he is still able walk on it but has increased pain with doing so  The pain is dull, moderate intensity, in the right leg just above the knee, radiating to the knee, worse with walking and better at rest   Patient states that he went to work today, works in a warehouse lifting heavy boxes, states he had increased pain lifting the boxes  He denies fever, nausea, vomiting diarrhea, numbness, weakness, other symptoms, other pain or injuries  HPI      Past Medical and Surgical History    Past Medical History:   Diagnosis Date    Psychiatric disorder     anxiety       History reviewed  No pertinent surgical history  History reviewed  No pertinent family history  Social History     Tobacco Use    Smoking status: Current Some Day Smoker     Packs/day: 0 50     Types: Cigarettes    Smokeless tobacco: Never Used    Tobacco comment: quit a week ago  Vaping Use    Vaping Use: Never used   Substance Use Topics    Alcohol use: Yes     Comment: occ    Drug use: Never           Allergies    Allergies   Allergen Reactions    Amoxicillin     Amoxicillin Hives       Home Medications    Prior to Admission medications    Medication Sig Start Date End Date Taking?  Authorizing Provider   ibuprofen (MOTRIN) 600 mg tablet Take 600 mg by mouth every 6 (six) hours as needed for mild pain    Historical Provider, MD   mupirocin (BACTROBAN) 2 % ointment Apply topically 3 (three) times a day 6/26/21   Conrado Medina MD           Review of Systems    Review of Systems Constitutional: Negative for appetite change, chills and diaphoresis  HENT: Negative for drooling, facial swelling, trouble swallowing and voice change  Respiratory: Negative for apnea, shortness of breath and wheezing  Cardiovascular: Negative for chest pain and leg swelling  Gastrointestinal: Negative for abdominal distention, abdominal pain, diarrhea, nausea and vomiting  Genitourinary: Negative for dysuria and urgency  Musculoskeletal: Positive for arthralgias  Negative for back pain, gait problem and neck pain  Skin: Negative for color change, rash and wound  Neurological: Negative for seizures, speech difficulty, weakness and headaches  Psychiatric/Behavioral: Negative for agitation, behavioral problems and dysphoric mood  The patient is not nervous/anxious  All other systems reviewed and negative  Physical Exam      ED Triage Vitals [07/30/21 0835]   Temperature Pulse Respirations Blood Pressure SpO2   97 8 °F (36 6 °C) (!) 54 18 116/61 100 %      Temp Source Heart Rate Source Patient Position - Orthostatic VS BP Location FiO2 (%)   Oral Monitor Lying Right arm --      Pain Score       --               Physical Exam  Vitals and nursing note reviewed  Constitutional:       Appearance: He is well-developed  HENT:      Head: Normocephalic and atraumatic  Right Ear: External ear normal       Left Ear: External ear normal    Eyes:      Conjunctiva/sclera: Conjunctivae normal       Pupils: Pupils are equal, round, and reactive to light  Neck:      Trachea: No tracheal deviation  Cardiovascular:      Rate and Rhythm: Normal rate and regular rhythm  Heart sounds: Normal heart sounds  No murmur heard  Pulmonary:      Effort: Pulmonary effort is normal  No respiratory distress  Breath sounds: Normal breath sounds  No stridor  No wheezing or rales  Abdominal:      General: There is no distension  Musculoskeletal:         General: Tenderness present   No deformity  Normal range of motion  Cervical back: Normal range of motion and neck supple  Comments: Patient tender to palpation just above the knee on the anterior surface, overlying the quadriceps muscles  Also mildly tender to palpation to the upper knee but not overlying the patella or any other bony surface  No pain or instability with varus or valgus stress or with anterior drawer  Strength sensation pulse and cap refill intact distal   No skin findings  Skin:     General: Skin is warm and dry  Findings: No rash  Neurological:      Mental Status: He is alert and oriented to person, place, and time  Psychiatric:         Behavior: Behavior normal          Thought Content: Thought content normal          Judgment: Judgment normal               Diagnostic Results      Labs:    Results Reviewed     None          All labs reviewed and utilized in the medical decision making process    Radiology:    No orders to display       All radiology studies independently viewed by me and interpreted by the radiologist     Procedure    Procedures        ED Course of Care and Re-Assessments      X-ray deferred based on [place] knee rules  Medications - No data to display        FINAL IMPRESSION    Final diagnoses:   Acute pain of right knee         DISPOSITION/PLAN    Acute pain of the right knee and leg as above  Very low suspicion for unstable fracture dislocation, compartment syndrome, septic arthritis or other infectious etiology, critical neurovascular disruption, other acute threat to life or limb  Overall felt most suspicious for quadriceps strain  Referred to Sports Medicine, discharged strict return precautions, follow up primary care doctor and sports medicine    Time reflects when diagnosis was documented in both MDM as applicable and the Disposition within this note     Time User Action Codes Description Comment    7/30/2021  9:19 AM Oneida IGLESIAS Add [F80 498] Acute pain of right knee     7/30/2021  9:19 AM Maraster Stiven IGLESIAS Modify [K63 719] Acute pain of right knee       ED Disposition     ED Disposition Condition Date/Time Comment    Discharge Stable Fri Jul 30, 2021  9:19 AM Merlene Ibarra discharge to home/self care  Follow-up Information     Follow up With Specialties Details Why Contact Info Additional [address] [facility] Emergency Department Emergency Medicine Go to  If symptoms worsen [address] [address]  [address] [facility] Emergency Department, [address] [place] , [address] Tan Peters MD Family Medicine Call  As needed [address] [facility] [address]       [employer], [address] Call today To schedule close follow-up for your knee injury  [address] [facility]  [address]  [facility] [address]  [phone]               PATIENT REFERRED TO:    [address] [facility] Emergency Department  [address] [address] [phone]  Go to   If symptoms worsen    Rizwan Roberts MD  [address]  [address] [facility] 87  [phone]    Call   As needed    [employer], DO  [address]  [facility] [phone]    Call today  To schedule close follow-up for your knee injury  DISCHARGE MEDICATIONS:    Discharge Medication List as of 7/30/2021  9:20 AM      START taking these medications    Details   meloxicam (MOBIC) 7 5 mg tablet Take 1 tablet (7 5 mg total) by mouth daily, Starting Fri 7/30/2021, Print         CONTINUE these medications which have NOT CHANGED    Details   mupirocin (BACTROBAN) 2 % ointment Apply topically 3 (three) times a day, Starting Sat 6/26/2021, Normal         STOP taking these medications       ibuprofen (MOTRIN) 600 mg tablet Comments:   Reason for Stopping:               No discharge procedures on file           Elis Aldrich MD Abe Rojo MD  07/30/21 9797

## 2021-07-30 NOTE — Clinical Note
Juan Francisco Nagy was seen and treated in our emergency department on 7/30/2021  Diagnosis: Knee pain, quadriceps sprain suspected    Prince Trinidad  may return to work on return date  He may return on this date: 08/02/2021         If you have any questions or concerns, please don't hesitate to call        Mercie Bernheim, MD    ______________________________           _______________          _______________  Hospital Representative                              Date                                Time

## 2021-08-16 ENCOUNTER — HOSPITAL ENCOUNTER (EMERGENCY)
Facility: HOSPITAL | Age: 26
Discharge: HOME | End: 2021-08-16
Attending: EMERGENCY MEDICINE | Admitting: EMERGENCY MEDICINE
Payer: MEDICARE

## 2021-08-16 VITALS
SYSTOLIC BLOOD PRESSURE: 113 MMHG | BODY MASS INDEX: 20.89 KG/M2 | HEART RATE: 63 BPM | WEIGHT: 130 LBS | RESPIRATION RATE: 18 BRPM | OXYGEN SATURATION: 97 % | DIASTOLIC BLOOD PRESSURE: 62 MMHG | HEIGHT: 66 IN | TEMPERATURE: 98.2 F

## 2021-08-16 DIAGNOSIS — J06.9 UPPER RESPIRATORY INFECTION: Primary | ICD-10-CM

## 2021-08-16 LAB — SARS-COV-2 RNA RESP QL NAA+PROBE: NEGATIVE

## 2021-08-16 PROCEDURE — U0005 INFEC AGEN DETEC AMPLI PROBE: HCPCS | Performed by: PHYSICIAN ASSISTANT

## 2021-08-16 PROCEDURE — U0003 INFECTIOUS AGENT DETECTION BY NUCLEIC ACID (DNA OR RNA); SEVERE ACUTE RESPIRATORY SYNDROME CORONAVIRUS 2 (SARS-COV-2) (CORONAVIRUS DISEASE [COVID-19]), AMPLIFIED PROBE TECHNIQUE, MAKING USE OF HIGH THROUGHPUT TECHNOLOGIES AS DESCRIBED BY CMS-2020-01-R: HCPCS | Performed by: PHYSICIAN ASSISTANT

## 2021-08-16 PROCEDURE — 99283 EMERGENCY DEPT VISIT LOW MDM: CPT

## 2021-08-16 PROCEDURE — 99284 EMERGENCY DEPT VISIT MOD MDM: CPT | Performed by: PHYSICIAN ASSISTANT

## 2021-08-17 NOTE — ED PROVIDER NOTES
History  Chief Complaint   Patient presents with    Earache     Pt reports right ear pain since friday  Patient is a 51-year-old male that presents emergency department complaints of right ear pain  Patient states that he began having right ear pain and congestion on Friday  He states he was seen in urgent care and started on azithromycin  He states he has been taking medication with no relief  He denies any fever, chills, chest pain, shortness breath, nausea, vomiting, diarrhea  Prior to Admission Medications   Prescriptions Last Dose Informant Patient Reported? Taking?   meloxicam (MOBIC) 7 5 mg tablet   No No   Sig: Take 1 tablet (7 5 mg total) by mouth daily   mupirocin (BACTROBAN) 2 % ointment   No No   Sig: Apply topically 3 (three) times a day      Facility-Administered Medications: None       Past Medical History:   Diagnosis Date    Psychiatric disorder     anxiety       History reviewed  No pertinent surgical history  History reviewed  No pertinent family history  I have reviewed and agree with the history as documented  E-Cigarette/Vaping    E-Cigarette Use Never User      E-Cigarette/Vaping Substances    Nicotine No     THC No     CBD No     Flavoring No     Other No     Unknown No      Social History     Tobacco Use    Smoking status: Current Some Day Smoker     Packs/day: 0 50     Types: Cigarettes    Smokeless tobacco: Never Used    Tobacco comment: quit a week ago  Vaping Use    Vaping Use: Never used   Substance Use Topics    Alcohol use: Yes     Comment: occ    Drug use: Never       Review of Systems   HENT: Positive for congestion and ear pain  Respiratory: Negative for shortness of breath  Cardiovascular: Negative for chest pain  Gastrointestinal: Negative for abdominal pain  All other systems reviewed and are negative  Physical Exam  Physical Exam  Vitals reviewed  Constitutional:       Appearance: Normal appearance     HENT:      Head: Normocephalic and atraumatic  Right Ear: A middle ear effusion is present  Left Ear: Tympanic membrane and external ear normal       Nose: Nose normal       Mouth/Throat:      Mouth: Mucous membranes are moist    Eyes:      Extraocular Movements: Extraocular movements intact  Conjunctiva/sclera: Conjunctivae normal       Pupils: Pupils are equal, round, and reactive to light  Cardiovascular:      Rate and Rhythm: Normal rate and regular rhythm  Pulses: Normal pulses  Heart sounds: Normal heart sounds  Pulmonary:      Effort: Pulmonary effort is normal       Breath sounds: Normal breath sounds  Abdominal:      General: Bowel sounds are normal       Palpations: Abdomen is soft  Lymphadenopathy:      Cervical: No cervical adenopathy  Skin:     General: Skin is warm  Capillary Refill: Capillary refill takes less than 2 seconds  Neurological:      General: No focal deficit present  Mental Status: He is alert  Psychiatric:         Mood and Affect: Mood normal          Behavior: Behavior normal          Thought Content: Thought content normal          Judgment: Judgment normal          Vital Signs  ED Triage Vitals [08/16/21 1958]   Temperature Pulse Respirations Blood Pressure SpO2   98 2 °F (36 8 °C) 71 18 108/65 98 %      Temp Source Heart Rate Source Patient Position - Orthostatic VS BP Location FiO2 (%)   Oral Monitor Sitting Left arm --      Pain Score       --           Vitals:    08/16/21 1958 08/16/21 2059   BP: 108/65 113/62   Pulse: 71 63   Patient Position - Orthostatic VS: Sitting          Visual Acuity      ED Medications  Medications - No data to display    Diagnostic Studies  Results Reviewed     Procedure Component Value Units Date/Time    Novel Coronavirus Dee Albarran [facility] [[id number]]  (Normal) Collected: 08/16/21 2056    Lab Status: Final result Specimen: Nares from Nose Updated: 08/16/21 2203     SARS-CoV-2 Negative    Narrative:       The specimen collection materials, transport medium, and/or testing methodology utilized in the production of these test results have been proven to be reliable in a limited validation with an abbreviated program under the Emergency Utilization Authorization provided by the FDA  Testing reported as "Presumptive positive" will be confirmed with secondary testing to ensure result accuracy  Clinical caution and judgement should be used with the interpretation of these results with consideration of the clinical impression and other laboratory testing  Testing reported as "Positive" or "Negative" has been proven to be accurate according to standard laboratory validation requirements  All testing is performed with control materials showing appropriate reactivity at standard intervals  No orders to display              Procedures  Procedures         ED Course                             SBIRT 20yo+      Most Recent Value   SBIRT (22 yo +)   In order to provide better care to our patients, we are screening all of our patients for alcohol and drug use  Would it be okay to ask you these screening questions? Yes Filed at: 08/16/2021 2055   Initial Alcohol Screen: US AUDIT-C    1  How often do you have a drink containing alcohol?  0 Filed at: 08/16/2021 2055   2  How many drinks containing alcohol do you have on a typical day you are drinking? 0 Filed at: 08/16/2021 2055   3a  Male UNDER 65: How often do you have five or more drinks on one occasion? 0 Filed at: 08/16/2021 2055   Audit-C Score  0 Filed at: 08/16/2021 2055   JAMES: How many times in the past year have you    Used an illegal drug or used a prescription medication for non-medical reasons? Never Filed at: 08/16/2021 2055                    MDM  Number of Diagnoses or Management Options  Upper respiratory infection  Diagnosis management comments: Patient is a 66-year-old male that presents emergency department complaints of right ear pain    Patient states that he began having right ear pain and congestion on Friday  He states he was seen in urgent care and started on azithromycin  He states he has been taking medication with no relief  He denies any fever, chills, chest pain, shortness breath, nausea, vomiting, diarrhea  On examination, patient has an effusion noted in the right ear  Remainder of exam is unremarkable  Findings discussed with patient  He is concerned he possibly is COVID  Patient be tested for COVID in contact with results  I recommended he continue to use decongestant to help relieve his congestion in his nose and ears  At this point I would not recommend any other antibiotics  Return parameters were discussed  Patient stable for discharge  Amount and/or Complexity of Data Reviewed  Clinical lab tests: ordered and reviewed  Tests in the radiology section of CPT®: ordered    Risk of Complications, Morbidity, and/or Mortality  Presenting problems: low  Diagnostic procedures: low  Management options: low    Patient Progress  Patient progress: stable      Disposition  Final diagnoses:   Upper respiratory infection     Time reflects when diagnosis was documented in both MDM as applicable and the Disposition within this note     Time User Action Codes Description Comment    8/16/2021  8:46 PM Mason Babb [J06 9] Upper respiratory infection       ED Disposition     ED Disposition Condition Date/Time Comment    Discharge Stable Mon Aug 16, 2021 2046 Suhail Stands discharge to home/self care              Follow-up Information     Follow up With Specialties Details Why Contact Info    Coretta Rg MD Family Medicine Schedule an appointment as soon as possible for a visit   [address]  [address] [facility] [address]  [phone]            Discharge Medication List as of 8/16/2021  8:46 PM      CONTINUE these medications which have NOT CHANGED    Details   meloxicam (MOBIC) 7 5 mg tablet Take 1 tablet (7 5 mg total) by mouth daily, Starting Fri 7/30/2021, Print      mupirocin (BACTROBAN) 2 % ointment Apply topically 3 (three) times a day, Starting Sat 6/26/2021, Normal           No discharge procedures on file      PDMP Review     None          ED Provider  Electronically Signed by           Nain Negrete PA-C  08/17/21 5966

## 2021-09-22 PROCEDURE — 99284 EMERGENCY DEPT VISIT MOD MDM: CPT

## 2021-09-22 PROCEDURE — 99284 EMERGENCY DEPT VISIT MOD MDM: CPT | Performed by: EMERGENCY MEDICINE

## 2021-09-23 ENCOUNTER — HOSPITAL ENCOUNTER (EMERGENCY)
Facility: HOSPITAL | Age: 26
Discharge: HOME | End: 2021-09-23
Attending: EMERGENCY MEDICINE | Admitting: EMERGENCY MEDICINE
Payer: MEDICARE

## 2021-09-23 ENCOUNTER — APPOINTMENT (EMERGENCY)
Dept: ULTRASOUND IMAGING | Facility: HOSPITAL | Age: 26
End: 2021-09-23
Payer: MEDICARE

## 2021-09-23 VITALS
TEMPERATURE: 98.5 F | RESPIRATION RATE: 16 BRPM | OXYGEN SATURATION: 97 % | HEART RATE: 61 BPM | DIASTOLIC BLOOD PRESSURE: 60 MMHG | SYSTOLIC BLOOD PRESSURE: 107 MMHG

## 2021-09-23 DIAGNOSIS — N50.811 TESTICULAR PAIN, RIGHT: Primary | ICD-10-CM

## 2021-09-23 DIAGNOSIS — N43.3 HYDROCELE: ICD-10-CM

## 2021-09-23 LAB
BILIRUB UR QL STRIP: NEGATIVE
CLARITY UR: CLEAR
COLOR UR: YELLOW
GLUCOSE UR STRIP-MCNC: NEGATIVE MG/DL
HGB UR QL STRIP.AUTO: NEGATIVE
KETONES UR STRIP-MCNC: NEGATIVE MG/DL
LEUKOCYTE ESTERASE UR QL STRIP: NEGATIVE
NITRITE UR QL STRIP: NEGATIVE
PH UR STRIP.AUTO: 6 [PH]
PROT UR STRIP-MCNC: NEGATIVE MG/DL
SP GR UR STRIP.AUTO: 1.02 (ref 1–1.03)
UROBILINOGEN UR QL STRIP.AUTO: 0.2 E.U./DL

## 2021-09-23 PROCEDURE — 81003 URINALYSIS AUTO W/O SCOPE: CPT | Performed by: EMERGENCY MEDICINE

## 2021-09-23 PROCEDURE — 87591 N.GONORRHOEAE DNA AMP PROB: CPT | Performed by: EMERGENCY MEDICINE

## 2021-09-23 PROCEDURE — 87491 CHLMYD TRACH DNA AMP PROBE: CPT | Performed by: EMERGENCY MEDICINE

## 2021-09-23 PROCEDURE — 76870 US EXAM SCROTUM: CPT

## 2021-09-23 RX ORDER — NAPROXEN 500 MG/1
500 TABLET ORAL 2 TIMES DAILY PRN
Qty: 20 TABLET | Refills: 0 | OUTPATIENT
Start: 2021-09-23 | End: 2022-02-12
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
## 2021-09-23 NOTE — ED NOTES
Patient walked out of department stable      Negrito Mixl, [address]  09/23/21 0294

## 2021-09-25 LAB
C TRACH DNA SPEC QL NAA+PROBE: NEGATIVE
N GONORRHOEA DNA SPEC QL NAA+PROBE: NEGATIVE

## 2021-11-04 ENCOUNTER — APPOINTMENT (EMERGENCY)
Dept: RADIOLOGY | Facility: HOSPITAL | Age: 26
End: 2021-11-04
Payer: MEDICARE

## 2021-11-04 ENCOUNTER — HOSPITAL ENCOUNTER (EMERGENCY)
Facility: HOSPITAL | Age: 26
Discharge: HOME | End: 2021-11-04
Attending: EMERGENCY MEDICINE
Payer: MEDICARE

## 2021-11-04 ENCOUNTER — APPOINTMENT (EMERGENCY)
Dept: CT IMAGING | Facility: HOSPITAL | Age: 26
End: 2021-11-04
Payer: MEDICARE

## 2021-11-04 VITALS
RESPIRATION RATE: 18 BRPM | OXYGEN SATURATION: 100 % | TEMPERATURE: 98 F | SYSTOLIC BLOOD PRESSURE: 125 MMHG | DIASTOLIC BLOOD PRESSURE: 60 MMHG | HEART RATE: 68 BPM

## 2021-11-04 DIAGNOSIS — K04.7 DENTAL INFECTION: ICD-10-CM

## 2021-11-04 DIAGNOSIS — K59.00 CONSTIPATION, UNSPECIFIED CONSTIPATION TYPE: Primary | ICD-10-CM

## 2021-11-04 LAB
ALBUMIN SERPL BCP-MCNC: 4.2 G/DL (ref 3.5–5)
ALP SERPL-CCNC: 121 U/L (ref 46–116)
ALT SERPL W P-5'-P-CCNC: 41 U/L (ref 12–78)
ANION GAP SERPL CALCULATED.3IONS-SCNC: 8 MMOL/L (ref 4–13)
AST SERPL W P-5'-P-CCNC: 23 U/L (ref 5–45)
BASOPHILS # BLD AUTO: 0.08 THOUSANDS/ΜL (ref 0–0.1)
BASOPHILS NFR BLD AUTO: 1 % (ref 0–1)
BILIRUB SERPL-MCNC: 0.21 MG/DL (ref 0.2–1)
BUN SERPL-MCNC: 8 MG/DL (ref 5–25)
CALCIUM SERPL-MCNC: 9.2 MG/DL (ref 8.3–10.1)
CHLORIDE SERPL-SCNC: 104 MMOL/L (ref 100–108)
CO2 SERPL-SCNC: 31 MMOL/L (ref 21–32)
CREAT SERPL-MCNC: 0.79 MG/DL (ref 0.6–1.3)
EOSINOPHIL # BLD AUTO: 0.57 THOUSAND/ΜL (ref 0–0.61)
EOSINOPHIL NFR BLD AUTO: 5 % (ref 0–6)
ERYTHROCYTE [DISTWIDTH] IN BLOOD BY AUTOMATED COUNT: 13.3 % (ref 11.6–15.1)
GFR SERPL CREATININE-BSD FRML MDRD: 125 ML/MIN/1.73SQ M
GLUCOSE SERPL-MCNC: 90 MG/DL (ref 65–140)
HCT VFR BLD AUTO: 49.1 % (ref 36.5–49.3)
HGB BLD-MCNC: 16.3 G/DL (ref 12–17)
IMM GRANULOCYTES # BLD AUTO: 0.05 THOUSAND/UL (ref 0–0.2)
IMM GRANULOCYTES NFR BLD AUTO: 0 % (ref 0–2)
LIPASE SERPL-CCNC: 144 U/L (ref 73–393)
LYMPHOCYTES # BLD AUTO: 4.57 THOUSANDS/ΜL (ref 0.6–4.47)
LYMPHOCYTES NFR BLD AUTO: 36 % (ref 14–44)
MCH RBC QN AUTO: 29.5 PG (ref 26.8–34.3)
MCHC RBC AUTO-ENTMCNC: 33.2 G/DL (ref 31.4–37.4)
MCV RBC AUTO: 89 FL (ref 82–98)
MONOCYTES # BLD AUTO: 0.72 THOUSAND/ΜL (ref 0.17–1.22)
MONOCYTES NFR BLD AUTO: 6 % (ref 4–12)
NEUTROPHILS # BLD AUTO: 6.81 THOUSANDS/ΜL (ref 1.85–7.62)
NEUTS SEG NFR BLD AUTO: 52 % (ref 43–75)
NRBC BLD AUTO-RTO: 0 /100 WBCS
PLATELET # BLD AUTO: 320 THOUSANDS/UL (ref 149–390)
PMV BLD AUTO: 10.2 FL (ref 8.9–12.7)
POTASSIUM SERPL-SCNC: 3.3 MMOL/L (ref 3.5–5.3)
PROT SERPL-MCNC: 7.5 G/DL (ref 6.4–8.2)
RBC # BLD AUTO: 5.52 MILLION/UL (ref 3.88–5.62)
SODIUM SERPL-SCNC: 143 MMOL/L (ref 136–145)
WBC # BLD AUTO: 12.8 THOUSAND/UL (ref 4.31–10.16)

## 2021-11-04 PROCEDURE — 99284 EMERGENCY DEPT VISIT MOD MDM: CPT | Performed by: EMERGENCY MEDICINE

## 2021-11-04 PROCEDURE — 96360 HYDRATION IV INFUSION INIT: CPT

## 2021-11-04 PROCEDURE — 83690 ASSAY OF LIPASE: CPT | Performed by: EMERGENCY MEDICINE

## 2021-11-04 PROCEDURE — 85025 COMPLETE CBC W/AUTO DIFF WBC: CPT | Performed by: EMERGENCY MEDICINE

## 2021-11-04 PROCEDURE — 74177 CT ABD & PELVIS W/CONTRAST: CPT

## 2021-11-04 PROCEDURE — 74022 RADEX COMPL AQT ABD SERIES: CPT

## 2021-11-04 PROCEDURE — 80053 COMPREHEN METABOLIC PANEL: CPT | Performed by: EMERGENCY MEDICINE

## 2021-11-04 PROCEDURE — 36415 COLL VENOUS BLD VENIPUNCTURE: CPT | Performed by: EMERGENCY MEDICINE

## 2021-11-04 PROCEDURE — 99284 EMERGENCY DEPT VISIT MOD MDM: CPT

## 2021-11-04 RX ORDER — CLINDAMYCIN HYDROCHLORIDE 300 MG/1
300 CAPSULE ORAL 3 TIMES DAILY
Qty: 21 CAPSULE | Refills: 0 | Status: SHIPPED | OUTPATIENT
Start: 2021-11-04 | End: 2021-11-11

## 2021-11-04 RX ORDER — MAGNESIUM CARB/ALUMINUM HYDROX 105-160MG
296 TABLET,CHEWABLE ORAL ONCE
Status: COMPLETED | OUTPATIENT
Start: 2021-11-04 | End: 2021-11-04

## 2021-11-04 RX ADMIN — SODIUM CHLORIDE 1000 ML: 0.9 INJECTION, SOLUTION INTRAVENOUS at 05:32

## 2021-11-04 RX ADMIN — MAGNESIUM CITRATE 296 ML: 1.75 LIQUID ORAL at 05:35

## 2021-11-04 RX ADMIN — IOHEXOL 100 ML: 350 INJECTION, SOLUTION INTRAVENOUS at 06:25

## 2021-11-10 ENCOUNTER — OCCMED (OUTPATIENT)
Dept: URGENT CARE | Facility: CLINIC | Age: 26
End: 2021-11-10
Payer: OTHER MISCELLANEOUS

## 2021-11-10 DIAGNOSIS — M54.50 ACUTE MIDLINE LOW BACK PAIN WITHOUT SCIATICA: Primary | ICD-10-CM

## 2021-11-10 PROCEDURE — 99283 EMERGENCY DEPT VISIT LOW MDM: CPT | Performed by: PHYSICIAN ASSISTANT

## 2021-11-10 PROCEDURE — G0382 LEV 3 HOSP TYPE B ED VISIT: HCPCS | Performed by: PHYSICIAN ASSISTANT

## 2021-12-29 ENCOUNTER — HOSPITAL ENCOUNTER (EMERGENCY)
Facility: HOSPITAL | Age: 26
Discharge: HOME | End: 2021-12-29
Attending: EMERGENCY MEDICINE | Admitting: EMERGENCY MEDICINE
Payer: MEDICARE

## 2021-12-29 VITALS
WEIGHT: 126.98 LBS | SYSTOLIC BLOOD PRESSURE: 126 MMHG | RESPIRATION RATE: 18 BRPM | HEIGHT: 66 IN | OXYGEN SATURATION: 98 % | HEART RATE: 67 BPM | DIASTOLIC BLOOD PRESSURE: 67 MMHG | BODY MASS INDEX: 20.41 KG/M2 | TEMPERATURE: 98.3 F

## 2021-12-29 DIAGNOSIS — K08.89 PAIN, DENTAL: Primary | ICD-10-CM

## 2021-12-29 PROCEDURE — 99284 EMERGENCY DEPT VISIT MOD MDM: CPT

## 2021-12-29 PROCEDURE — 96372 THER/PROPH/DIAG INJ SC/IM: CPT

## 2021-12-29 PROCEDURE — 99283 EMERGENCY DEPT VISIT LOW MDM: CPT

## 2021-12-29 RX ORDER — KETOROLAC TROMETHAMINE 30 MG/ML
15 INJECTION, SOLUTION INTRAMUSCULAR; INTRAVENOUS ONCE
Status: COMPLETED | OUTPATIENT
Start: 2021-12-29 | End: 2021-12-29

## 2021-12-29 RX ADMIN — KETOROLAC TROMETHAMINE 15 MG: 30 INJECTION, SOLUTION INTRAMUSCULAR at 03:02

## 2021-12-29 NOTE — DISCHARGE INSTRUCTIONS
Please return to ED for any concerns as outlined in the AVS or for any other concerns  Please follow-up with dentistry at the contact numbers provided for re-evaluation and further management  Continue antibiotics as prescribed

## 2021-12-29 NOTE — ED PROVIDER NOTES
History  Chief Complaint   Patient presents with    Dental Pain     Patient came in from home due to a "tooth infection"  Patient presents w/ a cracked tooth that has been causing him pain that is radiating up his face  Onset x1 wk ago  He stated that this happened to him 2 years ago to the same tooth  Denies drainage  Dental Pain  Location:  Upper  Upper teeth location:  11/ITZEL cuspid  Quality:  Throbbing  Severity:  Moderate  Onset quality:  Sudden  Duration:  1 day  Timing:  Constant  Progression:  Worsening  Chronicity:  Recurrent  Context: dental caries, dental fracture and poor dentition    Relieved by:  Nothing  Worsened by:  Touching  Ineffective treatments:  NSAIDs  Associated symptoms: facial pain    Associated symptoms: no difficulty swallowing, no drooling, no facial swelling, no fever, no headaches, no neck pain and no trismus    Risk factors: lack of dental care and smoking        Prior to Admission Medications   Prescriptions Last Dose Informant Patient Reported? Taking?   meloxicam (MOBIC) 7 5 mg tablet   No No   Sig: Take 1 tablet (7 5 mg total) by mouth daily   mupirocin (BACTROBAN) 2 % ointment   No No   Sig: Apply topically 3 (three) times a day   naproxen (NAPROSYN) 500 mg tablet   No No   Sig: Take 1 tablet (500 mg total) by mouth 2 (two) times a day as needed for moderate pain for up to 20 doses      Facility-Administered Medications: None       Past Medical History:   Diagnosis Date    Psychiatric disorder     anxiety       History reviewed  No pertinent surgical history  History reviewed  No pertinent family history  I have reviewed and agree with the history as documented      E-Cigarette/Vaping    E-Cigarette Use Never User      E-Cigarette/Vaping Substances    Nicotine No     THC No     CBD No     Flavoring No     Other No     Unknown No      Social History     Tobacco Use    Smoking status: Current Some Day Smoker     Packs/day: 0 50     Types: Cigarettes    Smokeless tobacco: Never Used    Tobacco comment: quit a week ago  Vaping Use    Vaping Use: Never used   Substance Use Topics    Alcohol use: Yes     Comment: occ    Drug use: Never       Review of Systems   Constitutional: Negative for chills and fever  HENT: Positive for dental problem  Negative for drooling, ear pain, facial swelling and sore throat  Eyes: Negative for pain and visual disturbance  Respiratory: Negative for cough and shortness of breath  Cardiovascular: Negative for chest pain and palpitations  Gastrointestinal: Negative for abdominal pain and vomiting  Genitourinary: Negative for dysuria and hematuria  Musculoskeletal: Negative for arthralgias, back pain and neck pain  Skin: Negative for color change and rash  Neurological: Negative for dizziness, seizures, syncope and headaches  Psychiatric/Behavioral: Negative for confusion  All other systems reviewed and are negative  Physical Exam  Physical Exam  Vitals and nursing note reviewed  Constitutional:       General: He is not in acute distress  Appearance: He is well-developed  He is not ill-appearing  HENT:      Head: Normocephalic and atraumatic  No right periorbital erythema or left periorbital erythema  Jaw: There is normal jaw occlusion  Mouth/Throat:      Mouth: Mucous membranes are moist       Dentition: Abnormal dentition  Dental tenderness, gingival swelling and dental caries present  No dental abscesses  Pharynx: Oropharynx is clear  No pharyngeal swelling  Comments: Pt with multiple dental caries and significant dental decay throughout the mouth  Pt with pain over tooth 11, no tooth present  Mild gingival swelling, no abscess appreciated  No discharge or drainage  Eyes:      Conjunctiva/sclera: Conjunctivae normal    Cardiovascular:      Rate and Rhythm: Normal rate and regular rhythm  Heart sounds: No murmur heard        Pulmonary:      Effort: Pulmonary effort is normal  No respiratory distress  Breath sounds: Normal breath sounds  Abdominal:      Palpations: Abdomen is soft  Tenderness: There is no abdominal tenderness  Musculoskeletal:      Cervical back: Neck supple  Lymphadenopathy:      Cervical: No cervical adenopathy  Skin:     General: Skin is warm and dry  Neurological:      Mental Status: He is alert  Vital Signs  ED Triage Vitals   Temperature Pulse Respirations Blood Pressure SpO2   12/29/21 0010 12/29/21 0010 12/29/21 0010 12/29/21 0010 12/29/21 0010   98 3 °F (36 8 °C) 67 18 126/67 98 %      Temp Source Heart Rate Source Patient Position - Orthostatic VS BP Location FiO2 (%)   12/29/21 0010 12/29/21 0010 12/29/21 0010 12/29/21 0010 --   Oral Monitor Sitting Left arm       Pain Score       12/29/21 0302       5           Vitals:    12/29/21 0010   BP: 126/67   Pulse: 67   Patient Position - Orthostatic VS: Sitting         Visual Acuity      ED Medications  Medications   ketorolac (TORADOL) injection 15 mg (15 mg Intramuscular Given 12/29/21 0302)       Diagnostic Studies  Results Reviewed     None                 No orders to display              Procedures  Procedures         ED Course                               SBIRT 20yo+      Most Recent Value   SBIRT (25 yo +)    In order to provide better care to our patients, we are screening all of our patients for alcohol and drug use  Would it be okay to ask you these screening questions? No Filed at: 12/29/2021 0016   Initial Alcohol Screen: US AUDIT-C     1  How often do you have a drink containing alcohol? 0 Filed at: 12/29/2021 0016   2  How many drinks containing alcohol do you have on a typical day you are drinking? 0 Filed at: 12/29/2021 0016   3a  Male UNDER 65: How often do you have five or more drinks on one occasion? 0 Filed at: 12/29/2021 0016   3b  FEMALE Any Age, or MALE 65+: How often do you have 4 or more drinks on one occassion?  0 Filed at: 12/29/2021 0016   Audit-C Score 0 Filed at: 12/29/2021 0016   JAMES: How many times in the past year have you    Used an illegal drug or used a prescription medication for non-medical reasons? Never Filed at: 12/29/2021 0016   DAST-10: In the past 12 months       1  Have you used drugs other than those required for medical reasons? 0 Filed at: 12/29/2021 0016   2  Do you use more than one drug at a time? 0 Filed at: 12/29/2021 0016   3  Have you had medical problems as a result of your drug use (e g , memory loss, hepatitis, convulsions, bleeding, etc )? 0 Filed at: 12/29/2021 0016   4  Have you had "blackouts" or "flashbacks" as a result of drug use? YesNo 0 Filed at: 12/29/2021 0016   5  Do you ever feel bad or guilty about your drug use? 0 Filed at: 12/29/2021 0016   6  Does your spouse (or parent) ever complain about your involvement with drugs? 0 Filed at: 12/29/2021 0016   7  Have you neglected your family because of your use of drugs? 0 Filed at: 12/29/2021 0016   8  Have you engaged in illegal activities in order to obtain drugs? 0 Filed at: 12/29/2021 0016   9  Have you ever experienced withdrawal symptoms (felt sick) when you stopped taking drugs? 0 Filed at: 12/29/2021 0016   10  Are you always able to stop using drugs when you want to? 0 Filed at: 12/29/2021 0016   DAST-10 Score 0 Filed at: 12/29/2021 0016                    MDM  Number of Diagnoses or Management Options  Risk of Complications, Morbidity, and/or Mortality  General comments: Pt reports he was rx'd clindamycin for a recent sinus infection for which he did not take at that time  Reports it is a rx for 5 days  He is currently on day 3, as such will hold new abx rx  Advised pt to continue abx  Pt reports he also has a rx for naproxen a home  Dental clinic information given to pt in AVS    Verbally communicated the importance of f/u with a dentist for re-evaluation and further management     Strict return precautions verbally communicated to the patient as outlined in the AVS   All patient questions and concerns were answered  Patient verbally communicated their understanding and agreement to the above plan  Patient Progress  Patient progress: stable      Disposition  Final diagnoses:   Pain, dental     Time reflects when diagnosis was documented in both MDM as applicable and the Disposition within this note     Time User Action Codes Description Comment    12/29/2021  2:45 AM Jomar Babb [K08 89] Pain, dental       ED Disposition     ED Disposition Condition Date/Time Comment    Discharge Stable Wed Dec 29, 2021  2:45 AM Sydney Henderson discharge to home/self care  Follow-up Information     Follow up With Specialties Details Why [address] [facility]  Call in 1 day For further evaluation [address] Adult [address]  Call  For further evaluation [address]    [address] [place] [address]  [phone]          Discharge Medication List as of 12/29/2021  2:51 AM      CONTINUE these medications which have NOT CHANGED    Details   meloxicam (MOBIC) 7 5 mg tablet Take 1 tablet (7 5 mg total) by mouth daily, Starting Fri 7/30/2021, Print      mupirocin (BACTROBAN) 2 % ointment Apply topically 3 (three) times a day, Starting Sat 6/26/2021, Normal      naproxen (NAPROSYN) 500 mg tablet Take 1 tablet (500 mg total) by mouth 2 (two) times a day as needed for moderate pain for up to 20 doses, Starting Thu 9/23/2021, Print             No discharge procedures on file      PDMP Review     None          ED Provider  Electronically Signed by           Isatu Laboy PA-C  12/29/21 3615

## 2022-01-05 ENCOUNTER — APPOINTMENT (EMERGENCY)
Dept: RADIOLOGY | Facility: HOSPITAL | Age: 27
End: 2022-01-05
Payer: MEDICARE

## 2022-01-05 ENCOUNTER — HOSPITAL ENCOUNTER (EMERGENCY)
Facility: HOSPITAL | Age: 27
Discharge: HOME | End: 2022-01-05
Attending: EMERGENCY MEDICINE | Admitting: EMERGENCY MEDICINE
Payer: MEDICARE

## 2022-01-05 VITALS
HEIGHT: 66 IN | HEART RATE: 57 BPM | RESPIRATION RATE: 19 BRPM | WEIGHT: 130 LBS | OXYGEN SATURATION: 99 % | BODY MASS INDEX: 20.89 KG/M2 | SYSTOLIC BLOOD PRESSURE: 96 MMHG | TEMPERATURE: 97.8 F | DIASTOLIC BLOOD PRESSURE: 58 MMHG

## 2022-01-05 DIAGNOSIS — M25.532 ACUTE PAIN OF LEFT WRIST: Primary | ICD-10-CM

## 2022-01-05 PROCEDURE — 99284 EMERGENCY DEPT VISIT MOD MDM: CPT | Performed by: PHYSICIAN ASSISTANT

## 2022-01-05 PROCEDURE — 99283 EMERGENCY DEPT VISIT LOW MDM: CPT

## 2022-01-05 PROCEDURE — 73110 X-RAY EXAM OF WRIST: CPT

## 2022-01-05 RX ORDER — IBUPROFEN 600 MG/1
600 TABLET ORAL ONCE
Status: COMPLETED | OUTPATIENT
Start: 2022-01-05 | End: 2022-01-05

## 2022-01-05 RX ADMIN — IBUPROFEN 600 MG: 600 TABLET ORAL at 22:04

## 2022-01-05 NOTE — Clinical Note
Ivana Castañeda was seen and treated in our emergency department on 1/5/2022  Diagnosis:     Flor Hugo    He may return on this date: 01/06/2022         If you have any questions or concerns, please don't hesitate to call        Joel Quiroz PA-C    ______________________________           _______________          _______________  Hospital Representative                              Date                                Time

## 2022-01-06 NOTE — ED NOTES
Patient is discharged to home at this time  VSS  AVS given and patient expressed understanding  Wrist splint applied and patient was educated on use        Aguilar Maharaj RN  01/05/22 7647

## 2022-01-06 NOTE — ED PROVIDER NOTES
History  Chief Complaint   Patient presents with    Wrist Injury     pt c/o right wrist pain due to injury  Patient is a 77-year-old male with no significant past medical history presents to the emergency department for evaluation of left wrist pain after a fall  Patient states that he fell from a standing position on outstretched left hand  He is now reporting tightness to his left wrist   He did not hit his head  He did not lose consciousness  He is denying any numbness or tingling  No other complaints at this time  Prior to Admission Medications   Prescriptions Last Dose Informant Patient Reported? Taking?   meloxicam (MOBIC) 7 5 mg tablet   No No   Sig: Take 1 tablet (7 5 mg total) by mouth daily   mupirocin (BACTROBAN) 2 % ointment   No No   Sig: Apply topically 3 (three) times a day   naproxen (NAPROSYN) 500 mg tablet   No No   Sig: Take 1 tablet (500 mg total) by mouth 2 (two) times a day as needed for moderate pain for up to 20 doses      Facility-Administered Medications: None       Past Medical History:   Diagnosis Date    Psychiatric disorder     anxiety       History reviewed  No pertinent surgical history  History reviewed  No pertinent family history  I have reviewed and agree with the history as documented  E-Cigarette/Vaping    E-Cigarette Use Never User      E-Cigarette/Vaping Substances    Nicotine No     THC No     CBD No     Flavoring No     Other No     Unknown No      Social History     Tobacco Use    Smoking status: Current Some Day Smoker     Packs/day: 0 50     Types: Cigarettes    Smokeless tobacco: Never Used    Tobacco comment: quit a week ago  Vaping Use    Vaping Use: Never used   Substance Use Topics    Alcohol use: Yes     Comment: occ    Drug use: Never       Review of Systems   Constitutional: Negative for chills, diaphoresis and fever  HENT: Negative for congestion, drooling, facial swelling, nosebleeds, sore throat and voice change  Eyes: Negative for discharge and redness  Respiratory: Negative for cough, choking, chest tightness, shortness of breath and stridor  Cardiovascular: Negative for chest pain and palpitations  Gastrointestinal: Negative for abdominal pain, diarrhea, nausea and vomiting  Genitourinary: Negative for decreased urine volume, difficulty urinating, dysuria, flank pain, frequency and urgency  Musculoskeletal: Positive for arthralgias  Negative for back pain, neck pain and neck stiffness  Skin: Negative for color change, rash and wound  Neurological: Negative for dizziness, syncope, facial asymmetry, weakness, light-headedness, numbness and headaches  Psychiatric/Behavioral: Negative for confusion and suicidal ideas  The patient is not nervous/anxious  All other systems reviewed and are negative  Physical Exam  Physical Exam  Vitals reviewed  Constitutional:       General: He is not in acute distress  Appearance: Normal appearance  He is normal weight  He is not ill-appearing, toxic-appearing or diaphoretic  HENT:      Head: Normocephalic and atraumatic  Right Ear: External ear normal       Left Ear: External ear normal       Mouth/Throat:      Mouth: Mucous membranes are moist       Pharynx: Oropharynx is clear  No oropharyngeal exudate or posterior oropharyngeal erythema  Eyes:      General: No scleral icterus  Right eye: No discharge  Left eye: No discharge  Extraocular Movements: Extraocular movements intact  Conjunctiva/sclera: Conjunctivae normal       Pupils: Pupils are equal, round, and reactive to light  Cardiovascular:      Rate and Rhythm: Normal rate and regular rhythm  Pulses: Normal pulses  Heart sounds: Normal heart sounds  No murmur heard  No friction rub  No gallop  Pulmonary:      Effort: Pulmonary effort is normal  No respiratory distress  Breath sounds: Normal breath sounds  No stridor  No wheezing, rhonchi or rales  Abdominal:      General: Abdomen is flat  Palpations: Abdomen is soft  Tenderness: There is no abdominal tenderness  There is no right CVA tenderness, left CVA tenderness, guarding or rebound  Musculoskeletal:      Left wrist: No tenderness, bony tenderness or snuff box tenderness  Decreased range of motion  Cervical back: Normal range of motion and neck supple  Right lower leg: No edema  Left lower leg: No edema  Skin:     General: Skin is warm and dry  Capillary Refill: Capillary refill takes less than 2 seconds  Neurological:      General: No focal deficit present  Mental Status: He is alert and oriented to person, place, and time  Psychiatric:         Mood and Affect: Mood normal          Behavior: Behavior normal          Vital Signs  ED Triage Vitals [01/05/22 2129]   Temperature Pulse Respirations Blood Pressure SpO2   97 8 °F (36 6 °C) 57 19 96/58 99 %      Temp Source Heart Rate Source Patient Position - Orthostatic VS BP Location FiO2 (%)   Temporal Monitor Sitting Left arm --      Pain Score       6           Vitals:    01/05/22 2129   BP: 96/58   Pulse: 57   Patient Position - Orthostatic VS: Sitting         Visual Acuity      ED Medications  Medications   ibuprofen (MOTRIN) tablet 600 mg (600 mg Oral Given 1/5/22 2204)       Diagnostic Studies  Results Reviewed     None                 XR wrist 3+ views LEFT    (Results Pending)              Procedures  Procedures         ED Course                                             MDM  Number of Diagnoses or Management Options  Acute pain of left wrist  Diagnosis management comments: Patient presenting to our facility for evaluation of left wrist pain after a fall  He appears comfortable in bed  He is not any acute distress  Vital signs unremarkable  Physical exam without concerning findings  He does have minimal decreased range of motion of left wrist   Pulses 2+ bilaterally    Capillary refill normal   Normal sensation  X-ray did not reveal any acute osseous abnormality  Patient was given ibuprofen and given a wrist brace  He was given instructions to follow-up with his primary care provider  He was given very strict instructions on when to return to the emergency department  Patient stable at this time  Amount and/or Complexity of Data Reviewed  Tests in the radiology section of CPT®: ordered and reviewed    Patient Progress  Patient progress: stable      Disposition  Final diagnoses:   Acute pain of left wrist     Time reflects when diagnosis was documented in both MDM as applicable and the Disposition within this note     Time User Action Codes Description Comment    1/5/2022  9:52 PM Doris Otto Add [M25 532] Acute pain of left wrist       ED Disposition     ED Disposition Condition Date/Time Comment    Discharge Stable Wed Jan 5, 2022  9:52 PM Naveed Santillan discharge to home/self care  Follow-up Information     Follow up With Specialties Details Why Contact Info Additional 2000 [facility] Emergency Department Emergency Medicine Go to  If symptoms worsen [address] Emergency Department, [address] [place], [place], [address] Tan Peters MD Family Medicine Schedule an appointment as soon as possible for a visit  for follow up [address]  9352 [facility] 89  [phone]             Patient's Medications   Discharge Prescriptions    No medications on file       No discharge procedures on file      PDMP Review     None          ED Provider  Electronically Signed by           Nikkie Milan PA-C  01/05/22 3404

## 2022-02-12 ENCOUNTER — APPOINTMENT (EMERGENCY)
Dept: RADIOLOGY | Facility: HOSPITAL | Age: 27
End: 2022-02-12
Payer: MEDICARE

## 2022-02-12 ENCOUNTER — HOSPITAL ENCOUNTER (EMERGENCY)
Facility: HOSPITAL | Age: 27
Discharge: HOME | End: 2022-02-12
Attending: EMERGENCY MEDICINE
Payer: MEDICARE

## 2022-02-12 VITALS
HEART RATE: 88 BPM | TEMPERATURE: 97.4 F | SYSTOLIC BLOOD PRESSURE: 119 MMHG | OXYGEN SATURATION: 99 % | BODY MASS INDEX: 20.89 KG/M2 | HEIGHT: 66 IN | RESPIRATION RATE: 17 BRPM | WEIGHT: 130 LBS | DIASTOLIC BLOOD PRESSURE: 74 MMHG

## 2022-02-12 DIAGNOSIS — S29.012A RHOMBOID MUSCLE STRAIN, INITIAL ENCOUNTER: ICD-10-CM

## 2022-02-12 DIAGNOSIS — M54.6 ACUTE RIGHT-SIDED THORACIC BACK PAIN: Primary | ICD-10-CM

## 2022-02-12 PROCEDURE — 99284 EMERGENCY DEPT VISIT MOD MDM: CPT | Performed by: EMERGENCY MEDICINE

## 2022-02-12 PROCEDURE — 99283 EMERGENCY DEPT VISIT LOW MDM: CPT

## 2022-02-12 PROCEDURE — 71046 X-RAY EXAM CHEST 2 VIEWS: CPT

## 2022-02-12 RX ORDER — NAPROXEN 250 MG/1
250 TABLET ORAL 2 TIMES DAILY WITH MEALS
Qty: 20 TABLET | Refills: 0 | Status: SHIPPED | OUTPATIENT
Start: 2022-02-12 | End: 2022-03-09 | Stop reason: ALTCHOICE

## 2022-02-12 RX ORDER — NAPROXEN 250 MG/1
250 TABLET ORAL ONCE
Status: COMPLETED | OUTPATIENT
Start: 2022-02-12 | End: 2022-02-12

## 2022-02-12 RX ADMIN — NAPROXEN 250 MG: 250 TABLET ORAL at 04:44

## 2022-02-12 NOTE — ED PROVIDER NOTES
Pt Name: Luana Ramon  MRN: [id number]  [place] 1995  Age/Sex: 32 y o  male  Date of evaluation: 2/12/2022  PCP: Kenzie Reyes MD    [address] [place] [address]    Chief Complaint   Patient presents with    Back Pain     x1week, upper between shoulder blades, burning radiating down to mid back and lumbar, recovering from sinus infection , still congested          HPI    32 y o  male presenting with 1 week of back pain  The pain is dull, moderate intensity, low the right shoulder on the back, worse with moving his arm or twisting and better at rest   He also notes a few episodes of feeling a burning pain radiating down to the mid back, usually provoked by twisting  He also states he is recovering from sinus infection, still slightly congested, has occasional cough  Denies shortness of breath, fevers, chest pain, numbness, weakness, trauma, other symptoms  HPI      Past Medical and Surgical History    Past Medical History:   Diagnosis Date    Psychiatric disorder     anxiety       No past surgical history on file  No family history on file  Social History     Tobacco Use    Smoking status: Current Some Day Smoker     Packs/day: 0 50     Types: Cigarettes    Smokeless tobacco: Never Used    Tobacco comment: quit a week ago  Vaping Use    Vaping Use: Never used   Substance Use Topics    Alcohol use: Yes     Comment: occ    Drug use: Never           Allergies    Allergies   Allergen Reactions    Amoxicillin     Amoxicillin Hives       Home Medications    Prior to Admission medications    Medication Sig Start Date End Date Taking?  Authorizing Provider   meloxicam (MOBIC) 7 5 mg tablet Take 1 tablet (7 5 mg total) by mouth daily 7/30/21   Abebe Lechuga MD   mupirocin Michelle Gaunt) 2 % ointment Apply topically 3 (three) times a day 6/26/21   Anthony Baca MD   naproxen (NAPROSYN) 500 mg tablet Take 1 tablet (500 mg total) by mouth 2 (two) times a day as needed for moderate pain for up to 20 doses 9/23/21   Ranjeet Gimenez MD   ibuprofen (MOTRIN) 600 mg tablet Take 600 mg by mouth every 6 (six) hours as needed for mild pain  7/30/21  Historical Provider, MD           Review of Systems    Review of Systems   Constitutional: Negative for appetite change, chills and diaphoresis  HENT: Negative for drooling, facial swelling, trouble swallowing and voice change  Respiratory: Positive for cough  Negative for apnea, shortness of breath and wheezing  Cardiovascular: Negative for chest pain and leg swelling  Gastrointestinal: Negative for abdominal distention, abdominal pain, diarrhea, nausea and vomiting  Genitourinary: Negative for dysuria and urgency  Musculoskeletal: Positive for back pain  Negative for arthralgias, gait problem and neck pain  Skin: Negative for color change, rash and wound  Neurological: Negative for seizures, speech difficulty, weakness and headaches  Psychiatric/Behavioral: Negative for agitation, behavioral problems and dysphoric mood  The patient is not nervous/anxious  All other systems reviewed and negative  Physical Exam      ED Triage Vitals [02/12/22 0309]   Temperature Pulse Respirations Blood Pressure SpO2   (!) 97 4 °F (36 3 °C) 88 17 119/74 99 %      Temp Source Heart Rate Source Patient Position - Orthostatic VS BP Location FiO2 (%)   Tympanic Monitor Sitting Left arm --      Pain Score       5               Physical Exam  Vitals and nursing note reviewed  Constitutional:       Appearance: He is well-developed  HENT:      Head: Normocephalic and atraumatic  Right Ear: External ear normal       Left Ear: External ear normal    Eyes:      Conjunctiva/sclera: Conjunctivae normal       Pupils: Pupils are equal, round, and reactive to light  Neck:      Trachea: No tracheal deviation  Cardiovascular:      Rate and Rhythm: Normal rate and regular rhythm  Heart sounds: Normal heart sounds  No murmur heard        Pulmonary:      Effort: Pulmonary effort is normal  No respiratory distress  Breath sounds: Normal breath sounds  No stridor  No wheezing or rales  Abdominal:      General: There is no distension  Palpations: Abdomen is soft  Tenderness: There is no abdominal tenderness  There is no guarding or rebound  Musculoskeletal:         General: Tenderness present  No deformity  Normal range of motion  Cervical back: Normal range of motion and neck supple  Comments: Patient tender to palpation along the right rhomboid muscle, more so on the middle and inferior portions, palpable muscle spasm noted there is well  No midline tenderness to palpation anywhere in the spine, no skin changes, no fluctuance, no deformity  Skin:     General: Skin is warm and dry  Findings: No rash  Neurological:      Mental Status: He is alert and oriented to person, place, and time  Cranial Nerves: No cranial nerve deficit  Motor: No weakness  Coordination: Coordination normal       Gait: Gait normal       Comments: 5/5 strength in all extremities, ambulating with normal steady gait  Psychiatric:         Behavior: Behavior normal          Thought Content: Thought content normal          Judgment: Judgment normal               Diagnostic Results      Labs:    Results Reviewed     None          All labs reviewed and utilized in the medical decision making process    Radiology:    XR chest 2 views   ED Interpretation   No acute cardiopulmonary process or osseous abnormality  All radiology studies independently viewed by me and interpreted by the radiologist     Procedure    Procedures        ED Course of Care and Re-Assessments      Patient given Naprosyn for symptomatic treatment      Medications   naproxen (NAPROSYN) tablet 250 mg (250 mg Oral Given 2/12/22 6083)           FINAL IMPRESSION    Final diagnoses:   Acute right-sided thoracic back pain   Rhomboid muscle strain, initial encounter DISPOSITION/PLAN    Presentation as above felt most consistent with acute right-sided thoracic back pain secondary to rhomboid muscle strain  Vital signs reassuring, examination likewise reassuring  Very low suspicion for unstable fracture or dislocation, critical cord or nerve root compression, spinal epidural hematoma or abscess, cauda equina syndrome, bacterial pneumonia, pneumothorax, other acute threat to life limb or nerve  Treated symptomatically, discharged strict return precautions, follow up primary care doctor  Time reflects when diagnosis was documented in both MDM as applicable and the Disposition within this note     Time User Action Codes Description Comment    2/12/2022  4:33 AM Damián IGLESIAS Add [M54 6] Acute right-sided thoracic back pain     2/12/2022  4:34 AM Damiánvinicio Eaton T Add [S29 012A] Rhomboid muscle strain, initial encounter       ED Disposition     ED Disposition Condition Date/Time Comment    Discharge Stable Sat Feb 12, 2022  4:33 AM Rice Lofty discharge to home/self care              Follow-up Information     Follow up With Specialties Details Why Contact Info Additional 2000 [facility] Emergency Department Emergency Medicine Go to  If symptoms worsen 34 [facility] [address]  [address] [facility] Emergency Department, [address] [facility], [facility], [address] Tan Peters MD Family Medicine Call  As needed [address]  9[address] [facility] [address]               PATIENT REFERRED TO:    [facility] Emergency Department  [address] [facility] [id number] [phone]  Go to   If symptoms worsen    Maura Casiano MD  [address]  [address] [place] [address]  [phone]    Call   As needed      DISCHARGE MEDICATIONS:    Discharge Medication List as of 2/12/2022  4:35 AM      CONTINUE these medications which have CHANGED    Details   naproxen (NAPROSYN) 250 mg tablet Take 1 tablet (250 mg total) by mouth 2 (two) times a day with meals, Starting Sat 2/12/2022, Print         CONTINUE these medications which have NOT CHANGED    Details   mupirocin (BACTROBAN) 2 % ointment Apply topically 3 (three) times a day, Starting Sat 6/26/2021, Normal         STOP taking these medications       meloxicam (MOBIC) 7 5 mg tablet Comments:   Reason for Stopping:                        Ed Valenzuela MD    Portions of the record may have been created with voice recognition software  Occasional wrong word or "sound alike" substitutions may have occurred due to the inherent limitations of voice recognition software    Please read the chart carefully and recognize, using context, where substitutions have occurred     Ed Valenzuela MD  02/12/22 8145

## 2022-02-17 ENCOUNTER — LAB (OUTPATIENT)
Dept: LAB | Facility: CLINIC | Age: 27
End: 2022-02-17

## 2022-02-17 ENCOUNTER — OCCMED (OUTPATIENT)
Dept: URGENT CARE | Facility: CLINIC | Age: 27
End: 2022-02-17

## 2022-02-17 DIAGNOSIS — Z02.1 PHYSICAL EXAM, PRE-EMPLOYMENT: Primary | ICD-10-CM

## 2022-02-17 DIAGNOSIS — Z02.1 PHYSICAL EXAM, PRE-EMPLOYMENT: ICD-10-CM

## 2022-02-17 LAB
ALBUMIN SERPL BCP-MCNC: 4.2 G/DL (ref 3.5–5)
ALP SERPL-CCNC: 102 U/L (ref 46–116)
ALT SERPL W P-5'-P-CCNC: 30 U/L (ref 12–78)
ANION GAP SERPL CALCULATED.3IONS-SCNC: 4 MMOL/L (ref 4–13)
AST SERPL W P-5'-P-CCNC: 18 U/L (ref 5–45)
ATRIAL RATE: 60 BPM
BACTERIA UR QL AUTO: NORMAL /HPF
BILIRUB SERPL-MCNC: 0.27 MG/DL (ref 0.2–1)
BILIRUB UR QL STRIP: NEGATIVE
BUN SERPL-MCNC: 8 MG/DL (ref 5–25)
CALCIUM SERPL-MCNC: 9.8 MG/DL (ref 8.3–10.1)
CHLORIDE SERPL-SCNC: 104 MMOL/L (ref 100–108)
CHOLEST SERPL-MCNC: 153 MG/DL
CLARITY UR: CLEAR
CO2 SERPL-SCNC: 31 MMOL/L (ref 21–32)
COLOR UR: YELLOW
CREAT SERPL-MCNC: 0.71 MG/DL (ref 0.6–1.3)
GFR SERPL CREATININE-BSD FRML MDRD: 129 ML/MIN/1.73SQ M
GLUCOSE P FAST SERPL-MCNC: 92 MG/DL (ref 65–99)
GLUCOSE UR STRIP-MCNC: NEGATIVE MG/DL
HDLC SERPL-MCNC: 39 MG/DL
HGB UR QL STRIP.AUTO: NEGATIVE
KETONES UR STRIP-MCNC: NEGATIVE MG/DL
LDLC SERPL CALC-MCNC: 98 MG/DL (ref 0–100)
LEUKOCYTE ESTERASE UR QL STRIP: NEGATIVE
NITRITE UR QL STRIP: NEGATIVE
NON-SQ EPI CELLS URNS QL MICRO: NORMAL /HPF
NONHDLC SERPL-MCNC: 114 MG/DL
P AXIS: 9 DEGREES
PH UR STRIP.AUTO: 7 [PH]
POTASSIUM SERPL-SCNC: 3.1 MMOL/L (ref 3.5–5.3)
PR INTERVAL: 132 MS
PROT SERPL-MCNC: 7.3 G/DL (ref 6.4–8.2)
PROT UR STRIP-MCNC: NEGATIVE MG/DL
QRS AXIS: 75 DEGREES
QRSD INTERVAL: 96 MS
QT INTERVAL: 424 MS
QTC INTERVAL: 424 MS
RBC #/AREA URNS AUTO: NORMAL /HPF
SODIUM SERPL-SCNC: 139 MMOL/L (ref 136–145)
SP GR UR STRIP.AUTO: 1.01 (ref 1–1.03)
T WAVE AXIS: 51 DEGREES
TRIGL SERPL-MCNC: 78 MG/DL
UROBILINOGEN UR QL STRIP.AUTO: 1 E.U./DL
VENTRICULAR RATE: 60 BPM
WBC #/AREA URNS AUTO: NORMAL /HPF

## 2022-02-17 PROCEDURE — 80061 LIPID PANEL: CPT

## 2022-02-17 PROCEDURE — 80053 COMPREHEN METABOLIC PANEL: CPT

## 2022-02-17 PROCEDURE — 81001 URINALYSIS AUTO W/SCOPE: CPT

## 2022-02-17 PROCEDURE — 93005 ELECTROCARDIOGRAM TRACING: CPT

## 2022-02-17 PROCEDURE — 36415 COLL VENOUS BLD VENIPUNCTURE: CPT

## 2022-02-17 NOTE — RESULT ENCOUNTER NOTE
Your potassium level is low  You can get OTC potassium pills to take for symptoms and follow up with your primary care doctor regarding to ensure your level goes back up and to look into why it has continued to be low (Level was 3 3 at last result and lower this time at 3 1) You want to make sure your potassium doesn't get too low or this could affect your heart

## 2022-02-18 DIAGNOSIS — Z02.1 PHYSICAL EXAM, PRE-EMPLOYMENT: Primary | ICD-10-CM

## 2022-03-09 ENCOUNTER — HOSPITAL ENCOUNTER (EMERGENCY)
Facility: HOSPITAL | Age: 27
Discharge: HOME | End: 2022-03-10
Attending: EMERGENCY MEDICINE
Payer: MEDICARE

## 2022-03-09 ENCOUNTER — APPOINTMENT (EMERGENCY)
Dept: CT IMAGING | Facility: HOSPITAL | Age: 27
End: 2022-03-09
Payer: MEDICARE

## 2022-03-09 VITALS
BODY MASS INDEX: 20.89 KG/M2 | OXYGEN SATURATION: 99 % | TEMPERATURE: 97.7 F | SYSTOLIC BLOOD PRESSURE: 128 MMHG | HEIGHT: 66 IN | WEIGHT: 130 LBS | HEART RATE: 67 BPM | DIASTOLIC BLOOD PRESSURE: 61 MMHG | RESPIRATION RATE: 15 BRPM

## 2022-03-09 DIAGNOSIS — R30.0 DYSURIA: Primary | ICD-10-CM

## 2022-03-09 LAB
ALBUMIN SERPL BCP-MCNC: 4 G/DL (ref 3.5–5)
ALP SERPL-CCNC: 108 U/L (ref 46–116)
ALT SERPL W P-5'-P-CCNC: 26 U/L (ref 12–78)
ANION GAP SERPL CALCULATED.3IONS-SCNC: 7 MMOL/L (ref 4–13)
AST SERPL W P-5'-P-CCNC: 17 U/L (ref 5–45)
BASOPHILS # BLD AUTO: 0.07 THOUSANDS/ΜL (ref 0–0.1)
BASOPHILS NFR BLD AUTO: 1 % (ref 0–1)
BILIRUB SERPL-MCNC: 0.25 MG/DL (ref 0.2–1)
BILIRUB UR QL STRIP: NEGATIVE
BUN SERPL-MCNC: 7 MG/DL (ref 5–25)
CALCIUM SERPL-MCNC: 8.8 MG/DL (ref 8.3–10.1)
CHLORIDE SERPL-SCNC: 101 MMOL/L (ref 100–108)
CLARITY UR: CLEAR
CO2 SERPL-SCNC: 30 MMOL/L (ref 21–32)
COLOR UR: YELLOW
CREAT SERPL-MCNC: 0.77 MG/DL (ref 0.6–1.3)
EOSINOPHIL # BLD AUTO: 0.32 THOUSAND/ΜL (ref 0–0.61)
EOSINOPHIL NFR BLD AUTO: 4 % (ref 0–6)
ERYTHROCYTE [DISTWIDTH] IN BLOOD BY AUTOMATED COUNT: 13.1 % (ref 11.6–15.1)
GFR SERPL CREATININE-BSD FRML MDRD: 125 ML/MIN/1.73SQ M
GLUCOSE SERPL-MCNC: 105 MG/DL (ref 65–140)
GLUCOSE UR STRIP-MCNC: NEGATIVE MG/DL
HCT VFR BLD AUTO: 45.7 % (ref 36.5–49.3)
HGB BLD-MCNC: 15.8 G/DL (ref 12–17)
HGB UR QL STRIP.AUTO: NEGATIVE
IMM GRANULOCYTES # BLD AUTO: 0.03 THOUSAND/UL (ref 0–0.2)
IMM GRANULOCYTES NFR BLD AUTO: 0 % (ref 0–2)
KETONES UR STRIP-MCNC: NEGATIVE MG/DL
LEUKOCYTE ESTERASE UR QL STRIP: NEGATIVE
LIPASE SERPL-CCNC: 104 U/L (ref 73–393)
LYMPHOCYTES # BLD AUTO: 4.1 THOUSANDS/ΜL (ref 0.6–4.47)
LYMPHOCYTES NFR BLD AUTO: 44 % (ref 14–44)
MCH RBC QN AUTO: 29.4 PG (ref 26.8–34.3)
MCHC RBC AUTO-ENTMCNC: 34.6 G/DL (ref 31.4–37.4)
MCV RBC AUTO: 85 FL (ref 82–98)
MONOCYTES # BLD AUTO: 0.63 THOUSAND/ΜL (ref 0.17–1.22)
MONOCYTES NFR BLD AUTO: 7 % (ref 4–12)
NEUTROPHILS # BLD AUTO: 4.11 THOUSANDS/ΜL (ref 1.85–7.62)
NEUTS SEG NFR BLD AUTO: 44 % (ref 43–75)
NITRITE UR QL STRIP: NEGATIVE
NRBC BLD AUTO-RTO: 0 /100 WBCS
PH UR STRIP.AUTO: 6.5 [PH]
PLATELET # BLD AUTO: 345 THOUSANDS/UL (ref 149–390)
PMV BLD AUTO: 9.8 FL (ref 8.9–12.7)
POTASSIUM SERPL-SCNC: 3.6 MMOL/L (ref 3.5–5.3)
PROT SERPL-MCNC: 7 G/DL (ref 6.4–8.2)
PROT UR STRIP-MCNC: NEGATIVE MG/DL
RBC # BLD AUTO: 5.37 MILLION/UL (ref 3.88–5.62)
SODIUM SERPL-SCNC: 138 MMOL/L (ref 136–145)
SP GR UR STRIP.AUTO: 1.02 (ref 1–1.03)
UROBILINOGEN UR QL STRIP.AUTO: 1 E.U./DL
WBC # BLD AUTO: 9.26 THOUSAND/UL (ref 4.31–10.16)

## 2022-03-09 PROCEDURE — 81003 URINALYSIS AUTO W/O SCOPE: CPT | Performed by: EMERGENCY MEDICINE

## 2022-03-09 PROCEDURE — 96374 THER/PROPH/DIAG INJ IV PUSH: CPT

## 2022-03-09 PROCEDURE — 99284 EMERGENCY DEPT VISIT MOD MDM: CPT | Performed by: EMERGENCY MEDICINE

## 2022-03-09 PROCEDURE — 87591 N.GONORRHOEAE DNA AMP PROB: CPT | Performed by: EMERGENCY MEDICINE

## 2022-03-09 PROCEDURE — 99284 EMERGENCY DEPT VISIT MOD MDM: CPT

## 2022-03-09 PROCEDURE — 83690 ASSAY OF LIPASE: CPT | Performed by: EMERGENCY MEDICINE

## 2022-03-09 PROCEDURE — 80053 COMPREHEN METABOLIC PANEL: CPT | Performed by: EMERGENCY MEDICINE

## 2022-03-09 PROCEDURE — 96361 HYDRATE IV INFUSION ADD-ON: CPT

## 2022-03-09 PROCEDURE — 87491 CHLMYD TRACH DNA AMP PROBE: CPT | Performed by: EMERGENCY MEDICINE

## 2022-03-09 PROCEDURE — 74177 CT ABD & PELVIS W/CONTRAST: CPT

## 2022-03-09 PROCEDURE — 85025 COMPLETE CBC W/AUTO DIFF WBC: CPT | Performed by: EMERGENCY MEDICINE

## 2022-03-09 PROCEDURE — 36415 COLL VENOUS BLD VENIPUNCTURE: CPT | Performed by: EMERGENCY MEDICINE

## 2022-03-09 RX ORDER — KETOROLAC TROMETHAMINE 30 MG/ML
15 INJECTION, SOLUTION INTRAMUSCULAR; INTRAVENOUS ONCE
Status: COMPLETED | OUTPATIENT
Start: 2022-03-09 | End: 2022-03-09

## 2022-03-09 RX ADMIN — SODIUM CHLORIDE 1000 ML: 9 INJECTION, SOLUTION INTRAVENOUS at 23:38

## 2022-03-09 RX ADMIN — KETOROLAC TROMETHAMINE 15 MG: 30 INJECTION, SOLUTION INTRAMUSCULAR at 23:37

## 2022-03-09 RX ADMIN — IOHEXOL 100 ML: 350 INJECTION, SOLUTION INTRAVENOUS at 23:44

## 2022-03-10 NOTE — ED PROVIDER NOTES
History  Chief Complaint   Patient presents with    Possible UTI     c/o urinating but still having the sensation of having a full bladder  Having pain at the tip of the penis, no burning sensation  PMD stated he has a kidney stone seen on xray, end of Feb  Very mild flank pain  59-year-old male presenting to the emergency department for evaluation of possible UTI  Patient complains of some dysuria, sensation of having for bladder after voiding, some burning lower abdominal pain as well  Patient was told that he has a kidney stone seen on previous imaging  Patient denies fevers or chills  Denies nausea or vomiting  Denies lightheadedness syncope presyncope chest pain or shortness of breath  He is not overly concerned about STDs but is requesting a screening here today  None       Past Medical History:   Diagnosis Date    Psychiatric disorder     anxiety       No past surgical history on file  No family history on file  I have reviewed and agree with the history as documented  E-Cigarette/Vaping    E-Cigarette Use Never User      E-Cigarette/Vaping Substances    Nicotine No     THC No     CBD No     Flavoring No     Other No     Unknown No      Social History     Tobacco Use    Smoking status: Current Some Day Smoker     Packs/day: 0 50     Types: Cigarettes    Smokeless tobacco: Never Used    Tobacco comment: quit a week ago  Vaping Use    Vaping Use: Never used   Substance Use Topics    Alcohol use: Yes     Comment: occ    Drug use: Never       Review of Systems   Constitutional: Negative for appetite change, chills, fatigue and fever  HENT: Negative for sneezing and sore throat  Eyes: Negative for visual disturbance  Respiratory: Negative for cough, choking, chest tightness, shortness of breath and wheezing  Cardiovascular: Negative for chest pain and palpitations  Gastrointestinal: Negative for abdominal pain, constipation, diarrhea, nausea and vomiting  Genitourinary: Positive for dysuria  Negative for difficulty urinating  Neurological: Negative for dizziness, weakness, light-headedness, numbness and headaches  All other systems reviewed and are negative  Physical Exam  Physical Exam  Constitutional:       General: He is not in acute distress  Appearance: He is well-developed  He is not diaphoretic  HENT:      Head: Normocephalic and atraumatic  Eyes:      Pupils: Pupils are equal, round, and reactive to light  Neck:      Vascular: No JVD  Trachea: No tracheal deviation  Cardiovascular:      Rate and Rhythm: Normal rate and regular rhythm  Heart sounds: Normal heart sounds  No murmur heard  No friction rub  No gallop  Pulmonary:      Effort: Pulmonary effort is normal  No respiratory distress  Breath sounds: Normal breath sounds  No wheezing or rales  Abdominal:      General: Bowel sounds are normal  There is no distension  Palpations: Abdomen is soft  Tenderness: There is no abdominal tenderness  There is no guarding or rebound  Skin:     General: Skin is warm and dry  Coloration: Skin is not pale  Neurological:      Mental Status: He is alert and oriented to person, place, and time  Cranial Nerves: No cranial nerve deficit  Motor: No abnormal muscle tone     Psychiatric:         Behavior: Behavior normal          Vital Signs  ED Triage Vitals [03/09/22 2247]   Temperature Pulse Respirations Blood Pressure SpO2   97 7 °F (36 5 °C) 67 15 128/61 99 %      Temp Source Heart Rate Source Patient Position - Orthostatic VS BP Location FiO2 (%)   Oral Monitor Sitting Right arm --      Pain Score       4           Vitals:    03/09/22 2247   BP: 128/61   Pulse: 67   Patient Position - Orthostatic VS: Sitting         Visual Acuity      ED Medications  Medications   sodium chloride 0 9 % bolus 1,000 mL (0 mL Intravenous Stopped 3/10/22 0052)   ketorolac (TORADOL) injection 15 mg (15 mg Intravenous Given 3/9/22 2337)   iohexol (OMNIPAQUE) 350 MG/ML injection (MULTI-DOSE) 100 mL (100 mL Intravenous Given 3/9/22 2344)       Diagnostic Studies  Results Reviewed     Procedure Component Value Units Date/Time    UA w Reflex to Microscopic w Reflex to Culture [[id number]] Collected: 03/09/22 2339    Lab Status: Final result Specimen: Urine, Other Updated: 03/09/22 2345     Color, UA Yellow     Clarity, UA Clear     Specific Gravity, UA 1 025     pH, UA 6 5     Leukocytes, UA Negative     Nitrite, UA Negative     Protein, UA Negative mg/dl      Glucose, UA Negative mg/dl      Ketones, UA Negative mg/dl      Urobilinogen, UA 1 0 E U /dl      Bilirubin, UA Negative     Blood, UA Negative    Chlamydia/GC amplified DNA by PCR [[id number]] Collected: 03/09/22 2339    Lab Status:  In process Specimen: Urine, Other Updated: 03/09/22 2342    Comprehensive metabolic panel [[id number]] Collected: 03/09/22 2315    Lab Status: Final result Specimen: Blood from Arm, Right Updated: 03/09/22 2334     Sodium 138 mmol/L      Potassium 3 6 mmol/L      Chloride 101 mmol/L      CO2 30 mmol/L      ANION GAP 7 mmol/L      BUN 7 mg/dL      Creatinine 0 77 mg/dL      Glucose 105 mg/dL      Calcium 8 8 mg/dL      AST 17 U/L      ALT 26 U/L      Alkaline Phosphatase 108 U/L      Total Protein 7 0 g/dL      Albumin 4 0 g/dL      Total Bilirubin 0 25 mg/dL      eGFR 125 ml/min/1 73sq m     Narrative:      [place] guidelines for Chronic Kidney Disease (CKD):     Stage 1 with normal or high GFR (GFR > 90 mL/min/1 73 square meters)    Stage 2 Mild CKD (GFR = 60-89 mL/min/1 73 square meters)    Stage 3A Moderate CKD (GFR = 45-59 mL/min/1 73 square meters)    Stage 3B Moderate CKD (GFR = 30-44 mL/min/1 73 square meters)    Stage 4 Severe CKD (GFR = 15-29 mL/min/1 73 square meters)    Stage 5 End Stage CKD (GFR <15 mL/min/1 73 square meters)  Note: GFR calculation is accurate only with a steady state creatinine    Lipase [[id number]]  (Normal) Collected: 03/09/22 2315    Lab Status: Final result Specimen: Blood from Arm, Right Updated: 03/09/22 2334     Lipase 104 u/L     CBC and differential [[id number]] Collected: 03/09/22 2315    Lab Status: Final result Specimen: Blood from Arm, Right Updated: 03/09/22 2320     WBC 9 26 Thousand/uL      RBC 5 37 Million/uL      Hemoglobin 15 8 g/dL      Hematocrit 45 7 %      MCV 85 fL      MCH 29 4 pg      MCHC 34 6 g/dL      RDW 13 1 %      MPV 9 8 fL      Platelets 693 Thousands/uL      nRBC 0 /100 WBCs      Neutrophils Relative 44 %      Immat GRANS % 0 %      Lymphocytes Relative 44 %      Monocytes Relative 7 %      Eosinophils Relative 4 %      Basophils Relative 1 %      Neutrophils Absolute 4 11 Thousands/µL      Immature Grans Absolute 0 03 Thousand/uL      Lymphocytes Absolute 4 10 Thousands/µL      Monocytes Absolute 0 63 Thousand/µL      Eosinophils Absolute 0 32 Thousand/µL      Basophils Absolute 0 07 Thousands/µL                  CT abdomen pelvis with contrast   Final Result by Deion Wood MD (03/10 0011)      No acute intra-abdominal abnormality  No free air or free fluid  No hydronephrosis or hydroureter  2 mm nonobstructing calculus at the right renal midpole  Workstation performed: [id number]                    Procedures  Procedures         ED Course                                             MDM  Number of Diagnoses or Management Options  Dysuria  Diagnosis management comments: 51-year-old with dysuria and flank pain, will check labs, urine, CT, give pain meds, reassess        Disposition  Final diagnoses:   Dysuria     Time reflects when diagnosis was documented in both MDM as applicable and the Disposition within this note     Time User Action Codes Description Comment    3/10/2022 12:39 AM Unruly Helm Add [R30 0] Dysuria       ED Disposition     ED Disposition Condition Date/Time Comment    Discharge Stable Thu Mar 10, 2022 12:39 AM Ross Christensen discharge to home/self care  Follow-up Information     Follow up With Specialties Details Why [address] [facility] [address]  [address] [place] [address]  [phone]            There are no discharge medications for this patient  No discharge procedures on file      PDMP Review     None          ED Provider  Electronically Signed by           Sakshi Garcia MD  03/10/22 1417

## 2022-03-15 LAB
C TRACH DNA SPEC QL NAA+PROBE: NEGATIVE
N GONORRHOEA DNA SPEC QL NAA+PROBE: NEGATIVE

## 2022-05-05 ENCOUNTER — AMB VIDEO VISIT (OUTPATIENT)
Dept: OTHER | Facility: HOSPITAL | Age: 27
End: 2022-05-05

## 2022-05-05 PROCEDURE — ECARE PR SL URGENT CARE VIRTUAL VISIT: Performed by: HOSPITALIST

## 2022-05-05 NOTE — CARE ANYWHERE EVISITS
Visit Summary for Baker Borders   Trigili - Gender: Male - Date of Birth: [id number]  Date: [id number] - Duration: 3 minutes  Patient: Raheem Carpenter  Provider: Valeda Halsted    Patient Contact Information  Address  [address] [place] [address]; [place] [address]  [phone]    Visit Topics  Stomach pain throwing up and diarrhea [Added By: Self - 2022-05-05]    Triage Questions   What is your current physical address in the event of a medical emergency? Answer []  Are you allergic to any medications? Answer []  Are you now or could you be pregnant? Answer []  Do you have any immune system compromise or chronic lung   disease? Answer []  Do you have any vulnerable family members in the home (infant, pregnant, cancer, elderly)? Answer []     Conversation Transcripts  [0A][0A] [Notification] You are connected with Abdirizak Maria Estherchanelle [place], Adult Medicine [0A][Notification] Darwin Petersen is located in [place]  [0A][Notification] Darwin Petersen has shared health history  Shantal Silvestre  [0A][Notification] Valeda Halsted has added a   prescription [0A][Notification] Abdirizak CartagenaWhois has added a diagnosis/procedure code  [0A]    Diagnosis  Contact with and (suspected) exposure to COVID-19    Procedures    Medications Prescribed    Zofran (as hydrochloride)      Frequency :   Patient Instructions : 1 tab PO every 8 hrs as needed  Refills : 0  Instructions to the Pharmacist : Substitutions allowed      Provider Notes  [0A][0A] [0A]We strongly encourage you to share the following record of today's visit with your primary care physician  [0A][0A][0A][0A]Contact phone number: [0A][0A][0A][0A]Mode of Communication: video /BATHTUB[0A][0A][0A][0A]HPI: 32 yrs old male called   for diarrhea, body ache and feeling cold  the patient has diarrhea whole day and vomiting  The patient denies fever but have chills  The patient denies chest pain, sob and cough  The patient is not vaccinated  [0A][0A][0A][0A][0A][0A]PMH:   none[0A][0A]PSH:none[0A][0A]Meds:none[0A][0A]Allergies: AMOXICILLIN[0A][0A][0A][0A]Exam: [0A][0A]Gen: Alert, normal mental status and interaction, no visible distress, non- toxic appearance  [0A][0A][0A][0A]Assessment: Diarrheas /body ache and   vomiting[0A][0A][0A][0A]Plan:  [0A][0A]1 COVID 19 test 2 Check temp [0A]3 check pulse oximetry [0A]4  Imodium for diarrhea[0A]5 kEEP HYDRATED [0A]6  The patient will be in isolation for 5 days and day 5 day patient should be temp free floor 24 hrs [0A]7   Zofrabn 4 mg every 8 hr as needed[0A][0A]Follow up:[0A][0A]1  If there are any questions or problems with the prescription, call [phone] anytime for assistance  [0A][0A]2  Please re-connect for another online visit or see an in-person provider   should your symptoms worsen or persist   [0A][0A]3  Taking a probiotic (either in pill form or by eating yogurt that contains probiotics) while using antibiotics can help prevent some of the troublesome side effects that antibiotics can sometimes   cause [0A][0A]4  Please print a copy of this note and send it to your regular doctor, or take it to your next visit so it may be included in your medical record  [0A][0A][0A][0A]Patient voiced understanding and agrees to plan [0A][0A][0A][0A]Please see   your PCP on an annual basis  [0A]    Electronically signed by: Abdirizak Sun(NPI [id number])

## 2022-05-07 ENCOUNTER — HOSPITAL ENCOUNTER (EMERGENCY)
Facility: HOSPITAL | Age: 27
Discharge: HOME | End: 2022-05-07
Attending: EMERGENCY MEDICINE
Payer: MEDICARE

## 2022-05-07 VITALS
TEMPERATURE: 99.1 F | RESPIRATION RATE: 18 BRPM | HEART RATE: 97 BPM | OXYGEN SATURATION: 98 % | SYSTOLIC BLOOD PRESSURE: 106 MMHG | DIASTOLIC BLOOD PRESSURE: 63 MMHG

## 2022-05-07 DIAGNOSIS — B34.9 VIRAL SYNDROME: Primary | ICD-10-CM

## 2022-05-07 PROCEDURE — 99283 EMERGENCY DEPT VISIT LOW MDM: CPT

## 2022-05-07 PROCEDURE — 99282 EMERGENCY DEPT VISIT SF MDM: CPT | Performed by: PHYSICIAN ASSISTANT

## 2022-05-07 RX ORDER — DICYCLOMINE HCL 20 MG
20 TABLET ORAL 3 TIMES DAILY PRN
Qty: 20 TABLET | Refills: 0 | Status: SHIPPED | OUTPATIENT
Start: 2022-05-07 | End: 2022-05-22 | Stop reason: ALTCHOICE

## 2022-05-07 RX ORDER — ONDANSETRON 4 MG/1
4 TABLET, ORALLY DISINTEGRATING ORAL EVERY 8 HOURS PRN
Qty: 15 TABLET | Refills: 0 | Status: SHIPPED | OUTPATIENT
Start: 2022-05-07 | End: 2022-05-22 | Stop reason: ALTCHOICE

## 2022-05-07 RX ORDER — OXYMETAZOLINE HYDROCHLORIDE 0.05 G/100ML
2 SPRAY NASAL ONCE
Status: COMPLETED | OUTPATIENT
Start: 2022-05-07 | End: 2022-05-07

## 2022-05-07 RX ORDER — DICYCLOMINE HCL 20 MG
20 TABLET ORAL 3 TIMES DAILY PRN
Qty: 20 TABLET | Refills: 0 | Status: SHIPPED | OUTPATIENT
Start: 2022-05-07 | End: 2022-05-07 | Stop reason: SDUPTHER

## 2022-05-07 RX ADMIN — OXYMETAZOLINE HCL 2 SPRAY: 0.05 SPRAY NASAL at 21:24

## 2022-05-08 NOTE — ED PROVIDER NOTES
History  Chief Complaint   Patient presents with    Flu Symptoms     pt c/o sinus pressure nausea and body aches since wednesday night  33 yo male pt here for evaluation of multiple symptoms  Began about 3 days ago  Initially thought he had food poisoning  States he had nausea, vomiting, diarrhea with mild abd pain  This improved yesterday and is much better today but is now having bilateral frontal and maxillary sinus pressure  Left sided temporal headache that began yesterday but is improved today  No vision changes  No extremity numbness, tingling, weakness  Bilateral neck pain without stiffness  No fever  Had a negative flu and covid swab earlier this week  History provided by:  Patient   used: No    URI  Presenting symptoms: congestion    Presenting symptoms: no cough, no ear pain, no fever and no sore throat    Severity:  Moderate  Onset quality:  Sudden  Duration:  3 days  Timing:  Constant  Progression:  Worsening  Chronicity:  New  Relieved by:  Nothing  Worsened by:  Nothing  Ineffective treatments:  None tried  Associated symptoms: headaches, myalgias and neck pain    Associated symptoms: no arthralgias        None       Past Medical History:   Diagnosis Date    Psychiatric disorder     anxiety       History reviewed  No pertinent surgical history  History reviewed  No pertinent family history  I have reviewed and agree with the history as documented  E-Cigarette/Vaping    E-Cigarette Use Never User      E-Cigarette/Vaping Substances    Nicotine No     THC No     CBD No     Flavoring No     Other No     Unknown No      Social History     Tobacco Use    Smoking status: Current Some Day Smoker     Packs/day: 0 50     Types: Cigarettes    Smokeless tobacco: Never Used    Tobacco comment: quit a week ago      Vaping Use    Vaping Use: Never used   Substance Use Topics    Alcohol use: Yes     Comment: occ    Drug use: Never       Review of Systems Constitutional: Negative for chills and fever  HENT: Positive for congestion and sinus pressure  Negative for ear pain and sore throat  Eyes: Negative for pain and visual disturbance  Respiratory: Negative for cough and shortness of breath  Cardiovascular: Negative for chest pain and palpitations  Gastrointestinal: Positive for abdominal pain, diarrhea and nausea  Negative for vomiting  Genitourinary: Negative for dysuria and hematuria  Musculoskeletal: Positive for myalgias and neck pain  Negative for arthralgias and back pain  Skin: Negative for color change and rash  Neurological: Positive for headaches  Negative for seizures and syncope  All other systems reviewed and are negative  Physical Exam  Physical Exam  Vitals and nursing note reviewed  Constitutional:       Appearance: He is well-developed  HENT:      Head: Normocephalic and atraumatic  Right Ear: Hearing, tympanic membrane, ear canal and external ear normal       Left Ear: Hearing, tympanic membrane, ear canal and external ear normal       Nose: Nose normal       Comments: No percussive tenderness of frontal or maxillary sinuses  Bilateral posterior cervical adenopathy  Eyes:      Conjunctiva/sclera: Conjunctivae normal    Cardiovascular:      Rate and Rhythm: Normal rate and regular rhythm  Heart sounds: No murmur heard  Pulmonary:      Effort: Pulmonary effort is normal  No respiratory distress  Breath sounds: Normal breath sounds  Abdominal:      Palpations: Abdomen is soft  Tenderness: There is no abdominal tenderness  Musculoskeletal:      Cervical back: Neck supple  Skin:     General: Skin is warm and dry  Neurological:      Mental Status: He is alert           Vital Signs  ED Triage Vitals [05/07/22 2057]   Temperature Pulse Respirations Blood Pressure SpO2   99 1 °F (37 3 °C) 97 18 106/63 98 %      Temp Source Heart Rate Source Patient Position - Orthostatic VS BP Location FiO2 (%)   Tympanic Monitor Sitting Left arm --      Pain Score       --           Vitals:    05/07/22 2057   BP: 106/63   Pulse: 97   Patient Position - Orthostatic VS: Sitting         Visual Acuity      ED Medications  Medications   oxymetazoline (AFRIN) 0 05 % nasal spray 2 spray (2 sprays Each Nare Given 5/7/22 2124)       Diagnostic Studies  Results Reviewed     None                 No orders to display              Procedures  Procedures         ED Course                               SBIRT 22yo+      Most Recent Value   SBIRT (24 yo +)    In order to provide better care to our patients, we are screening all of our patients for alcohol and drug use  Would it be okay to ask you these screening questions? Yes Filed at: 05/07/2022 2123   Initial Alcohol Screen: US AUDIT-C     1  How often do you have a drink containing alcohol? 1 Filed at: 05/07/2022 2123   2  How many drinks containing alcohol do you have on a typical day you are drinking? 1 Filed at: 05/07/2022 2123   3a  Male UNDER 65: How often do you have five or more drinks on one occasion? 0 Filed at: 05/07/2022 2123   Audit-C Score 2 Filed at: 05/07/2022 2123   JAMES: How many times in the past year have you    Used an illegal drug or used a prescription medication for non-medical reasons? Never Filed at: 05/07/2022 2123                    MDM  Number of Diagnoses or Management Options  Viral syndrome: new and requires workup  Diagnosis management comments: DDx including but not limited to:  URI, bronchitis, pneumonia, GERD, enteritis, gastroenteritis, viral illness, COVID 23  Risk of Complications, Morbidity, and/or Mortality  Presenting problems: low  Management options: low  General comments: 31 yo with URI symptoms as well as improving GI symptoms  Benign exam  Vitals unremarkable  Suspect viral syndrome  Recommended conservative management  Doubt bacterial sinusitis  Doubt sinus thrombosis       Patient Progress  Patient progress: stable      Disposition  Final diagnoses:   Viral syndrome     Time reflects when diagnosis was documented in both MDM as applicable and the Disposition within this note     Time User Action Codes Description Comment    5/7/2022  9:12 PM Quang Farooq Add [B34 9] Viral syndrome       ED Disposition     ED Disposition Condition Date/Time Comment    Discharge Stable Sat May 7, 2022  9:12 PM Arva Jonas discharge to home/self care  Follow-up Information     Follow up With Specialties Details Why [address] Brooks, MD Family Medicine Call in 2 days  [address] [place] [address]  [phone]            There are no discharge medications for this patient  No discharge procedures on file      PDMP Review     None          ED Provider  Electronically Signed by           Galen Michelle PA-C  05/07/22 5024

## 2022-05-22 ENCOUNTER — AMB VIDEO VISIT (OUTPATIENT)
Dept: OTHER | Facility: HOSPITAL | Age: 27
End: 2022-05-22

## 2022-05-22 DIAGNOSIS — R19.8 BORBORYGMI: Primary | ICD-10-CM

## 2022-05-22 PROBLEM — Z20.2 POSSIBLE EXPOSURE TO STD: Status: RESOLVED | Noted: 2020-10-25 | Resolved: 2022-05-22

## 2022-05-22 PROBLEM — S63.502A SPRAIN OF LEFT WRIST: Status: RESOLVED | Noted: 2021-05-23 | Resolved: 2022-05-22

## 2022-05-22 PROBLEM — J06.9 UPPER RESPIRATORY INFECTION: Status: RESOLVED | Noted: 2021-08-16 | Resolved: 2022-05-22

## 2022-05-22 PROBLEM — S69.91XA INJURY OF RIGHT THUMB: Status: RESOLVED | Noted: 2020-01-01 | Resolved: 2022-05-22

## 2022-05-22 PROBLEM — F41.9 ANXIETY: Status: ACTIVE | Noted: 2022-05-22

## 2022-05-22 PROBLEM — Z20.2 EXPOSURE TO STD: Status: RESOLVED | Noted: 2020-10-25 | Resolved: 2022-05-22

## 2022-05-22 PROCEDURE — ECARE PR SL URGENT CARE VIRTUAL VISIT: Performed by: PHYSICIAN ASSISTANT

## 2022-05-22 RX ORDER — SIMETHICONE 125 MG
125 CAPSULE ORAL EVERY 6 HOURS PRN
Qty: 28 CAPSULE | Refills: 0 | Status: SHIPPED | OUTPATIENT
Start: 2022-05-22

## 2022-05-22 RX ORDER — SACCHAROMYCES BOULARDII 250 MG
250 CAPSULE ORAL 2 TIMES DAILY
Qty: 60 CAPSULE | Refills: 0 | Status: SHIPPED | OUTPATIENT
Start: 2022-05-22

## 2022-05-22 NOTE — PATIENT INSTRUCTIONS
Call to get your birthday fixed      [phone]  Schedule or Reschedule Outpatient Testing - Option 2  Billing - Option 3  General Info - Option 4  MyChart Help - Option 5  Comprehensive Spine Program - Option 6   COVID - Option 7

## 2022-05-22 NOTE — PROGRESS NOTES
Video Visit - Doris Lamar 32 y o  male MRN: [id number]    REQUIRED DOCUMENTATION:         1  This service was provided via [facility]  2  Provider located at [address] [place] [phone]  3  [facility] provider: Marisabel Osei PA-C   4  Identify all parties in room with patient during [facility] visit:  No one else  5  After connecting through [facility], patient was identified by name and date of birth  Patient was then informed that this was a Telemedicine visit and that the exam was being conducted confidentially over secure lines  My office door was closed  No one else was in the room  Patient acknowledged consent and understanding of privacy and security of the Telemedicine visit  I informed the patient that I have reviewed their record in Epic and presented the opportunity for them to ask any questions regarding the visit today  The patient agreed to participate  VITALS: Heart Rate: 60 BPM, Respiratory Rate: 16 RPM, Temperature Unavailable° F, Blood Pressure Unavailable mmHg, Pulse Ox Unavailable % on RA    HPI  Pt reports 2 weeks ago was in ED for stomach bug  Reports since then having gurgling in stomach  Reports pain intermittently in lower abdomen which feels like a tightness, mild  Feels similar to muscle soreness from doing ab exercises  Denies dysuria, penile discharge, no concern for STD  Reports diarrhea is resolved at this time  Denies recent travel or recent antibiotics  Reports ate an Terry hoagie before sx started 2 weeks ago  No fevers, no blood or mucous in stool  Hasn't been taking anything for sx  Physical Exam  Constitutional:       General: He is not in acute distress  Appearance: Normal appearance  He is not toxic-appearing  HENT:      Head: Normocephalic and atraumatic  Nose: No rhinorrhea        Mouth/Throat:      Mouth: Mucous membranes are moist    Eyes:      Conjunctiva/sclera: Conjunctivae normal    Pulmonary:      Effort: Pulmonary effort is normal  No respiratory distress  Breath sounds: No wheezing (no gross audible wheeze through computer)  Abdominal:      General: Abdomen is flat  Palpations: Abdomen is soft  Tenderness: There is abdominal tenderness (bilateral lower quadrants)  Musculoskeletal:      Cervical back: Normal range of motion  Skin:     Findings: No rash (on face or neck)  Neurological:      Mental Status: He is alert  Cranial Nerves: No dysarthria or facial asymmetry  Psychiatric:         Mood and Affect: Mood normal          Behavior: Behavior normal          Diagnoses and all orders for this visit:    Borborygmi  -     simethicone (Gas-X Extra Strength) 125 MG CAPS; Take 1 capsule (125 mg total) by mouth every 6 (six) hours as needed for flatulence  -     Ambulatory Referral to Gastroenterology; Future  -     saccharomyces boulardii (FLORASTOR) 250 mg capsule; Take 1 capsule (250 mg total) by mouth in the morning and 1 capsule (250 mg total) in the evening  Patient Instructions   Call to get your birthday fixed      [phone]  Schedule or Reschedule Outpatient Testing - Option 2  Billing - Option 3  General Info - Option 4  MyChart Help - Option 5  Comprehensive Spine Program - Option 6   COVID - Option 7

## 2022-05-22 NOTE — CARE ANYWHERE EVISITS
Visit Summary for Abel Carpenter - Gender: Male - Date of Birth: [id number]  Date: [id number] - Duration: 15 minutes  Patient: Abel Carpenter  Provider: Yossi Cormier PA-C    Patient Contact Information  Address  [address] [place] [address]; [place] [address]  [phone]    Visit Topics  Stomachache [Added By: Self - 2022-05-22]    Triage Questions   What is your current physical address in the event of a medical emergency? Answer []  Are you allergic to any medications? Answer []  Are you now or could you be pregnant? Answer []  Do you have any immune system compromise or chronic lung   disease? Answer []  Do you have any vulnerable family members in the home (infant, pregnant, cancer, elderly)? Answer []     Conversation Transcripts  [0A][0A] [Notification] You are connected with Yossi Cormier PA-C, Urgent Care Specialist [0A][Notification] Melvin Richardson is located in [place]  [0A][Notification] Melvin Richardson has shared health history  Mary Gardner  [0A]    Diagnosis  Oth symptoms and signs involving the dgstv sys and abdomen    Procedures  Value: [id number] Code: CPT-4 UNLISTED E&M SERVICE    Medications Prescribed    No prescriptions ordered    Electronically signed by: Nevaeh Stanford(NPI [id number])

## 2022-05-27 ENCOUNTER — OFFICE VISIT (OUTPATIENT)
Dept: GASTROENTEROLOGY | Facility: CLINIC | Age: 27
End: 2022-05-27
Payer: MEDICARE

## 2022-05-27 VITALS
BODY MASS INDEX: 20.09 KG/M2 | SYSTOLIC BLOOD PRESSURE: 120 MMHG | DIASTOLIC BLOOD PRESSURE: 80 MMHG | WEIGHT: 125 LBS | HEART RATE: 101 BPM | HEIGHT: 66 IN | OXYGEN SATURATION: 99 %

## 2022-05-27 DIAGNOSIS — R19.7 DIARRHEA OF PRESUMED INFECTIOUS ORIGIN: Primary | ICD-10-CM

## 2022-05-27 DIAGNOSIS — R19.8 BORBORYGMI: ICD-10-CM

## 2022-05-27 DIAGNOSIS — R11.2 NAUSEA AND VOMITING, UNSPECIFIED VOMITING TYPE: ICD-10-CM

## 2022-05-27 DIAGNOSIS — R10.84 GENERALIZED ABDOMINAL PAIN: ICD-10-CM

## 2022-05-27 PROCEDURE — 99204 OFFICE O/P NEW MOD 45 MIN: CPT | Performed by: INTERNAL MEDICINE

## 2022-05-27 RX ORDER — DICYCLOMINE HYDROCHLORIDE 10 MG/1
10 CAPSULE ORAL 2 TIMES DAILY
COMMUNITY
Start: 2022-05-25

## 2022-05-27 NOTE — PROGRESS NOTES
[facility] - Outpatient Consultation  Merlin Billy 32 y o  male MRN: [id number]  Encounter: [id number]          ASSESSMENT AND PLAN:      1  Diarrhea of presumed infectious origin    2  Generalized abdominal pain    3  Nausea and vomiting, unspecified vomiting type    Due to the improvement regarding the symptoms, there is no indication for performing any additional testing  The patient was reassured that he more than likely had an infectious gastroenteritis or enterocolitis which was probably viral   Due to the 2 day course of antibiotics that he took, I would like to prescribe a probiotic, either align or Florastor, to be taken for the next month    ______________________________________________________________________    HPI:  This 58-year-old male comes the office today with the chief complaint having experiences sudden onset of abdominal pain in association with diarrhea as well as vomiting  There was no hematemesis  He denies fevers or chills  He states that there are multiple members of the household who also had a similar diarrhea illness  The patient saw his primary care physician any also went to the emergency department for this  I reviewed the emergency department note from May 24th 2022  At that time the patient was complaining of abdominal cramping pain as well as gurgling noises  He was also experiencing increased flatulence  Patient had been given a week-long course of an antibiotic of which she took 2 days worth  He was then told by his primary care physician to stop the antibiotic his laboratory work including a CBC and comprehensive metabolic panel was normal   His lipase level was normal   X-ray of the chest and abdomen were unremarkable  Prior to the onset of these symptoms he generally had good gastrointestinal health  He denies ongoing problems with diarrhea or gaseousness or bloating  He denies ongoing problems with nausea vomiting    His symptoms have improved and he is no longer experiencing abdominal pain  He has had no further episodes of vomiting  There was no hematemesis and he denies heartburn or dysphagia  REVIEW OF SYSTEMS:    CONSTITUTIONAL: Denies any fever, chills, rigors, and weight loss  HEENT: No earache or tinnitus  Denies hearing loss or visual disturbances  CARDIOVASCULAR: No chest pain or palpitations  RESPIRATORY: Denies any cough, hemoptysis, shortness of breath or dyspnea on exertion  GASTROINTESTINAL: As noted in the History of Present Illness  GENITOURINARY: No problems with urination  Denies any hematuria or dysuria  NEUROLOGIC: No dizziness or vertigo, denies headaches  MUSCULOSKELETAL: Denies any muscle or joint pain  SKIN: Denies skin rashes or itching  ENDOCRINE: Denies excessive thirst  Denies intolerance to heat or cold  PSYCHOSOCIAL: Denies depression or anxiety  Denies any recent memory loss  Historical Information   Past Medical History:   Diagnosis Date    Psychiatric disorder     anxiety     History reviewed  No pertinent surgical history  Social History   Social History     Substance and Sexual Activity   Alcohol Use Yes    Comment: occ     Social History     Substance and Sexual Activity   Drug Use Never     Social History     Tobacco Use   Smoking Status Current Some Day Smoker    Packs/day: 0 50    Types: Cigarettes   Smokeless Tobacco Never Used   Tobacco Comment    quit a week ago  History reviewed  No pertinent family history  Meds/Allergies       Current Outpatient Medications:     saccharomyces boulardii (FLORASTOR) 250 mg capsule    dicyclomine (BENTYL) 10 mg capsule    simethicone (Gas-X Extra Strength) 125 MG CAPS    Allergies   Allergen Reactions    Amoxicillin     Amoxicillin Hives           Objective     Blood pressure 120/80, pulse 101, height 5' 6" (1 676 m), weight 56 7 kg (125 lb), SpO2 99 %  Body mass index is 20 18 kg/m²          PHYSICAL EXAM:      General Appearance:   Alert, cooperative, no distress   HEENT:   Normocephalic, atraumatic, anicteric      Neck:  Supple, symmetrical, trachea midline   Lungs:   Clear to auscultation bilaterally; no rales, rhonchi or wheezing; respirations unlabored    Heart[de-identified]   Regular rate and rhythm; no murmur, rub, or gallop  Abdomen:   Soft, non-tender, non-distended; normal bowel sounds; no masses, no organomegaly    Genitalia:   Deferred    Rectal:   Deferred    Extremities:  No cyanosis, clubbing or edema    Pulses:  2+ and symmetric    Skin:  No jaundice, rashes, or lesions    Lymph nodes:  No palpable cervical lymphadenopathy        Lab Results:   No visits with results within 1 Day(s) from this visit     Latest known visit with results is:   Admission on 03/09/2022, Discharged on 03/10/2022   Component Date Value    WBC 03/09/2022 9 26     RBC 03/09/2022 5 37     Hemoglobin 03/09/2022 15 8     Hematocrit 03/09/2022 45 7     MCV 03/09/2022 85     MCH 03/09/2022 29 4     MCHC 03/09/2022 34 6     RDW 03/09/2022 13 1     MPV 03/09/2022 9 8     Platelets 37/82/3766 345     nRBC 03/09/2022 0     Neutrophils Relative 03/09/2022 44     Immat GRANS % 03/09/2022 0     Lymphocytes Relative 03/09/2022 44     Monocytes Relative 03/09/2022 7     Eosinophils Relative 03/09/2022 4     Basophils Relative 03/09/2022 1     Neutrophils Absolute 03/09/2022 4 11     Immature Grans Absolute 03/09/2022 0 03     Lymphocytes Absolute 03/09/2022 4 10     Monocytes Absolute 03/09/2022 0 63     Eosinophils Absolute 03/09/2022 0 32     Basophils Absolute 03/09/2022 0 07     Sodium 03/09/2022 138     Potassium 03/09/2022 3 6     Chloride 03/09/2022 101     CO2 03/09/2022 30     ANION GAP 03/09/2022 7     BUN 03/09/2022 7     Creatinine 03/09/2022 0 77     Glucose 03/09/2022 105     Calcium 03/09/2022 8 8     AST 03/09/2022 17     ALT 03/09/2022 26     Alkaline Phosphatase 03/09/2022 108     Total Protein 03/09/2022 7 0  Albumin 03/09/2022 4 0     Total Bilirubin 03/09/2022 0 25     eGFR 03/09/2022 125     Lipase 03/09/2022 104     Color, UA 03/09/2022 Yellow     Clarity, UA 03/09/2022 Clear     Specific Gravity, UA 03/09/2022 1 025     pH, UA 03/09/2022 6 5     Leukocytes, UA 03/09/2022 Negative     Nitrite, UA 03/09/2022 Negative     Protein, UA 03/09/2022 Negative     Glucose, UA 03/09/2022 Negative     Ketones, UA 03/09/2022 Negative     Urobilinogen, UA 03/09/2022 1 0     Bilirubin, UA 03/09/2022 Negative     Blood, UA 03/09/2022 Negative     N gonorrhoeae, DNA Probe 03/09/2022 Negative     Chlamydia trachomatis, D* 03/09/2022 Negative          Radiology Results:   No results found  Answers for HPI/ROS submitted by the patient on 5/27/2022  Chronicity: recurrent  Onset quality: gradual  Frequency: intermittently  Episode duration: 2 hours  Progression since onset: waxing and waning  Pain location: RLQ  Pain - numeric: 3/10  Pain quality: cramping  Radiates to: RUQ, pelvis  anorexia: Yes  arthralgias: No  belching: Yes  constipation: Yes  diarrhea: No  dysuria: No  fever: No  flatus: Yes  frequency: No  headaches: No  hematochezia: No  hematuria: No  melena: No  myalgias: No  nausea:  No  weight loss: No  vomiting: No  Aggravated by: nothing  Relieved by: nothing

## 2022-07-30 ENCOUNTER — APPOINTMENT (EMERGENCY)
Dept: CT IMAGING | Facility: HOSPITAL | Age: 27
End: 2022-07-30
Payer: MEDICARE

## 2022-07-30 ENCOUNTER — HOSPITAL ENCOUNTER (EMERGENCY)
Facility: HOSPITAL | Age: 27
Discharge: HOME | End: 2022-07-30
Attending: EMERGENCY MEDICINE
Payer: MEDICARE

## 2022-07-30 VITALS
TEMPERATURE: 98.7 F | WEIGHT: 125 LBS | SYSTOLIC BLOOD PRESSURE: 122 MMHG | HEIGHT: 66 IN | DIASTOLIC BLOOD PRESSURE: 66 MMHG | BODY MASS INDEX: 20.09 KG/M2 | HEART RATE: 80 BPM | OXYGEN SATURATION: 100 % | RESPIRATION RATE: 18 BRPM

## 2022-07-30 DIAGNOSIS — R10.84 GENERALIZED ABDOMINAL PAIN: ICD-10-CM

## 2022-07-30 DIAGNOSIS — M54.9 MUSCULOSKELETAL BACK PAIN: Primary | ICD-10-CM

## 2022-07-30 PROCEDURE — G1004 CDSM NDSC: HCPCS

## 2022-07-30 PROCEDURE — 99284 EMERGENCY DEPT VISIT MOD MDM: CPT | Performed by: PHYSICIAN ASSISTANT

## 2022-07-30 PROCEDURE — 96372 THER/PROPH/DIAG INJ SC/IM: CPT

## 2022-07-30 PROCEDURE — 74176 CT ABD & PELVIS W/O CONTRAST: CPT

## 2022-07-30 PROCEDURE — 99284 EMERGENCY DEPT VISIT MOD MDM: CPT

## 2022-07-30 RX ORDER — KETOROLAC TROMETHAMINE 30 MG/ML
15 INJECTION, SOLUTION INTRAMUSCULAR; INTRAVENOUS ONCE
Status: COMPLETED | OUTPATIENT
Start: 2022-07-30 | End: 2022-07-30

## 2022-07-30 RX ORDER — METHOCARBAMOL 500 MG/1
500 TABLET, FILM COATED ORAL 2 TIMES DAILY
Qty: 20 TABLET | Refills: 0 | Status: SHIPPED | OUTPATIENT
Start: 2022-07-30

## 2022-07-30 RX ORDER — METHOCARBAMOL 500 MG/1
500 TABLET, FILM COATED ORAL ONCE
Status: COMPLETED | OUTPATIENT
Start: 2022-07-30 | End: 2022-07-30

## 2022-07-30 RX ORDER — LIDOCAINE 50 MG/G
1 PATCH TOPICAL ONCE
Status: DISCONTINUED | OUTPATIENT
Start: 2022-07-30 | End: 2022-07-30 | Stop reason: HOSPADM

## 2022-07-30 RX ADMIN — LIDOCAINE 5% 1 PATCH: 700 PATCH TOPICAL at 02:13

## 2022-07-30 RX ADMIN — METHOCARBAMOL 500 MG: 500 TABLET ORAL at 02:13

## 2022-07-30 RX ADMIN — KETOROLAC TROMETHAMINE 15 MG: 30 INJECTION, SOLUTION INTRAMUSCULAR at 02:13

## 2022-07-30 NOTE — ED PROVIDER NOTES
History  Chief Complaint   Patient presents with    Back Pain     Pt c/o mid back pain that radiates to right rib cage x 2 days, pt also c/o intermittent stomach cramps      Patient is a 30-year-old male with no significant past medical history presenting to the emergency department for evaluation of right-sided thoracic back pain radiating to the right upper quadrant of his abdomen that has been ongoing for the last 2 days  He is also reporting diarrhea  He denies any injury or trauma to the area  He does not note any alleviating or exacerbating factors  He has not taken any medications for pain  Denying any fevers, chills, chest pain, difficulty breathing, vomiting  No other complaints at this time  Prior to Admission Medications   Prescriptions Last Dose Informant Patient Reported? Taking?   dicyclomine (BENTYL) 10 mg capsule   Yes No   Sig: Take 10 mg by mouth 2 (two) times a day   Patient not taking: Reported on 5/27/2022   saccharomyces boulardii (FLORASTOR) 250 mg capsule   No No   Sig: Take 1 capsule (250 mg total) by mouth in the morning and 1 capsule (250 mg total) in the evening  simethicone (Gas-X Extra Strength) 125 MG CAPS   No No   Sig: Take 1 capsule (125 mg total) by mouth every 6 (six) hours as needed for flatulence   Patient not taking: Reported on 5/27/2022      Facility-Administered Medications: None       Past Medical History:   Diagnosis Date    Psychiatric disorder     anxiety       History reviewed  No pertinent surgical history  History reviewed  No pertinent family history  I have reviewed and agree with the history as documented      E-Cigarette/Vaping    E-Cigarette Use Never User      E-Cigarette/Vaping Substances    Nicotine No     THC No     CBD No     Flavoring No     Other No     Unknown No      Social History     Tobacco Use    Smoking status: Current Some Day Smoker     Packs/day: 0 50     Types: Cigarettes    Smokeless tobacco: Never Used   Johanna Lozano Tobacco comment: quit a week ago  Vaping Use    Vaping Use: Never used   Substance Use Topics    Alcohol use: Yes     Comment: occ    Drug use: Never       Review of Systems   Constitutional: Negative for chills, diaphoresis and fever  HENT: Negative for congestion, drooling, facial swelling, nosebleeds, sore throat and voice change  Eyes: Negative for discharge and redness  Respiratory: Negative for cough, choking, chest tightness, shortness of breath and stridor  Cardiovascular: Negative for chest pain and palpitations  Gastrointestinal: Positive for abdominal pain and diarrhea  Negative for nausea and vomiting  Genitourinary: Negative for decreased urine volume, difficulty urinating, dysuria, flank pain, frequency and urgency  Musculoskeletal: Positive for back pain  Negative for arthralgias, neck pain and neck stiffness  Skin: Negative for color change, rash and wound  Neurological: Negative for dizziness, syncope, facial asymmetry, weakness, light-headedness, numbness and headaches  Psychiatric/Behavioral: Negative for confusion and suicidal ideas  The patient is not nervous/anxious  All other systems reviewed and are negative  Physical Exam  Physical Exam  Vitals reviewed  Constitutional:       General: He is not in acute distress  Appearance: Normal appearance  He is normal weight  He is not ill-appearing, toxic-appearing or diaphoretic  HENT:      Head: Normocephalic and atraumatic  Right Ear: External ear normal       Left Ear: External ear normal       Mouth/Throat:      Mouth: Mucous membranes are moist       Pharynx: Oropharynx is clear  No oropharyngeal exudate or posterior oropharyngeal erythema  Eyes:      General: No scleral icterus  Right eye: No discharge  Left eye: No discharge  Extraocular Movements: Extraocular movements intact        Conjunctiva/sclera: Conjunctivae normal    Cardiovascular:      Rate and Rhythm: Normal rate and regular rhythm  Pulses: Normal pulses  Heart sounds: Normal heart sounds  No murmur heard  No friction rub  No gallop  Pulmonary:      Effort: Pulmonary effort is normal  No respiratory distress  Breath sounds: Normal breath sounds  No stridor  No wheezing, rhonchi or rales  Abdominal:      General: Abdomen is flat  Palpations: Abdomen is soft  Tenderness: There is no abdominal tenderness  There is no right CVA tenderness, left CVA tenderness, guarding or rebound  Comments: No abdominal tenderness despite just complaint of abdominal pain  Del Valle sign negative  Musculoskeletal:         General: Normal range of motion  Cervical back: Normal range of motion and neck supple  Thoracic back: No signs of trauma, spasms, tenderness or bony tenderness  Normal range of motion  Right lower leg: No edema  Left lower leg: No edema  Comments: No thoracic back tenderness despite subjective complaint of pain  No midline tenderness, step-offs, deformities throughout the entirety of the spinal column  Skin:     General: Skin is warm and dry  Capillary Refill: Capillary refill takes less than 2 seconds  Neurological:      General: No focal deficit present  Mental Status: He is alert and oriented to person, place, and time     Psychiatric:         Mood and Affect: Mood normal          Behavior: Behavior normal          Vital Signs  ED Triage Vitals   Temperature Pulse Respirations Blood Pressure SpO2   07/30/22 0016 07/30/22 0016 07/30/22 0016 07/30/22 0016 07/30/22 0016   98 7 °F (37 1 °C) 87 17 109/65 99 %      Temp Source Heart Rate Source Patient Position - Orthostatic VS BP Location FiO2 (%)   07/30/22 0016 07/30/22 0016 07/30/22 0016 07/30/22 0016 --   Oral Monitor Sitting Left arm       Pain Score       07/30/22 0213       4           Vitals:    07/30/22 0016 07/30/22 0200   BP: 109/65 122/66   Pulse: 87 80   Patient Position - Orthostatic VS: Sitting Sitting         Visual Acuity      ED Medications  Medications   lidocaine (LIDODERM) 5 % patch 1 patch (1 patch Topical Medication Applied 7/30/22 0213)   ketorolac (TORADOL) injection 15 mg (15 mg Intramuscular Given 7/30/22 0213)   methocarbamol (ROBAXIN) tablet 500 mg (500 mg Oral Given 7/30/22 0213)       Diagnostic Studies  Results Reviewed     None                 CT abdomen pelvis wo contrast    (Results Pending)              Procedures  Procedures         ED Course  ED Course as of 07/30/22 0339   Sat Jul 30, 2022   2803 Patient requesting to leave prior to CT results  Patient feeling completely improved after Toradol, Robaxin, Lidoderm patch  Vital signs stable  Will discharge patient with strict return precautions  SBIRT 22yo+    Flowsheet Row Most Recent Value   SBIRT (25 yo +)    In order to provide better care to our patients, we are screening all of our patients for alcohol and drug use  Would it be okay to ask you these screening questions? Yes Filed at: 07/30/2022 0143   Initial Alcohol Screen: US AUDIT-C     1  How often do you have a drink containing alcohol? 0 Filed at: 07/30/2022 0143   2  How many drinks containing alcohol do you have on a typical day you are drinking? 0 Filed at: 07/30/2022 0143   3a  Male UNDER 65: How often do you have five or more drinks on one occasion? 0 Filed at: 07/30/2022 0143   3b  FEMALE Any Age, or MALE 65+: How often do you have 4 or more drinks on one occassion? 0 Filed at: 07/30/2022 0143   Audit-C Score 0 Filed at: 07/30/2022 4700   JAMES: How many times in the past year have you    Used an illegal drug or used a prescription medication for non-medical reasons?  Never Filed at: 07/30/2022 0143                    MDM  Number of Diagnoses or Management Options  Generalized abdominal pain  Musculoskeletal back pain  Diagnosis management comments: Patient presented to the emergency department for evaluation of thoracic back pain and generalized abdominal pain for the last 2 days  He appears comfortable  He is not any acute distress  Vital signs unremarkable  He does not have any thoracic or abdominal tenderness on exam suspected active complaint of pain  Felt completely resolved after Toradol, Robaxin, Lidoderm patch  Patient requested discharge prior to CT results  Given stable hemodynamics, complete resolution of symptoms will discharge home with strict return precautions  Patient is in stable condition at time of discharge  Amount and/or Complexity of Data Reviewed  Tests in the radiology section of CPT®: ordered and reviewed    Patient Progress  Patient progress: stable      Disposition  Final diagnoses:   Musculoskeletal back pain   Generalized abdominal pain     Time reflects when diagnosis was documented in both MDM as applicable and the Disposition within this note     Time User Action Codes Description Comment    7/30/2022  3:32 AM Doris Otto Add [M54 9] Musculoskeletal back pain     7/30/2022  3:32 AM Doris Otto Add [R10 84] Generalized abdominal pain       ED Disposition     ED Disposition   Discharge    Condition   Stable    Date/Time   Sat Jul 30, 2022  3:32 AM    Comment   Naveed Santillan discharge to home/self care                 Follow-up Information     Follow up With Specialties Details Why Contact Info Additional [facility] Emergency Department Emergency Medicine Go to  If symptoms worsen [address] Emergency Department, [address] [place] [address], [facility] Tan Peters MD Family Medicine Schedule an appointment as soon as possible for a visit  For follow up [address] [place] [address]  [phone]             Patient's Medications   Discharge Prescriptions    METHOCARBAMOL (ROBAXIN) 500 MG TABLET    Take 1 tablet (500 mg total) by mouth 2 (two) times a day       Start Date: 7/30/2022 End Date: --       Order Dose: 500 mg       Quantity: 20 tablet    Refills: 0       No discharge procedures on file      PDMP Review     None          ED Provider  Electronically Signed by           Byron Gallo PA-C  07/30/22 0858

## 2022-07-30 NOTE — ED NOTES
Patient transported to [address] [place] [address], [address] [place] [address]  07/30/22 4516

## 2022-10-13 ENCOUNTER — APPOINTMENT (OUTPATIENT)
Dept: RADIOLOGY | Facility: CLINIC | Age: 27
End: 2022-10-13
Payer: MEDICARE

## 2022-10-13 ENCOUNTER — OFFICE VISIT (OUTPATIENT)
Dept: URGENT CARE | Facility: CLINIC | Age: 27
End: 2022-10-13
Payer: MEDICARE

## 2022-10-13 VITALS — TEMPERATURE: 97 F | OXYGEN SATURATION: 99 % | RESPIRATION RATE: 18 BRPM | HEART RATE: 56 BPM

## 2022-10-13 DIAGNOSIS — M54.6 ACUTE MIDLINE THORACIC BACK PAIN: ICD-10-CM

## 2022-10-13 DIAGNOSIS — M54.6 ACUTE MIDLINE THORACIC BACK PAIN: Primary | ICD-10-CM

## 2022-10-13 PROCEDURE — G0463 HOSPITAL OUTPT CLINIC VISIT: HCPCS | Performed by: EMERGENCY MEDICINE

## 2022-10-13 PROCEDURE — 99213 OFFICE O/P EST LOW 20 MIN: CPT | Performed by: EMERGENCY MEDICINE

## 2022-10-13 PROCEDURE — 72072 X-RAY EXAM THORAC SPINE 3VWS: CPT

## 2022-10-13 RX ORDER — IBUPROFEN 600 MG/1
TABLET ORAL
Qty: 21 TABLET | Refills: 0 | Status: SHIPPED | OUTPATIENT
Start: 2022-10-13

## 2022-10-13 RX ORDER — BUSPIRONE HYDROCHLORIDE 5 MG/1
TABLET ORAL
COMMUNITY
Start: 2022-09-29

## 2022-10-13 NOTE — PROGRESS NOTES
[facility]        NAME: Onel Givens is a 32 y o  male  : 1995    MRN: [id number]  DATE: 2022  TIME: 12:21 PM    Assessment and Plan   Acute midline thoracic back pain [M54 6]  1  Acute midline thoracic back pain  XR spine thoracic 3 vw    ibuprofen (MOTRIN) 600 mg tablet         Patient Instructions   Patient Instructions   1  ICe x 3 days, 20 min at a time 3-4 X /DAY  2  No heavy lifting x 7 days  3  F/u with Ortho in 2-3 da;ys      Spoke with Radiologist - no evidence of fracture; there are old sclerotic changes present since 2019  Follow up with PCP in 3-5 days  Proceed to  ER if symptoms worsen  Chief Complaint     Chief Complaint   Patient presents with   • Back Pain     Patient here with upper middle back pain  Patient states he was pro 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 and got slammed to the ground , and since then his back has been very sore , so he just wants to be seen to make sure everything is okay  History of Present Illness       77-year-old white male with chief complaint of mid upper back pain  Patient states he was pro 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 and got slammed to the hard floor  Patient denies any numbness or tingling down his arms or legs  Review of Systems   Review of Systems   Constitutional: Negative for chills and fever  HENT: Negative for congestion and rhinorrhea  Eyes: Negative for discharge and visual disturbance  Respiratory: Negative for shortness of breath and wheezing  Cardiovascular: Negative for chest pain and palpitations  Gastrointestinal: Negative for abdominal pain and vomiting  Endocrine: Negative for polydipsia and polyuria  Genitourinary: Negative for dysuria and hematuria  Musculoskeletal: Positive for back pain  Negative for arthralgias, gait problem and neck stiffness  Skin: Negative for rash and wound  Neurological: Negative for dizziness and headaches     Psychiatric/Behavioral: Negative for confusion and suicidal ideas  Current Medications       Current Outpatient Medications:   •  busPIRone (BUSPAR) 5 mg tablet, , Disp: , Rfl:   •  ibuprofen (MOTRIN) 600 mg tablet, Take 1 tab with food every 6 hours for pain, Disp: 21 tablet, Rfl: 0  •  dicyclomine (BENTYL) 10 mg capsule, Take 10 mg by mouth 2 (two) times a day (Patient not taking: No sig reported), Disp: , Rfl:   •  methocarbamol (ROBAXIN) 500 mg tablet, Take 1 tablet (500 mg total) by mouth 2 (two) times a day (Patient not taking: Reported on 10/13/2022), Disp: 20 tablet, Rfl: 0  •  saccharomyces boulardii (FLORASTOR) 250 mg capsule, Take 1 capsule (250 mg total) by mouth in the morning and 1 capsule (250 mg total) in the evening  (Patient not taking: Reported on 10/13/2022), Disp: 60 capsule, Rfl: 0  •  simethicone (Gas-X Extra Strength) 125 MG CAPS, Take 1 capsule (125 mg total) by mouth every 6 (six) hours as needed for flatulence (Patient not taking: No sig reported), Disp: 28 capsule, Rfl: 0    Current Allergies     Allergies as of 10/13/2022 - Reviewed 10/13/2022   Allergen Reaction Noted   • Amoxicillin  04/30/2018   • Amoxicillin Hives 02/19/2020            The following portions of the patient's history were reviewed and updated as appropriate: allergies, current medications, past family history, past medical history, past social history, past surgical history and problem list      Past Medical History:   Diagnosis Date   • Psychiatric disorder     anxiety       History reviewed  No pertinent surgical history  History reviewed  No pertinent family history  Medications have been verified  Objective   Pulse 56   Temp (!) 97 °F (36 1 °C)   Resp 18   SpO2 99%        Physical Exam     Physical Exam  Vitals and nursing note reviewed  Constitutional:       Appearance: Normal appearance  Comments: 63-year-old male sitting on the stretcher complaining of mid back pain   HENT:      Head: Normocephalic and atraumatic        Nose: Nose normal    Eyes:      Extraocular Movements: Extraocular movements intact  Conjunctiva/sclera: Conjunctivae normal    Cardiovascular:      Rate and Rhythm: Normal rate  Pulmonary:      Effort: Pulmonary effort is normal    Musculoskeletal:         General: Tenderness present  Normal range of motion  Cervical back: Neck supple  Comments: Back:  There is some tenderness to the midthoracic region of the abdomen some fullness of the parathoracic muscles on the right there is no cervical or lumbar tenderness  Straight leg raising is negative on both sides  Skin:     General: Skin is warm and dry  Neurological:      General: No focal deficit present  Mental Status: He is alert and oriented to person, place, and time     Psychiatric:         Mood and Affect: Mood normal

## 2022-11-07 ENCOUNTER — OFFICE VISIT (OUTPATIENT)
Dept: URGENT CARE | Facility: CLINIC | Age: 27
End: 2022-11-07

## 2022-11-07 VITALS
SYSTOLIC BLOOD PRESSURE: 105 MMHG | BODY MASS INDEX: 20.89 KG/M2 | WEIGHT: 130 LBS | TEMPERATURE: 97.3 F | HEART RATE: 91 BPM | HEIGHT: 66 IN | RESPIRATION RATE: 18 BRPM | DIASTOLIC BLOOD PRESSURE: 82 MMHG | OXYGEN SATURATION: 98 %

## 2022-11-07 DIAGNOSIS — J01.90 ACUTE SINUSITIS, RECURRENCE NOT SPECIFIED, UNSPECIFIED LOCATION: Primary | ICD-10-CM

## 2022-11-07 RX ORDER — AZITHROMYCIN 250 MG/1
TABLET, FILM COATED ORAL
Qty: 6 TABLET | Refills: 0 | Status: SHIPPED | OUTPATIENT
Start: 2022-11-07 | End: 2022-11-11

## 2022-11-07 RX ORDER — PREDNISONE 20 MG/1
20 TABLET ORAL DAILY
Qty: 6 TABLET | Refills: 0 | Status: SHIPPED | OUTPATIENT
Start: 2022-11-07 | End: 2022-11-13

## 2022-11-07 NOTE — PROGRESS NOTES
[facility]        NAME: Janak Yeboah is a 32 y o  male  : 1995    MRN: [id number]  DATE: 2022  TIME: 5:20 PM    Assessment and Plan   Acute sinusitis, recurrence not specified, unspecified location [J01 90]  1  Acute sinusitis, recurrence not specified, unspecified location  azithromycin (ZITHROMAX) 250 mg tablet    predniSONE 20 mg tablet         Patient Instructions       Follow up with PCP in 3-5 days  Proceed to  ER if symptoms worsen  Chief Complaint     Chief Complaint   Patient presents with   • Facial Pain     Pt c/o sinus pain, b/l ear pain and a cough for 3-4 days  Pt swabbed self for covid yesterday - negative  History of Present Illness       Patient is a 33 yo male who presents with a cc of sinus pain/pressure x 5 days  Also has some cough, nasal congestion, PND  Home Covid test negative  Has been taking OTC meds with mild improvement  No fevers, headaches, dizziness  Review of Systems   Review of Systems   Constitutional: Negative for fever  HENT: Positive for congestion, postnasal drip, sinus pressure and sinus pain  Negative for ear pain and sore throat  Respiratory: Positive for cough  Negative for shortness of breath  Cardiovascular: Negative for chest pain  Neurological: Negative for dizziness and headaches           Current Medications       Current Outpatient Medications:   •  azithromycin (ZITHROMAX) 250 mg tablet, Take 2 tablets today then 1 tablet daily x 4 days, Disp: 6 tablet, Rfl: 0  •  busPIRone (BUSPAR) 5 mg tablet, , Disp: , Rfl:   •  predniSONE 20 mg tablet, Take 1 tablet (20 mg total) by mouth daily for 6 days, Disp: 6 tablet, Rfl: 0  •  dicyclomine (BENTYL) 10 mg capsule, Take 10 mg by mouth 2 (two) times a day (Patient not taking: No sig reported), Disp: , Rfl:   •  ibuprofen (MOTRIN) 600 mg tablet, Take 1 tab with food every 6 hours for pain (Patient not taking: Reported on 2022), Disp: 21 tablet, Rfl: 0  • methocarbamol (ROBAXIN) 500 mg tablet, Take 1 tablet (500 mg total) by mouth 2 (two) times a day (Patient not taking: No sig reported), Disp: 20 tablet, Rfl: 0  •  saccharomyces boulardii (FLORASTOR) 250 mg capsule, Take 1 capsule (250 mg total) by mouth in the morning and 1 capsule (250 mg total) in the evening  (Patient not taking: No sig reported), Disp: 60 capsule, Rfl: 0  •  simethicone (Gas-X Extra Strength) 125 MG CAPS, Take 1 capsule (125 mg total) by mouth every 6 (six) hours as needed for flatulence (Patient not taking: No sig reported), Disp: 28 capsule, Rfl: 0    Current Allergies     Allergies as of 11/07/2022 - Reviewed 11/07/2022   Allergen Reaction Noted   • Amoxicillin  04/30/2018   • Amoxicillin Hives 02/19/2020            The following portions of the patient's history were reviewed and updated as appropriate: allergies, current medications, past family history, past medical history, past social history, past surgical history and problem list      Past Medical History:   Diagnosis Date   • Psychiatric disorder     anxiety       History reviewed  No pertinent surgical history  History reviewed  No pertinent family history  Medications have been verified  Objective   /82   Pulse 91   Temp (!) 97 3 °F (36 3 °C)   Resp 18   Ht 5' 6" (1 676 m)   Wt 59 kg (130 lb)   SpO2 98%   BMI 20 98 kg/m²        Physical Exam     Physical Exam  Constitutional:       General: He is not in acute distress  Appearance: Normal appearance  He is not ill-appearing or diaphoretic  HENT:      Right Ear: Tympanic membrane, ear canal and external ear normal       Left Ear: Tympanic membrane, ear canal and external ear normal       Nose:      Right Sinus: Maxillary sinus tenderness and frontal sinus tenderness present  Left Sinus: Maxillary sinus tenderness and frontal sinus tenderness present  Mouth/Throat:      Mouth: Mucous membranes are moist       Pharynx: Oropharynx is clear  Eyes:      Conjunctiva/sclera: Conjunctivae normal    Cardiovascular:      Rate and Rhythm: Normal rate and regular rhythm  Heart sounds: Normal heart sounds  Pulmonary:      Effort: Pulmonary effort is normal       Breath sounds: Normal breath sounds  Skin:     General: Skin is warm and dry  Neurological:      Mental Status: He is alert     Psychiatric:         Mood and Affect: Mood normal          Behavior: Behavior normal

## 2022-11-24 ENCOUNTER — AMB VIDEO VISIT (OUTPATIENT)
Dept: OTHER | Facility: HOSPITAL | Age: 27
End: 2022-11-24

## 2022-11-24 DIAGNOSIS — R10.10 UPPER ABDOMINAL PAIN: Primary | ICD-10-CM

## 2022-11-24 DIAGNOSIS — R07.81 RIB PAIN ON RIGHT SIDE: ICD-10-CM

## 2022-11-24 RX ORDER — DICYCLOMINE HYDROCHLORIDE 10 MG/1
10 CAPSULE ORAL 2 TIMES DAILY
Qty: 10 CAPSULE | Refills: 0 | Status: SHIPPED | OUTPATIENT
Start: 2022-11-24

## 2022-11-24 RX ORDER — SIMETHICONE 125 MG
125 CAPSULE ORAL EVERY 6 HOURS PRN
Qty: 28 CAPSULE | Refills: 0 | Status: SHIPPED | OUTPATIENT
Start: 2022-11-24

## 2022-11-24 NOTE — PROGRESS NOTES
Video Visit - Amina Fowler 32 y o  male MRN: [id number]    REQUIRED DOCUMENTATION:         1  This service was provided via [facility]  2  Provider located at [address] [place] [address] Ike Dougherty [id number] [phone]  3  [facility] provider: Steph Hopper PA-C   4  Identify all parties in room with patient during [facility] visit:  No one else  5  After connecting through [employer], patient was identified by name and date of birth  Patient was then informed that this was a Telemedicine visit and that the exam was being conducted confidentially over secure lines  My office door was closed  No one else was in the room  Patient acknowledged consent and understanding of privacy and security of the Telemedicine visit  I informed the patient that I have reviewed their record in Epic and presented the opportunity for them to ask any questions regarding the visit today  The patient agreed to participate  VITALS: Heart Rate: 76 BPM, Respiratory Rate: 18 RPM, Temperature Unavailable° F, Blood Pressure Unavailable mmHg, Pulse Ox 98 % on RA    HPI  Pt reports 3 days of intermittent pain mostly in RUQ under rib- cramping like he did sit-ups, and also pain in middle back and upper abdomen on both sides  Notes he has been exercising lately  Nothing improves pain  After eating pain may get slightly worse, nausea, burping a lot and and cramping in upper abdomen bilaterally  Took pepto, but seemed to make sx worse  Reports BM is loose to diarrhea  No diarrhea  No hematuria, painful urination  No rash, no fevers, vomiting  Reports he was able to jump up and down without worsening pain in his abdomen  Nothing change tonight that made him call in, pain just hasn't resolved  Had CT in July which showed multiple sub centimeter stones bilateral kidneys, unremarkable vasculature  No gallstones   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 month showed lower thoracic spine arthritis   Physical Exam  Constitutional:       General: He is not in acute distress  Appearance: Normal appearance  He is not ill-appearing or toxic-appearing  HENT:      Head: Normocephalic and atraumatic  Nose: No rhinorrhea  Mouth/Throat:      Mouth: Mucous membranes are moist    Eyes:      Conjunctiva/sclera: Conjunctivae normal    Cardiovascular:      Rate and Rhythm: Normal rate  Pulmonary:      Effort: Pulmonary effort is normal  No respiratory distress  Breath sounds: No wheezing (no gross audible wheeze through computer)  Abdominal:      General: Abdomen is flat  Musculoskeletal:      Cervical back: Normal range of motion  Skin:     Findings: No rash (on face or neck)  Neurological:      Mental Status: He is alert  Cranial Nerves: No dysarthria or facial asymmetry  Psychiatric:         Mood and Affect: Mood normal          Behavior: Behavior normal          Diagnoses and all orders for this visit:    Upper abdominal pain  -     dicyclomine (BENTYL) 10 mg capsule; Take 1 capsule (10 mg total) by mouth 2 (two) times a day  -     simethicone (Gas-X Extra Strength) 125 MG CAPS; Take 1 capsule (125 mg total) by mouth every 6 (six) hours as needed for flatulence    Rib pain on right side      Patient Instructions   Try ibuprofen 600 mg for pain in R lower ribs  If improvement, likely musculoskeletal as suspected  If no improvement, try bentyl for stomach cramping and simethicone for gas pains  Go to ER for worsening pain, fevers, vomiting, pain that moves to RLQ, or anything else that is concerning  Otherwise follow-up with your PCP for no improvement in the next few days

## 2022-11-24 NOTE — CARE ANYWHERE EVISITS
Visit Summary for William Carpenter - Gender: Male - Date of Birth: [id number]  Date: [id number] - Duration: 14 minutes  Patient: William Carpenter  Provider: Abner Figueroa PA-C    Patient Contact Information  Address  [address] [place] [address]; [place] [id number]  [phone]    Visit Topics  Stomachache [Added By: Self - 2022-11-24]  Pain [Added By: Self - 2022-11-24]    Triage Questions   What is your current physical address in the event of a medical emergency? Answer []  Are you allergic to any medications? Answer []  Are you now or could you be pregnant? Answer []  Do you have any immune system compromise or chronic lung   disease? Answer []  Do you have any vulnerable family members in the home (infant, pregnant, cancer, elderly)? Answer []     Conversation Transcripts  [0A][0A] [Notification] You are connected with Abner Figueroa PA-C, Urgent Care Specialist [0A][Notification] Tanja Austin is located in [place]  [0A][Notification] Tanja Austin has shared health history  Humza Scott  [0A]    Diagnosis  Upper abdominal pain, unspecified  Pleurodynia    Procedures  Value: [id number] Code: CPT-4 UNLISTED E&M SERVICE    Medications Prescribed    No prescriptions ordered    Electronically signed by: Nevaeh Guzman(NPI [id number])

## 2022-11-24 NOTE — PATIENT INSTRUCTIONS
Try ibuprofen 600 mg for pain in R lower ribs  If improvement, likely musculoskeletal as suspected  If no improvement, try bentyl for stomach cramping and simethicone for gas pains  Go to ER for worsening pain, fevers, vomiting, pain that moves to RLQ, or anything else that is concerning  Otherwise follow-up with your PCP for no improvement in the next few days

## 2022-12-19 ENCOUNTER — OFFICE VISIT (OUTPATIENT)
Dept: URGENT CARE | Facility: CLINIC | Age: 27
End: 2022-12-19

## 2022-12-19 VITALS
HEART RATE: 81 BPM | DIASTOLIC BLOOD PRESSURE: 73 MMHG | WEIGHT: 132 LBS | RESPIRATION RATE: 16 BRPM | SYSTOLIC BLOOD PRESSURE: 115 MMHG | HEIGHT: 66 IN | TEMPERATURE: 98 F | BODY MASS INDEX: 21.21 KG/M2 | OXYGEN SATURATION: 99 %

## 2022-12-19 DIAGNOSIS — K04.7 DENTAL INFECTION: Primary | ICD-10-CM

## 2022-12-19 DIAGNOSIS — M26.609 TMJ DISEASE: ICD-10-CM

## 2022-12-19 RX ORDER — CLINDAMYCIN HYDROCHLORIDE 300 MG/1
300 CAPSULE ORAL 3 TIMES DAILY
Qty: 15 CAPSULE | Refills: 0 | Status: SHIPPED | OUTPATIENT
Start: 2022-12-19 | End: 2022-12-24

## 2022-12-19 RX ORDER — NAPROXEN 500 MG/1
500 TABLET ORAL 2 TIMES DAILY WITH MEALS
Qty: 30 TABLET | Refills: 0 | Status: SHIPPED | OUTPATIENT
Start: 2022-12-19

## 2022-12-19 NOTE — PROGRESS NOTES
[facility]        NAME: Manuel Guevara is a 32 y o  male  : 1995    MRN: [id number]  DATE: 2022  TIME: 7:06 PM    Assessment and Plan   Dental infection [K04 7]  1  Dental infection  clindamycin (CLEOCIN) 300 MG capsule    naproxen (Naprosyn) 500 mg tablet      2  TMJ disease  naproxen (Naprosyn) 500 mg tablet        - Clindamycin and Naproxen for acute dental infection  Advised follow up with dentist   - Recommended follow up with dentist for evaluation for mouthguard or PT for possibly worsening TMJ     Patient Instructions       Follow up with PCP in 3-5 days  Proceed to  ER if symptoms worsen  Chief Complaint     Chief Complaint   Patient presents with   • Jaw Pain     Jaw pain, TMJ pain 2 days tylenol, advil          History of Present Illness       Patient is a 33 yo male who presents with a cc of right upper jaw pain radiating up towards the ear x 2 days  Pain is worse with chewing  Patient reports history of poor dentition and prior dental infections  Also reports history of TMJ  He also states that he chipped a tooth on the right upper back side a few days ago  Has been taking Tylenol with some relief  Denies fevers, chills, hearing loss, drainage from the ear, pain or swelling behind the ear, headaches, dizziness  No dysphagia, drooling  Review of Systems   Review of Systems   Constitutional: Negative for chills and fever  HENT: Positive for dental problem and ear pain  Negative for drooling, ear discharge, facial swelling, hearing loss, trouble swallowing and voice change  Respiratory: Negative for shortness of breath  Neurological: Negative for dizziness and headaches           Current Medications       Current Outpatient Medications:   •  busPIRone (BUSPAR) 5 mg tablet, , Disp: , Rfl:   •  clindamycin (CLEOCIN) 300 MG capsule, Take 1 capsule (300 mg total) by mouth 3 (three) times a day for 5 days, Disp: 15 capsule, Rfl: 0  •  naproxen (Naprosyn) 500 mg tablet, Take 1 tablet (500 mg total) by mouth 2 (two) times a day with meals, Disp: 30 tablet, Rfl: 0  •  simethicone (Gas-X Extra Strength) 125 MG CAPS, Take 1 capsule (125 mg total) by mouth every 6 (six) hours as needed for flatulence, Disp: 28 capsule, Rfl: 0  •  dicyclomine (BENTYL) 10 mg capsule, Take 1 capsule (10 mg total) by mouth 2 (two) times a day (Patient not taking: Reported on 12/19/2022), Disp: 10 capsule, Rfl: 0    Current Allergies     Allergies as of 12/19/2022 - Reviewed 12/19/2022   Allergen Reaction Noted   • Amoxicillin  04/30/2018   • Amoxicillin Hives 02/19/2020            The following portions of the patient's history were reviewed and updated as appropriate: allergies, current medications, past family history, past medical history, past social history, past surgical history and problem list      Past Medical History:   Diagnosis Date   • Psychiatric disorder     anxiety       History reviewed  No pertinent surgical history  History reviewed  No pertinent family history  Medications have been verified  Objective   /73   Pulse 81   Temp 98 °F (36 7 °C)   Resp 16   Ht 5' 6" (1 676 m)   Wt 59 9 kg (132 lb)   SpO2 99%   BMI 21 31 kg/m²        Physical Exam     Physical Exam  Constitutional:       General: He is not in acute distress  HENT:      Right Ear: Tympanic membrane, ear canal and external ear normal       Left Ear: Tympanic membrane, ear canal and external ear normal       Mouth/Throat:      Comments: Poor dentition and dental caries noted  Right upper posterior molar noted to be chipped with redness and swelling of gum line  Clicking of TMJ noted with mouth opening  No obvious dislocation or translocation   Cardiovascular:      Rate and Rhythm: Normal rate  Heart sounds: Normal heart sounds  Pulmonary:      Effort: Pulmonary effort is normal       Breath sounds: Normal breath sounds  Skin:     General: Skin is warm and dry     Neurological: Mental Status: He is alert

## 2022-12-20 ENCOUNTER — OFFICE VISIT (OUTPATIENT)
Dept: URGENT CARE | Facility: CLINIC | Age: 27
End: 2022-12-20

## 2022-12-20 VITALS — OXYGEN SATURATION: 98 % | TEMPERATURE: 97 F | HEART RATE: 84 BPM | RESPIRATION RATE: 18 BRPM

## 2022-12-20 DIAGNOSIS — L73.9 FOLLICULITIS: Primary | ICD-10-CM

## 2022-12-20 NOTE — PATIENT INSTRUCTIONS
Folliculitis   WHAT YOU NEED TO KNOW:   Folliculitis is inflammation of your hair follicles  A hair follicle is a sac under your skin  Your hair grows out of the follicle  Folliculitis is caused by bacteria or fungus, most commonly a germ called Staph  Folliculitis can occur anywhere you have hair  DISCHARGE INSTRUCTIONS:   Medicines:   Antibiotics: This medicine is given to fight or prevent an infection caused by bacteria  It may be given as an ointment that you apply to your skin or as a pill  Always take your antibiotics exactly as ordered by your healthcare provider  Never save antibiotics or take leftover antibiotics that were given to you for another illness  Antifungal medicine: This medicine helps kill fungus that may be causing your folliculitis  It may be given as an cream that you apply to your skin or as a pill  NSAIDs , such as ibuprofen, help decrease swelling, pain, and fever  This medicine is available with or without a doctor's order  NSAIDs can cause stomach bleeding or kidney problems in certain people  If you take blood thinner medicine, always ask if NSAIDs are safe for you  Always read the medicine label and follow directions  Do not give these medicines to children under 10months of age without direction from your child's healthcare provider  Antihistamines: This medicine may be given to help decrease itching  Take your medicine as directed  Contact your healthcare provider if you think your medicine is not helping or if you have side effects  Tell him of her if you are allergic to any medicine  Keep a list of the medicines, vitamins, and herbs you take  Include the amounts, and when and why you take them  Bring the list or the pill bottles to follow-up visits  Carry your medicine list with you in case of an emergency  Follow up with your healthcare provider or dermatologist as directed:  Write down your questions so you remember to ask them during your visits    Manage folliculitis:   Use warm compresses:  Wet a washcloth with warm water and apply it to the infected skin area to help decrease pain and swelling  Warm compresses may also help drain pus and improve healing  Clean the area:  Use antibacterial soap to wash the affected area  Change your washcloths and towels every day  Avoid shaving the area: If possible, do not shave areas that have folliculitis  If you must shave, use an electric razor or new blade every time you shave  Prevent folliculitis:   Do not share personal items:  Do not share towels, soap, or any personal items with other people  Do not wear tight clothing:  Do not wear tight-fitting clothes that rub against and irritate your skin  Treat skin injuries right away:  Treat injuries such as cuts and scrapes right away  Wash them with warm, soapy water, and cover the area to prevent infection  Contact your healthcare provider or dermatologist if:  You have a fever  You have foul-smelling pus coming from the bumps on your skin  Your rash is spreading  You have questions or concerns about your condition or care  Return to the emergency department if:  You develop large areas of red, warm, tender skin around the folliculitis  You develop boils  © Copyright [address] 2022 Information is for End User's use only and may not be sold, redistributed or otherwise used for commercial purposes  All illustrations and images included in CareNotes® are the copyrighted property of [employer]  or [employer]  The above information is an  only  It is not intended as medical advice for individual conditions or treatments  Talk to your doctor, nurse or pharmacist before following any medical regimen to see if it is safe and effective for you

## 2022-12-20 NOTE — PROGRESS NOTES
[facility]        NAME: Fidel Herron is a 32 y o  male  : 1995    MRN: [id number]  DATE: 2022  TIME: 10:43 AM    Assessment and Plan   Folliculitis [G59 4]  1  Folliculitis              Patient Instructions     Warm compresses locally three time daily until done  Continue your ABX Cleocin as directed  Follow up with your PCP or Dermatology if persistent or worsen  Chief Complaint     Chief Complaint   Patient presents with   • Abscess     Patient here with a possible abscess or in-grown hair in his groin area  Patient states it is painful  Patient was unable to get into PCP or dermatologist, so they told him to come here to be seen  History of Present Illness       33 yo male with c/o right inguinal mass x 2 days  Was seen yesterday here at this [facility] for right tooth pain and placed on Cleocin 300mg  Pt  Report squeezing the pimple and having a clear liquid like substance discharge  Pt  Reports he trims his hair with a yumi on occasions  Denies fever, chills, pain, penile discahrge, dysuria, or hematuria  Review of Systems   Review of Systems   Constitutional: Negative for activity change, appetite change and chills  Respiratory: Negative for cough  Cardiovascular: Negative for chest pain  Gastrointestinal: Negative for abdominal pain and nausea  Genitourinary: Negative for frequency, genital sores, penile swelling and scrotal swelling  Skin: Positive for rash  Negative for color change  Neurological: Negative for headaches           Current Medications       Current Outpatient Medications:   •  busPIRone (BUSPAR) 5 mg tablet, , Disp: , Rfl:   •  clindamycin (CLEOCIN) 300 MG capsule, Take 1 capsule (300 mg total) by mouth 3 (three) times a day for 5 days, Disp: 15 capsule, Rfl: 0  •  naproxen (Naprosyn) 500 mg tablet, Take 1 tablet (500 mg total) by mouth 2 (two) times a day with meals, Disp: 30 tablet, Rfl: 0  •  simethicone (Gas-X Extra Strength) 125 MG CAPS, Take 1 capsule (125 mg total) by mouth every 6 (six) hours as needed for flatulence, Disp: 28 capsule, Rfl: 0  •  dicyclomine (BENTYL) 10 mg capsule, Take 1 capsule (10 mg total) by mouth 2 (two) times a day (Patient not taking: Reported on 12/19/2022), Disp: 10 capsule, Rfl: 0    Current Allergies     Allergies as of 12/20/2022 - Reviewed 12/20/2022   Allergen Reaction Noted   • Amoxicillin  04/30/2018   • Amoxicillin Hives 02/19/2020            The following portions of the patient's history were reviewed and updated as appropriate: allergies, current medications, past family history, past medical history, past social history, past surgical history and problem list      Past Medical History:   Diagnosis Date   • Psychiatric disorder     anxiety       History reviewed  No pertinent surgical history  History reviewed  No pertinent family history  Medications have been verified  Objective   Pulse 84   Temp (!) 97 °F (36 1 °C)   Resp 18   SpO2 98%        Physical Exam     Physical Exam  Vitals and nursing note reviewed  Constitutional:       General: He is not in acute distress  Appearance: Normal appearance  He is not ill-appearing  Eyes:      Extraocular Movements: Extraocular movements intact  Conjunctiva/sclera: Conjunctivae normal       Pupils: Pupils are equal, round, and reactive to light  Cardiovascular:      Rate and Rhythm: Normal rate and regular rhythm  Pulses: Normal pulses  Pulmonary:      Effort: Pulmonary effort is normal  No respiratory distress  Musculoskeletal:      Cervical back: Normal range of motion and neck supple  No tenderness  Lymphadenopathy:      Cervical: No cervical adenopathy  Skin:     Comments: Single erythematous lesion right side of inguinal region - 3 mm  Dry, clean, no discharged noted  Neurological:      Mental Status: He is alert

## 2022-12-29 NOTE — ED NOTES
40 yo [id number], transfer of care from [facility]  AMA, obesity, h/o ID GDM prior pregnancy, grand multip    Flu vax= declines, understands risks  12/27/22 Lev 2 US: nl but incomplete; recommend daily ASA & rpt US at 28w  Discussed initiating ASA as recommended, explained purpose to decrease risk of pre-eclampsia  Patient understands and rx has already been sent to her pharmacy  + quickening, denies LOF/VB or pain    S=D, normal FHTs, normal BP    RV 4w Xray at bedside     Elena Aragon, [facility]  01/01/20 1495

## 2023-01-04 ENCOUNTER — AMB VIDEO VISIT (OUTPATIENT)
Dept: OTHER | Facility: HOSPITAL | Age: 28
End: 2023-01-04

## 2023-01-04 DIAGNOSIS — K08.89 PAIN, DENTAL: Primary | ICD-10-CM

## 2023-01-04 DIAGNOSIS — S03.00XA DISLOCATION OF TEMPOROMANDIBULAR JOINT, INITIAL ENCOUNTER: ICD-10-CM

## 2023-01-04 NOTE — PROGRESS NOTES
Video Visit - Euna Angelucci 32 y o  male MRN: [id number]    REQUIRED DOCUMENTATION:         1  [facility] was [facility]  2  Provider located at [address] [place] [id number] [phone]  3  [facility] provider: Allyssa Darnell PA-C   4  Identify all parties in room with patient during [facility] visit:  No one else  5  After connecting through [employer], patient was identified by name and date of birth  Patient was then informed that this was a Telemedicine visit and that the exam was being conducted confidentially over secure lines  My office door was closed  No one else was in the room  Patient acknowledged consent and understanding of privacy and security of the Telemedicine visit  I informed the patient that I have reviewed their record in Epic and presented the opportunity for them to ask any questions regarding the visit today  The patient agreed to participate  HPI   Patient is having pain in his tooth  He has a cracked tooth and it is causing his TMJ to act up  He hasn't been able to eat and drink for 2 days  He had an apt with the dentist   He needs a referral to [facility] to be seen  He is having clicking in his jaw and ear pain  His dentist told him he didn't need antibiotics due to the nerve is exposed  He saw the dentist two days ago  He gave him a list of oral surgery  Physical Exam  Constitutional:       General: He is not in acute distress  Appearance: Normal appearance  He is not ill-appearing, toxic-appearing or diaphoretic  HENT:      Head: Normocephalic and atraumatic  Pulmonary:      Effort: Pulmonary effort is normal  No respiratory distress  Skin:     General: Skin is dry  Neurological:      General: No focal deficit present  Mental Status: He is alert and oriented to person, place, and time     Psychiatric:         Mood and Affect: Mood normal          Behavior: Behavior normal          Diagnoses and all orders for this visit:    Pain, dental  -     Ambulatory Referral to Oral Maxillofacial Surgery; Future    Dislocation of temporomandibular joint, initial encounter  -     Ambulatory Referral to Oral Maxillofacial Surgery; Future    pt states he just needed a referral to [facility] so he could get an apt today at 15  Referral placed  Patient Instructions     Dental Abscess   WHAT YOU NEED TO KNOW:   A dental abscess is a collection of pus in or around a tooth  A dental abscess is caused by bacteria  The bacteria can enter the tooth when the enamel (outer part of the tooth) is damaged by tooth decay  Bacteria can also enter the tooth through a chip in the tooth or a cut in the gum  Food particles that are stuck between the teeth for a long time may also lead to an abscess  DISCHARGE INSTRUCTIONS:   Return to the emergency department if:   · You have severe pain in your tooth or jaw  · You have trouble breathing because of pain or swelling  Call your doctor if:   · Your symptoms get worse, even after treatment  · Your mouth is bleeding  · You cannot eat or drink because of pain or swelling  · Your abscess returns  · You have an injury that causes a crack in your tooth  · You have questions or concerns about your condition or care  Medicines: You may  need any of the following:  · Antibiotics  help treat a bacterial infection  · NSAIDs , such as ibuprofen, help decrease swelling, pain, and fever  This medicine is available with or without a doctor's order  NSAIDs can cause stomach bleeding or kidney problems in certain people  If you take blood thinner medicine, always ask your healthcare provider if NSAIDs are safe for you  Always read the medicine label and follow directions  · Acetaminophen  decreases pain and fever  It is available without a doctor's order  Ask how much to take and how often to take it  Follow directions   Read the labels of all other medicines you are using to see if they also contain acetaminophen, or ask your doctor or pharmacist  Acetaminophen can cause liver damage if not taken correctly  Do not use more than 4 grams (4,000 milligrams) total of acetaminophen in one day  · Prescription pain medicine  may be given  Ask your healthcare provider how to take this medicine safely  Some prescription pain medicines contain acetaminophen  Do not take other medicines that contain acetaminophen without talking to your healthcare provider  Too much acetaminophen may cause liver damage  Prescription pain medicine may cause constipation  Ask your healthcare provider how to prevent or treat constipation  · Take your medicine as directed  Contact your healthcare provider if you think your medicine is not helping or if you have side effects  Tell him of her if you are allergic to any medicine  Keep a list of the medicines, vitamins, and herbs you take  Include the amounts, and when and why you take them  Bring the list or the pill bottles to follow-up visits  Carry your medicine list with you in case of an emergency  Self-care:   · Rinse your mouth every 2 hours with salt water  This will help keep the area clean  · Gently brush your teeth twice a day with a soft tooth brush  This will help keep the area clean  · Eat soft foods as directed  Soft foods may cause less pain  Examples include applesauce, yogurt, and cooked pasta  Ask your healthcare provider how long to follow this instruction  · Apply a warm compress to your tooth or gum  Use a cotton ball or gauze soaked in warm water  Remove the compress in 10 minutes or when it becomes cool  Repeat 3 times a day  Prevent another abscess:   · Brush your teeth at least 2 times a day  with fluoride toothpaste  · Use dental floss at least once a day  to clean between your teeth  · Rinse your mouth with water or mouthwash  after meals and snacks  Chew sugarless gum      · Avoid sugary and starchy food that can stick between your teeth  Limit drinks high in sugar, such as soda or fruit juice  · See your dentist every 6 months  for dental cleanings and oral exams  Follow up with your doctor or dentist in 24 hours, or as directed: Your healthcare provider will need to check your teeth and gums  Write down your questions so you remember to ask them during your visits  © Copyright [employer] 2022 Information is for End User's use only and may not be sold, redistributed or otherwise used for commercial purposes  All illustrations and images included in CareNotes® are the copyrighted property of [employer]  or [facility] Esteban Sow   The above information is an  only  It is not intended as medical advice for individual conditions or treatments  Talk to your doctor, nurse or pharmacist before following any medical regimen to see if it is safe and effective for you

## 2023-01-04 NOTE — CARE ANYWHERE EVISITS
Visit Summary for [facility]   Pauline - Gender: Male - Date of Birth: [id number]  Date: [id number] - Duration: 7 minutes  Patient: [facility]   Merlyn [place] Island[place]  Provider: Rachel Ramirez PA-C    Patient Contact Information  Address  [address] [place] [address]; [place] [address]  [phone]    Visit Topics  Severe toothache [Added By: Self - 2023-01-04]    Triage Questions   What is your current physical address in the event of a medical emergency? Answer []  Are you allergic to any medications? Answer []  Are you now or could you be pregnant? Answer []  Do you have any immune system compromise or chronic lung   disease? Answer []  Do you have any vulnerable family members in the home (infant, pregnant, cancer, elderly)? Answer []     Conversation Transcripts  [0A][0A] [Notification] You are connected with Rachel Ramirez PA-C, Urgent Care Specialist [0A][Notification] Louie Mackey is located in [place]  [0A][Notification] Louie Pritchett has shared health history  Dara Soulier  [0A]    Diagnosis  Other specified disorders of teeth and supporting structures  Dislocation of jaw, unspecified side, initial encounter    Procedures  Value: [id number] Code: CPT-4 UNLISTED E&M SERVICE    Medications Prescribed    No prescriptions ordered    Electronically signed by: Stephanie Peralta(NPI [id number])

## 2023-01-04 NOTE — PATIENT INSTRUCTIONS
Dental Abscess   WHAT YOU NEED TO KNOW:   A dental abscess is a collection of pus in or around a tooth  A dental abscess is caused by bacteria  The bacteria can enter the tooth when the enamel (outer part of the tooth) is damaged by tooth decay  Bacteria can also enter the tooth through a chip in the tooth or a cut in the gum  Food particles that are stuck between the teeth for a long time may also lead to an abscess  DISCHARGE INSTRUCTIONS:   Return to the emergency department if:   You have severe pain in your tooth or jaw  You have trouble breathing because of pain or swelling  Call your doctor if:   Your symptoms get worse, even after treatment  Your mouth is bleeding  You cannot eat or drink because of pain or swelling  Your abscess returns  You have an injury that causes a crack in your tooth  You have questions or concerns about your condition or care  Medicines: You may  need any of the following:  Antibiotics  help treat a bacterial infection  NSAIDs , such as ibuprofen, help decrease swelling, pain, and fever  This medicine is available with or without a doctor's order  NSAIDs can cause stomach bleeding or kidney problems in certain people  If you take blood thinner medicine, always ask your healthcare provider if NSAIDs are safe for you  Always read the medicine label and follow directions  Acetaminophen  decreases pain and fever  It is available without a doctor's order  Ask how much to take and how often to take it  Follow directions  Read the labels of all other medicines you are using to see if they also contain acetaminophen, or ask your doctor or pharmacist  Acetaminophen can cause liver damage if not taken correctly  Do not use more than 4 grams (4,000 milligrams) total of acetaminophen in one day  Prescription pain medicine  may be given  Ask your healthcare provider how to take this medicine safely  Some prescription pain medicines contain acetaminophen  Do not take other medicines that contain acetaminophen without talking to your healthcare provider  Too much acetaminophen may cause liver damage  Prescription pain medicine may cause constipation  Ask your healthcare provider how to prevent or treat constipation  Take your medicine as directed  Contact your healthcare provider if you think your medicine is not helping or if you have side effects  Tell him of her if you are allergic to any medicine  Keep a list of the medicines, vitamins, and herbs you take  Include the amounts, and when and why you take them  Bring the list or the pill bottles to follow-up visits  Carry your medicine list with you in case of an emergency  Self-care:   Rinse your mouth every 2 hours with salt water  This will help keep the area clean  Gently brush your teeth twice a day with a soft tooth brush  This will help keep the area clean  Eat soft foods as directed  Soft foods may cause less pain  Examples include applesauce, yogurt, and cooked pasta  Ask your healthcare provider how long to follow this instruction  Apply a warm compress to your tooth or gum  Use a cotton ball or gauze soaked in warm water  Remove the compress in 10 minutes or when it becomes cool  Repeat 3 times a day  Prevent another abscess:   Brush your teeth at least 2 times a day  with fluoride toothpaste  Use dental floss at least once a day  to clean between your teeth  Rinse your mouth with water or mouthwash  after meals and snacks  Chew sugarless gum  Avoid sugary and starchy food that can stick between your teeth  Limit drinks high in sugar, such as soda or fruit juice  See your dentist every 6 months  for dental cleanings and oral exams  Follow up with your doctor or dentist in 24 hours, or as directed: Your healthcare provider will need to check your teeth and gums  Write down your questions so you remember to ask them during your visits     © Copyright [employer] 2022 Information is for End User's use only and may not be sold, redistributed or otherwise used for commercial purposes  All illustrations and images included in CareNotes® are the copyrighted property of [employer] , Inc  or Brandon Bourne  The above information is an  only  It is not intended as medical advice for individual conditions or treatments  Talk to your doctor, nurse or pharmacist before following any medical regimen to see if it is safe and effective for you

## 2023-04-21 ENCOUNTER — APPOINTMENT (OUTPATIENT)
Dept: RADIOLOGY | Facility: CLINIC | Age: 28
End: 2023-04-21

## 2023-04-21 DIAGNOSIS — M54.6 ACUTE MIDLINE THORACIC BACK PAIN: ICD-10-CM

## 2023-04-25 ENCOUNTER — OFFICE VISIT (OUTPATIENT)
Dept: URGENT CARE | Facility: CLINIC | Age: 28
End: 2023-04-25

## 2023-04-25 VITALS
RESPIRATION RATE: 18 BRPM | HEART RATE: 56 BPM | SYSTOLIC BLOOD PRESSURE: 121 MMHG | DIASTOLIC BLOOD PRESSURE: 72 MMHG | OXYGEN SATURATION: 99 % | TEMPERATURE: 97.6 F

## 2023-04-25 DIAGNOSIS — H65.91 MIDDLE EAR EFFUSION, RIGHT: Primary | ICD-10-CM

## 2023-04-25 DIAGNOSIS — M26.609 TEMPOROMANDIBULAR JOINT DISORDER: ICD-10-CM

## 2023-04-25 PROBLEM — F43.10 PTSD (POST-TRAUMATIC STRESS DISORDER): Status: ACTIVE | Noted: 2023-01-23

## 2023-04-25 PROBLEM — B00.9 HERPES SIMPLEX TYPE 2 INFECTION: Status: ACTIVE | Noted: 2021-05-05

## 2023-04-25 PROBLEM — F90.9 ATTENTION DEFICIT HYPERACTIVITY DISORDER: Status: ACTIVE | Noted: 2017-09-15

## 2023-04-25 PROBLEM — IMO0001 SEVERE ACUTE RESPIRATORY SYNDROME CORONAVIRUS 2 (SARS-COV-2) VACCINATION NOT INDICATED: Status: ACTIVE | Noted: 2021-05-27

## 2023-04-25 RX ORDER — IMIQUIMOD 12.5 MG/.25G
CREAM TOPICAL
COMMUNITY
Start: 2023-04-25 | End: 2023-05-07 | Stop reason: ALTCHOICE

## 2023-04-25 RX ORDER — FLUTICASONE PROPIONATE 50 MCG
1 SPRAY, SUSPENSION (ML) NASAL DAILY
Qty: 9.9 ML | Refills: 0 | Status: SHIPPED | OUTPATIENT
Start: 2023-04-25

## 2023-04-25 RX ORDER — AZITHROMYCIN 250 MG/1
TABLET, FILM COATED ORAL
Qty: 6 TABLET | Refills: 0 | Status: SHIPPED | OUTPATIENT
Start: 2023-04-25 | End: 2023-04-29

## 2023-04-25 RX ORDER — PREDNISONE 20 MG/1
40 TABLET ORAL DAILY
Qty: 10 TABLET | Refills: 0 | Status: SHIPPED | OUTPATIENT
Start: 2023-04-25 | End: 2023-04-30

## 2023-04-25 RX ORDER — FLUOCINONIDE 0.5 MG/G
OINTMENT TOPICAL
COMMUNITY
Start: 2023-04-25 | End: 2023-05-07 | Stop reason: ALTCHOICE

## 2023-04-25 RX ORDER — FAMOTIDINE 20 MG/1
20 TABLET, FILM COATED ORAL 2 TIMES DAILY
COMMUNITY
Start: 2023-04-12 | End: 2023-05-07 | Stop reason: ALTCHOICE

## 2023-04-25 NOTE — PROGRESS NOTES
[facility]        NAME: Cami Whitehead is a 32 y o  male  : 1995    MRN: [id number]  DATE: 2023  TIME: 1:54 PM    Assessment and Plan   Middle ear effusion, right [H65 91]  1  Middle ear effusion, right  fluticasone (FLONASE) 50 mcg/act nasal spray    azithromycin (ZITHROMAX) 250 mg tablet    predniSONE 20 mg tablet            Patient Instructions     Use flonase with nasal saline twice daily for next 2-3 days and take prednisone as directed  If pain persists, may start antibiotics  May take tylenol/ibuprofen every 4-6 hours as needed for pain  Follow-up with PCP in 3-5 days if no improvement of symptoms  Report to ER if symptoms worsen  Chief Complaint     Chief Complaint   Patient presents with   • Earache     Pt states ear pain on right side started 3 days ago  Pt states it feels swollen to the touch  History of Present Illness       32year old male presents for evaluation of right ear pain for the past 3 days  Denies any known triggers or sick contacts, but does have a prior history of TMJ  He has not yet tried anything for symptoms  Earache   There is pain in the right ear  This is a new problem  The current episode started in the past 7 days  The problem occurs constantly  The problem has been unchanged  There has been no fever  The fever has been present for 3 to 4 days  The pain is at a severity of 8/10  The pain is moderate  Associated symptoms include headaches  Pertinent negatives include no abdominal pain, coughing, diarrhea, ear discharge, hearing loss, neck pain, rash, rhinorrhea, sore throat or vomiting  He has tried nothing for the symptoms  The treatment provided no relief  There is no history of a chronic ear infection, hearing loss or a tympanostomy tube  Review of Systems   Review of Systems   Constitutional: Negative for activity change, appetite change, chills, fatigue and fever  HENT: Positive for ear pain, sinus pressure and sinus pain  Negative for congestion, ear discharge, hearing loss, postnasal drip, rhinorrhea, sore throat and trouble swallowing  Respiratory: Negative for cough, chest tightness and shortness of breath  Cardiovascular: Negative for chest pain and palpitations  Gastrointestinal: Negative for abdominal pain, constipation, diarrhea, nausea and vomiting  Musculoskeletal: Negative for arthralgias, myalgias and neck pain  Skin: Negative for color change and rash  Neurological: Positive for headaches  Negative for dizziness and light-headedness  Current Medications       Current Outpatient Medications:   •  azithromycin (ZITHROMAX) 250 mg tablet, Take 2 tablets today then 1 tablet daily x 4 days, Disp: 6 tablet, Rfl: 0  •  busPIRone (BUSPAR) 5 mg tablet, , Disp: , Rfl:   •  fluticasone (FLONASE) 50 mcg/act nasal spray, 1 spray into each nostril daily, Disp: 9 9 mL, Rfl: 0  •  predniSONE 20 mg tablet, Take 2 tablets (40 mg total) by mouth daily for 5 days, Disp: 10 tablet, Rfl: 0  •  famotidine (PEPCID) 20 mg tablet, Take 20 mg by mouth 2 (two) times a day (Patient not taking: Reported on 4/25/2023), Disp: , Rfl:   •  fluocinonide (LIDEX) 0 05 % ointment, , Disp: , Rfl:   •  ibuprofen (MOTRIN) 600 mg tablet, Take 1 tablet (600 mg total) by mouth every 6 (six) hours as needed for mild pain (Patient not taking: Reported on 4/25/2023), Disp: 30 tablet, Rfl: 0  •  imiquimod (ALDARA) 5 % cream, , Disp: , Rfl:     Current Allergies     Allergies as of 04/25/2023 - Reviewed 04/25/2023   Allergen Reaction Noted   • Amoxicillin  04/30/2018   • Amoxicillin Hives 02/19/2020            The following portions of the patient's history were reviewed and updated as appropriate: allergies, current medications, past family history, past medical history, past social history, past surgical history and problem list      Past Medical History:   Diagnosis Date   • Psychiatric disorder     anxiety       History reviewed   No pertinent surgical history  History reviewed  No pertinent family history  Medications have been verified  Objective   /72   Pulse 56   Temp 97 6 °F (36 4 °C)   Resp 18   SpO2 99%        Physical Exam     Physical Exam  Vitals and nursing note reviewed  Constitutional:       General: He is awake  Appearance: Normal appearance  He is well-developed and normal weight  HENT:      Head: Normocephalic and atraumatic  Right Ear: Hearing, ear canal and external ear normal  A middle ear effusion is present  Tympanic membrane is not perforated, erythematous or retracted  Left Ear: Hearing, tympanic membrane, ear canal and external ear normal   No middle ear effusion  Tympanic membrane is not perforated, erythematous or retracted  Nose: No congestion or rhinorrhea  Right Turbinates: Not enlarged, swollen or pale  Left Turbinates: Not enlarged, swollen or pale  Mouth/Throat:      Lips: Pink  Mouth: Mucous membranes are moist       Pharynx: Oropharynx is clear  Uvula midline  No pharyngeal swelling, oropharyngeal exudate or posterior oropharyngeal erythema  Tonsils: 2+ on the right  2+ on the left  Eyes:      Extraocular Movements: Extraocular movements intact  Conjunctiva/sclera: Conjunctivae normal       Pupils: Pupils are equal, round, and reactive to light  Cardiovascular:      Rate and Rhythm: Normal rate and regular rhythm  Pulses: Normal pulses  Heart sounds: Normal heart sounds  Pulmonary:      Effort: Pulmonary effort is normal       Breath sounds: Normal breath sounds  Musculoskeletal:      Cervical back: Full passive range of motion without pain, normal range of motion and neck supple  Lymphadenopathy:      Cervical: No cervical adenopathy  Skin:     General: Skin is warm and dry  Neurological:      Mental Status: He is alert and oriented to person, place, and time     Psychiatric:         Mood and Affect: Mood normal  Behavior: Behavior normal  Behavior is cooperative  Thought Content:  Thought content normal          Judgment: Judgment normal

## 2023-04-25 NOTE — PATIENT INSTRUCTIONS
Use flonase with nasal saline twice daily for next 2-3 days and take prednisone as directed  If pain persists, may start antibiotics  May take tylenol/ibuprofen every 4-6 hours as needed for pain  Follow-up with PCP in 3-5 days if no improvement of symptoms  Report to ER if symptoms worsen

## 2023-04-25 NOTE — LETTER
April 25, 2023     Patient: Agata Costello   YOB: 1995   Date of Visit: 4/25/2023       To Whom it May Concern:    Agata Costello was seen in my clinic on 4/25/2023  He may return to work on 04/26/2023  If you have any questions or concerns, please don't hesitate to call           Sincerely,          SOWMYA Medrano        CC: No Recipients

## 2023-05-05 ENCOUNTER — AMB VIDEO VISIT (OUTPATIENT)
Dept: OTHER | Facility: HOSPITAL | Age: 28
End: 2023-05-05

## 2023-05-05 DIAGNOSIS — K59.00 CONSTIPATION, UNSPECIFIED CONSTIPATION TYPE: Primary | ICD-10-CM

## 2023-05-05 NOTE — PATIENT INSTRUCTIONS
Constipation   WHAT YOU NEED TO KNOW:   Constipation means you have hard, dry bowel movements, or you go longer than usual between bowel movements  DISCHARGE INSTRUCTIONS:   Call your doctor if:   You have blood in your bowel movements  You have a fever and abdominal pain with the constipation  Your constipation gets worse  You start to vomit  You have questions or concerns about your condition or care  Medicines:   Medicine  such as a laxative may help relax and loosen your intestines to help you have a bowel movement  Your provider may recommend you only use laxatives for a short time  Long-term use may make your bowels dependent on the medicine  Take your medicine as directed  Contact your healthcare provider if you think your medicine is not helping or if you have side effects  Tell your provider if you are allergic to any medicine  Keep a list of the medicines, vitamins, and herbs you take  Include the amounts, and when and why you take them  Bring the list or the pill bottles to follow-up visits  Carry your medicine list with you in case of an emergency  Relieve constipation:   A suppository  may be used to help soften your bowel movements  This may make them easier to pass  A suppository is guided into your rectum through your anus  An enema  is liquid medicine used to clear bowel movement from your rectum  The medicine is put into your rectum through your anus  Prevent constipation:   Drink liquids as directed  You may need to drink extra liquids to help soften and move your bowels  Ask how much liquid to drink each day and which liquids are best for you  Eat high-fiber foods  This may help decrease constipation by adding bulk to your bowel movements  High-fiber foods include fruit, vegetables, whole-grain breads and cereals, and beans  Your healthcare provider or dietitian can help you create a high-fiber meal plan   Your provider may also recommend a fiber supplement if you cannot get enough fiber from food  Exercise regularly  Regular physical activity can help stimulate your intestines  Walking is a good exercise to prevent or relieve constipation  Ask which exercises are best for you  Schedule a time each day to have a bowel movement  This may help train your body to have regular bowel movements  Bend forward while you are on the toilet to help move the bowel movement out  Sit on the toilet for at least 10 minutes, even if you do not have a bowel movement  Talk to your healthcare provider about your medicines  Certain medicines, such as opioids, can cause constipation  Your provider may be able to make medicine changes  For example, he or she may change the kind of medicine, or change when you take it  Follow up with your doctor as directed:  Write down your questions so you remember to ask them during your visits  © Copyright [employer] 2022 Information is for End User's use only and may not be sold, redistributed or otherwise used for commercial purposes  The above information is an  only  It is not intended as medical advice for individual conditions or treatments  Talk to your doctor, nurse or pharmacist before following any medical regimen to see if it is safe and effective for you

## 2023-05-05 NOTE — PROGRESS NOTES
Video Visit - Concepcion Carballo 32 y o  male MRN: [id number]    REQUIRED DOCUMENTATION:         1  This service was provided via [employer]  2  Provider located at [address] [place] [id number] [phone]  3  [facility] provider: Damian Nino PA-C   4  Identify all parties in room with patient during [facility] visit:  No one else  5  After connecting through [email or web address], patient was identified by name and date of birth  Patient was then informed that this was a Telemedicine visit and that the exam was being conducted confidentially over secure lines  My office door was closed  No one else was in the room  Patient acknowledged consent and understanding of privacy and security of the Telemedicine visit  I informed the patient that I have reviewed their record in Epic and presented the opportunity for them to ask any questions regarding the visit today  The patient agreed to participate  HPI  Patient states that he is having stomach cramps for the past two days  He had diarrhea yesterday  He states his stools comes out and then doesn't come out and comes out loose  He had constipation in the past   He states that he saw a GI doctor in the past   He had some acid reflux a day before  He is having a lot of gas  He last solid bowel movement was yesterday  Review of Systems   Constitutional: Negative  HENT: Negative  Respiratory: Negative  Cardiovascular: Negative  Gastrointestinal: Positive for abdominal pain, constipation and nausea  Negative for diarrhea and vomiting  Musculoskeletal: Negative  Skin: Negative  Neurological: Negative  Psychiatric/Behavioral: Negative  Physical Exam  Constitutional:       General: He is not in acute distress  Appearance: Normal appearance  He is not ill-appearing, toxic-appearing or diaphoretic  HENT:      Head: Normocephalic and atraumatic  Abdominal:      General: Abdomen is flat        Tenderness: There is abdominal tenderness  There is no guarding or rebound  Skin:     General: Skin is dry  Neurological:      General: No focal deficit present  Mental Status: He is alert and oriented to person, place, and time  Psychiatric:         Mood and Affect: Mood normal          Behavior: Behavior normal          Diagnoses and all orders for this visit:    Constipation, unspecified constipation type  -     Ambulatory Referral to Gastroenterology; Future    drink plenty of fluids  otc stool softener as directed  If no improvement of pain worsens go to ER   Referral placed to GI  Patient Instructions     Constipation   WHAT YOU NEED TO KNOW:   Constipation means you have hard, dry bowel movements, or you go longer than usual between bowel movements  DISCHARGE INSTRUCTIONS:   Call your doctor if:    You have blood in your bowel movements   You have a fever and abdominal pain with the constipation   Your constipation gets worse   You start to vomit   You have questions or concerns about your condition or care  Medicines:    Medicine  such as a laxative may help relax and loosen your intestines to help you have a bowel movement  Your provider may recommend you only use laxatives for a short time  Long-term use may make your bowels dependent on the medicine   Take your medicine as directed  Contact your healthcare provider if you think your medicine is not helping or if you have side effects  Tell your provider if you are allergic to any medicine  Keep a list of the medicines, vitamins, and herbs you take  Include the amounts, and when and why you take them  Bring the list or the pill bottles to follow-up visits  Carry your medicine list with you in case of an emergency  Relieve constipation:    A suppository  may be used to help soften your bowel movements  This may make them easier to pass  A suppository is guided into your rectum through your anus            An enema  is liquid medicine used to clear bowel movement from your rectum  The medicine is put into your rectum through your anus  Prevent constipation:    Drink liquids as directed  You may need to drink extra liquids to help soften and move your bowels  Ask how much liquid to drink each day and which liquids are best for you   Eat high-fiber foods  This may help decrease constipation by adding bulk to your bowel movements  High-fiber foods include fruit, vegetables, whole-grain breads and cereals, and beans  Your healthcare provider or dietitian can help you create a high-fiber meal plan  Your provider may also recommend a fiber supplement if you cannot get enough fiber from food   Exercise regularly  Regular physical activity can help stimulate your intestines  Walking is a good exercise to prevent or relieve constipation  Ask which exercises are best for you   Schedule a time each day to have a bowel movement  This may help train your body to have regular bowel movements  Bend forward while you are on the toilet to help move the bowel movement out  Sit on the toilet for at least 10 minutes, even if you do not have a bowel movement   Talk to your healthcare provider about your medicines  Certain medicines, such as opioids, can cause constipation  Your provider may be able to make medicine changes  For example, he or she may change the kind of medicine, or change when you take it  Follow up with your doctor as directed:  Write down your questions so you remember to ask them during your visits  © Copyright Adis Mercado 2022 Information is for End User's use only and may not be sold, redistributed or otherwise used for commercial purposes  The above information is an  only  It is not intended as medical advice for individual conditions or treatments  Talk to your doctor, nurse or pharmacist before following any medical regimen to see if it is safe and effective for you

## 2023-05-05 NOTE — CARE ANYWHERE EVISITS
Visit Summary for [facility]   Pauline - Gender: Male - Date of Birth: [id number]  Date: [id number] - Duration: 8 minutes  Patient: [facility]   Tram Khanna  Provider: Donovan Chi PA-C    Patient Contact Information  Address  [address] [place] [address]; [place] [address]  [phone]    Visit Topics  Stomachache [Added By: Self - 2023-05-05]    Triage Questions   What is your current physical address in the event of a medical emergency? Answer []  Are you allergic to any medications? Answer []  Are you now or could you be pregnant? Answer []  Do you have any immune system compromise or chronic lung   disease? Answer []  Do you have any vulnerable family members in the home (infant, pregnant, cancer, elderly)? Answer []     Conversation Transcripts  [0A][0A] [Notification] You are connected with Donovan Chi PA-C, Urgent Care Specialist [0A][Notification] [facility] is located in [address] [place] [address]  [0A][Notification] [facility] has shared health history  Devi Hung  [0A]    Diagnosis  Constipation, unspecified    Procedures  Value: 22807 Code: CPT-4 UNLISTED E&M SERVICE    Medications Prescribed    No prescriptions ordered    Electronically signed by: Stephanie Avalos(NPI [id number])

## 2023-05-07 ENCOUNTER — AMB VIDEO VISIT (OUTPATIENT)
Dept: OTHER | Facility: HOSPITAL | Age: 28
End: 2023-05-07

## 2023-05-07 DIAGNOSIS — S00.83XA HEMATOMA OF OCCIPITAL SURFACE OF HEAD, INITIAL ENCOUNTER: Primary | ICD-10-CM

## 2023-05-07 NOTE — CARE ANYWHERE EVISITS
Visit Summary for Ashleycristhian Carpenter - Gender: Male - Date of Birth: [id number]  Date: [id number] - Duration: 11 minutes  Patient: Eliseo Carpenter  Provider: Bam Fleming PA-C    Patient Contact Information  Address  [address] [place] [address]; [place] [address]  [phone]    Visit Topics  Head injury  [Added By: Self - 2023-05-07]    Triage Questions   What is your current physical address in the event of a medical emergency? Answer []  Are you allergic to any medications? Answer []  Are you now or could you be pregnant? Answer []  Do you have any immune system compromise or chronic lung   disease? Answer []  Do you have any vulnerable family members in the home (infant, pregnant, cancer, elderly)? Answer []     Conversation Transcripts  [0A][0A] [Notification] You are connected with Bam Fleming PA-C, Urgent Care Specialist [0A][Notification] [facility] located in [place]  [0A][Notification] [facility] has shared health history  Dary Recinos  [0A]    Diagnosis  Contusion of other part of head, initial encounter    Procedures  Value: 77987 Code: CPT-4 UNLISTED E&M SERVICE    Medications Prescribed    No prescriptions ordered    Electronically signed by: Nevaeh Oliver(NPI [id number])

## 2023-05-07 NOTE — PATIENT INSTRUCTIONS
"Schedule a follow-up appointment with your primary care physician for recheck in 2-3 days  If you cannot see your PCP, you can schedule a follow up appointment at a [address] Now  Go to the emergency department if you develop any new or worsening symptoms including inability to wake every 2 hours, sudden loss of consciousness, vomiting 3x or more, or anything else that is concerning  Excuses can be found in \"Letters\" section of [employer] [facility]  Can print if opened from a [address],[facility] [place] phone number is [phone] if you need assistance or have further questions    1 21 ) SALLY ([phone])  Schedule or Reschedule Outpatient Testing - Option 2  Billing - Option 3  General Info - Option 4  [facility] Help - Option 5  Comprehensive Spine Program - Option 6   COVID - Option 7    Head Injury   WHAT YOU NEED TO KNOW:   A head injury can include your scalp, face, skull, or brain and range from mild to severe  Effects can appear immediately after the injury or develop later  The effects may last a short time or be permanent  Healthcare providers may want to check your recovery over time  Treatment may change as you recover or develop new health problems from the head injury  DISCHARGE INSTRUCTIONS:   Call your local emergency number (911 in the [place]), or have someone else call if:   You cannot be woken  You have a seizure  You stop responding to others or you faint  You have blurry or double vision  Your speech becomes slurred or confused  You have arm or leg weakness, loss of feeling, or new problems with coordination  Your pupils are larger than usual, or one pupil is a different size than the other  You have blood or clear fluid coming out of your ears or nose  Return to the emergency department if:   You have repeated or forceful vomiting  You feel confused  Your headache gets worse or becomes severe      You or someone caring for you notices that you are harder to wake than " usual     Call your doctor if:   Your symptoms last longer than 6 weeks after the injury  You have questions or concerns about your condition or care  Medicines:   Acetaminophen  decreases pain and fever  It is available without a doctor's order  Ask how much to take and how often to take it  Follow directions  Read the labels of all other medicines you are using to see if they also contain acetaminophen, or ask your doctor or pharmacist  Acetaminophen can cause liver damage if not taken correctly  Take your medicine as directed  Contact your healthcare provider if you think your medicine is not helping or if you have side effects  Tell your provider if you are allergic to any medicine  Keep a list of the medicines, vitamins, and herbs you take  Include the amounts, and when and why you take them  Bring the list or the pill bottles to follow-up visits  Carry your medicine list with you in case of an emergency  Self-care:   Rest  or do quiet activities  Limit your time watching TV, using the computer, or doing tasks that require a lot of thinking  Slowly return to your normal activities as directed  Do not play sports or do activities that may cause you to get hit in the head  Ask your healthcare provider when you can return to sports  Apply ice  on your head for 15 to 20 minutes every hour or as directed  Use an ice pack, or put crushed ice in a plastic bag  Cover it with a towel before you apply it to your skin  Ice helps prevent tissue damage and decreases swelling and pain  Have someone stay with you for 24 hours  , or as directed  This person can monitor you for problems and call for help if needed  When you are awake, the person should ask you a few questions every few hours to see if you are thinking clearly  An example is to ask your name or address  Prevent another head injury:   Wear a helmet that fits properly  Do this when you play sports, or ride a bike, scooter, or skateboard  Helmets help decrease your risk for a serious head injury  Talk to your healthcare provider about other ways you can protect yourself if you play sports  Wear your seatbelt every time you are in a car  This helps lower your risk for a head injury if you are in a car accident  Follow up with your doctor as directed:  Write down your questions so you remember to ask them during your visits  © Copyright Catrina العلي 2022 Information is for End User's use only and may not be sold, redistributed or otherwise used for commercial purposes  The above information is an  only  It is not intended as medical advice for individual conditions or treatments  Talk to your doctor, nurse or pharmacist before following any medical regimen to see if it is safe and effective for you

## 2023-05-07 NOTE — PROGRESS NOTES
Video Visit - Shantelle Lara 32 y o  male MRN: [id number]    REQUIRED DOCUMENTATION:         1  [facility]  2  Provider located at [address] [place] [id number] [phone]  3  [employer] provider: Aries Murry PA-C   4  Identify all parties in room with patient during [facility] visit:  No one else  5  After connecting through [employer], patient was identified by name and date of birth  Patient was then informed that this was a Telemedicine visit and that the exam was being conducted confidentially over secure lines  My office door was closed  No one else was in the room  Patient acknowledged consent and understanding of privacy and security of the Telemedicine visit  I informed the patient that I have reviewed their record in Epic and presented the opportunity for them to ask any questions regarding the visit today  The patient agreed to participate  VITALS: Heart Rate: 102 BPM, Respiratory Rate: 12 RPM, Temperature Unavailable° F, Blood Pressure 120/87 mmHg, Pulse Ox 97 % on RA    HPI  Pt reports he was trying to move a shelf and shelf hit the back of his head at 1100  Reports he became dizzy like the room is spinning for about 5-10 seconds and resolved spontaneously  No LOC, no NV, no vision changes, no change in balance or slurred speech  No prior head injuries or concussions  Advil with relief of pain  Physical Exam  Constitutional:       General: He is not in acute distress  Appearance: Normal appearance  He is not toxic-appearing  HENT:      Head: Normocephalic and atraumatic  Nose: No rhinorrhea  Mouth/Throat:      Mouth: Mucous membranes are moist    Eyes:      Extraocular Movements: Extraocular movements intact  Conjunctiva/sclera: Conjunctivae normal       Pupils: Pupils are equal, round, and reactive to light  Comments: glasses   Cardiovascular:      Rate and Rhythm: Tachycardia present  "Comments: Mild, but patient is walking around  Pulmonary:      Effort: Pulmonary effort is normal  No respiratory distress  Breath sounds: No wheezing (no gross audible wheeze through computer)  Musculoskeletal:      Cervical back: Normal range of motion  Comments: amb about home without difficulty  Able to walk a straight line   Skin:     Findings: No rash (on face or neck)  Neurological:      Mental Status: He is alert  Cranial Nerves: No dysarthria or facial asymmetry  Coordination: Finger-Nose-Finger Test normal       Gait: Gait and tandem walk normal    Psychiatric:         Mood and Affect: Mood normal          Behavior: Behavior normal          Diagnoses and all orders for this visit:    Hematoma of occipital surface of head, initial encounter      Patient Instructions   Schedule a follow-up appointment with your primary care physician for recheck in 2-3 days  If you cannot see your PCP, you can schedule a follow up appointment at a [address] Now  Go to the emergency department if you develop any new or worsening symptoms including inability to wake every 2 hours, sudden loss of consciousness, vomiting 3x or more, or anything else that is concerning  Excuses can be found in \"Letters\" section of [employer] david  Can print if opened from a [facility] [address] phone number is [phone] if you need assistance or have further questions    1 21 [phone]) [employer] ([phone])  Schedule or Reschedule Outpatient Testing - Option 2  Billing - Option 3  General Info - Option 4  [facility] Help - Option 5  Comprehensive Spine Program - Option 6   COVID - Option 7    Head Injury   WHAT YOU NEED TO KNOW:   A head injury can include your scalp, face, skull, or brain and range from mild to severe  Effects can appear immediately after the injury or develop later  The effects may last a short time or be permanent  Healthcare providers may want to check your recovery over time   Treatment may change as " you recover or develop new health problems from the head injury  DISCHARGE INSTRUCTIONS:   Call your local emergency number (911 in the [place]), or have someone else call if:   • You cannot be woken  • You have a seizure  • You stop responding to others or you faint  • You have blurry or double vision  • Your speech becomes slurred or confused  • You have arm or leg weakness, loss of feeling, or new problems with coordination  • Your pupils are larger than usual, or one pupil is a different size than the other  • You have blood or clear fluid coming out of your ears or nose  Return to the emergency department if:   • You have repeated or forceful vomiting  • You feel confused  • Your headache gets worse or becomes severe  • You or someone caring for you notices that you are harder to wake than usual     Call your doctor if:   • Your symptoms last longer than 6 weeks after the injury  • You have questions or concerns about your condition or care  Medicines:   • Acetaminophen  decreases pain and fever  It is available without a doctor's order  Ask how much to take and how often to take it  Follow directions  Read the labels of all other medicines you are using to see if they also contain acetaminophen, or ask your doctor or pharmacist  Acetaminophen can cause liver damage if not taken correctly  • Take your medicine as directed  Contact your healthcare provider if you think your medicine is not helping or if you have side effects  Tell your provider if you are allergic to any medicine  Keep a list of the medicines, vitamins, and herbs you take  Include the amounts, and when and why you take them  Bring the list or the pill bottles to follow-up visits  Carry your medicine list with you in case of an emergency  Self-care:   • Rest  or do quiet activities  Limit your time watching TV, using the computer, or doing tasks that require a lot of thinking   Slowly return to your normal activities as directed  Do not play sports or do activities that may cause you to get hit in the head  Ask your healthcare provider when you can return to sports  • Apply ice  on your head for 15 to 20 minutes every hour or as directed  Use an ice pack, or put crushed ice in a plastic bag  Cover it with a towel before you apply it to your skin  Ice helps prevent tissue damage and decreases swelling and pain  • Have someone stay with you for 24 hours  , or as directed  This person can monitor you for problems and call for help if needed  When you are awake, the person should ask you a few questions every few hours to see if you are thinking clearly  An example is to ask your name or address  Prevent another head injury:   • Wear a helmet that fits properly  Do this when you play sports, or ride a bike, scooter, or skateboard  Helmets help decrease your risk for a serious head injury  Talk to your healthcare provider about other ways you can protect yourself if you play sports  • Wear your seatbelt every time you are in a car  This helps lower your risk for a head injury if you are in a car accident  Follow up with your doctor as directed:  Write down your questions so you remember to ask them during your visits  © Copyright Frances Shana 2022 Information is for End User's use only and may not be sold, redistributed or otherwise used for commercial purposes  The above information is an  only  It is not intended as medical advice for individual conditions or treatments  Talk to your doctor, nurse or pharmacist before following any medical regimen to see if it is safe and effective for you

## 2023-06-20 ENCOUNTER — APPOINTMENT (OUTPATIENT)
Dept: RADIOLOGY | Facility: CLINIC | Age: 28
End: 2023-06-20
Payer: MEDICARE

## 2023-06-20 ENCOUNTER — OFFICE VISIT (OUTPATIENT)
Dept: URGENT CARE | Facility: CLINIC | Age: 28
End: 2023-06-20
Payer: MEDICARE

## 2023-06-20 VITALS
OXYGEN SATURATION: 100 % | HEART RATE: 60 BPM | DIASTOLIC BLOOD PRESSURE: 78 MMHG | RESPIRATION RATE: 16 BRPM | SYSTOLIC BLOOD PRESSURE: 111 MMHG | TEMPERATURE: 96.4 F

## 2023-06-20 DIAGNOSIS — M54.6 ACUTE MIDLINE THORACIC BACK PAIN: ICD-10-CM

## 2023-06-20 DIAGNOSIS — M54.6 ACUTE MIDLINE THORACIC BACK PAIN: Primary | ICD-10-CM

## 2023-06-20 PROCEDURE — 72072 X-RAY EXAM THORAC SPINE 3VWS: CPT

## 2023-06-20 PROCEDURE — 99213 OFFICE O/P EST LOW 20 MIN: CPT

## 2023-06-20 PROCEDURE — G0463 HOSPITAL OUTPT CLINIC VISIT: HCPCS

## 2023-06-20 NOTE — PATIENT INSTRUCTIONS
No acute changes seen on x-ray as compared to prior on 04/21/23, will follow-up with final read by radiology if findings are significant  Apply heat every 3-4 hours for 20 minutes at a time and lidocaine patches (12 hours on, 12 hours off) as needed  May take tylenol/ibuprofen every 4-6 hours for additional relief  Follow-up with PCP in 3-5 days if no improvement of symptoms  Report to the ER if symptoms worsen

## 2023-06-20 NOTE — PROGRESS NOTES
"  [facility]        NAME: Andrew Ruiz is a 32 y o  male  : 1995    MRN: [id number]  DATE: 2023  TIME: 3:17 PM    Assessment and Plan   Acute midline thoracic back pain [M54 6]  1  Acute midline thoracic back pain  XR spine thoracic 3 vw         No acute fracture or dislocation seen on x-ray per provider read, will follow-up with final read by radiology if findings are significan    Patient Instructions     No acute changes seen on x-ray as compared to prior on 23, will follow-up with final read by radiology if findings are significant  May apply topical lidocaine patches and take tylenol/ibuprofen every 4-6 hours as needed for pain  Follow-up with PCP in 3-5 days if no improvement of symptoms  Report to the ER if symptoms worsen  Chief Complaint     Chief Complaint   Patient presents with   • Back Pain     Started Friday, known history of arthritis in back  Wants to make sure it is not lung  Pain started while wrestling Friday         History of Present Illness       32year old male presents for evaluation of mid-back pain radiating to lower back that started after he was \"slammed to ground\" wrestling on Friday  He denies prior injury to the area but does have a history of athritis  He denies associated paresthesias, shortness of breath, fevers, or loss of bowel/bladder function  He has been taking ibuprofen for symptoms with some relief  Back Pain  This is a new problem  The current episode started in the past 7 days  The problem occurs intermittently  The problem is unchanged  The pain is present in the thoracic spine  The quality of the pain is described as aching  The pain does not radiate  The pain is at a severity of 5/10  The pain is moderate  The pain is the same all the time   Pertinent negatives include no abdominal pain, bladder incontinence, bowel incontinence, chest pain, dysuria, fever, headaches, leg pain, numbness, paresis, paresthesias, pelvic pain, perianal " numbness, tingling, weakness or weight loss  Risk factors include recent trauma  He has tried NSAIDs for the symptoms  The treatment provided mild relief  Review of Systems   Review of Systems   Constitutional: Negative for activity change, appetite change, chills, fatigue, fever and weight loss  Respiratory: Negative for chest tightness and shortness of breath  Cardiovascular: Negative for chest pain and palpitations  Gastrointestinal: Negative for abdominal pain, bowel incontinence, constipation, diarrhea, nausea and vomiting  Genitourinary: Negative for bladder incontinence, decreased urine volume, difficulty urinating, dysuria and pelvic pain  Musculoskeletal: Positive for back pain  Negative for arthralgias and myalgias  Skin: Negative for color change and wound  Allergic/Immunologic: Negative for environmental allergies and food allergies  Neurological: Negative for tingling, weakness, numbness, headaches and paresthesias  Current Medications       Current Outpatient Medications:   •  busPIRone (BUSPAR) 5 mg tablet, , Disp: , Rfl:   •  fluticasone (FLONASE) 50 mcg/act nasal spray, 1 spray into each nostril daily (Patient not taking: Reported on 6/20/2023), Disp: 9 9 mL, Rfl: 0    Current Allergies     Allergies as of 06/20/2023 - Reviewed 06/20/2023   Allergen Reaction Noted   • Amoxicillin Hives 02/19/2020            The following portions of the patient's history were reviewed and updated as appropriate: allergies, current medications, past family history, past medical history, past social history, past surgical history and problem list      Past Medical History:   Diagnosis Date   • Psychiatric disorder     anxiety       History reviewed  No pertinent surgical history  History reviewed  No pertinent family history  Medications have been verified          Objective   /78   Pulse 60   Temp (!) 96 4 °F (35 8 °C)   Resp 16   SpO2 100%        Physical Exam Physical Exam  Vitals and nursing note reviewed  Constitutional:       General: He is awake  Appearance: Normal appearance  He is well-developed and normal weight  HENT:      Head: Normocephalic and atraumatic  Right Ear: Hearing normal       Left Ear: Hearing normal       Nose: No congestion or rhinorrhea  Mouth/Throat:      Lips: Pink  Mouth: Mucous membranes are moist       Pharynx: Oropharynx is clear  Eyes:      Conjunctiva/sclera: Conjunctivae normal    Cardiovascular:      Rate and Rhythm: Normal rate and regular rhythm  Pulses: Normal pulses  Heart sounds: Normal heart sounds  Pulmonary:      Effort: Pulmonary effort is normal       Breath sounds: Normal breath sounds  Musculoskeletal:         General: Tenderness present  No swelling or deformity  Cervical back: Normal, normal range of motion and neck supple  Thoracic back: Tenderness and bony tenderness present  No swelling, edema, deformity, signs of trauma, lacerations or spasms  Normal range of motion  No scoliosis  Lumbar back: Bony tenderness present  No swelling, edema, deformity, signs of trauma, lacerations, spasms or tenderness  Normal range of motion  No scoliosis  Back:    Skin:     General: Skin is warm and dry  Findings: No abrasion, abscess, bruising, ecchymosis, signs of injury or wound  Neurological:      Mental Status: He is alert and oriented to person, place, and time  GCS: GCS eye subscore is 4  GCS verbal subscore is 5  GCS motor subscore is 6  Cranial Nerves: Cranial nerves 2-12 are intact  Sensory: Sensation is intact  Motor: Motor function is intact  Coordination: Coordination is intact  Gait: Gait is intact  Psychiatric:         Mood and Affect: Mood normal          Speech: Speech normal          Behavior: Behavior normal  Behavior is cooperative  Thought Content:  Thought content normal          Judgment: Judgment normal

## 2023-07-03 ENCOUNTER — HOSPITAL ENCOUNTER (EMERGENCY)
Facility: HOSPITAL | Age: 28
Discharge: HOME | End: 2023-07-03
Attending: EMERGENCY MEDICINE
Payer: MEDICARE

## 2023-07-03 ENCOUNTER — APPOINTMENT (EMERGENCY)
Dept: CT IMAGING | Facility: HOSPITAL | Age: 28
End: 2023-07-03
Payer: MEDICARE

## 2023-07-03 VITALS
HEART RATE: 98 BPM | OXYGEN SATURATION: 99 % | RESPIRATION RATE: 18 BRPM | DIASTOLIC BLOOD PRESSURE: 60 MMHG | SYSTOLIC BLOOD PRESSURE: 121 MMHG | TEMPERATURE: 98.3 F

## 2023-07-03 DIAGNOSIS — R10.9 RIGHT FLANK PAIN: Primary | ICD-10-CM

## 2023-07-03 DIAGNOSIS — K57.90 DIVERTICULOSIS: ICD-10-CM

## 2023-07-03 DIAGNOSIS — K58.9 IBS (IRRITABLE BOWEL SYNDROME): ICD-10-CM

## 2023-07-03 LAB
ALBUMIN SERPL BCP-MCNC: 4.9 G/DL (ref 3.5–5)
ALP SERPL-CCNC: 89 U/L (ref 34–104)
ALT SERPL W P-5'-P-CCNC: 21 U/L (ref 7–52)
ANION GAP SERPL CALCULATED.3IONS-SCNC: 7 MMOL/L
AST SERPL W P-5'-P-CCNC: 19 U/L (ref 13–39)
BASOPHILS # BLD AUTO: 0.06 THOUSANDS/ÂΜL (ref 0–0.1)
BASOPHILS NFR BLD AUTO: 1 % (ref 0–1)
BILIRUB SERPL-MCNC: 0.56 MG/DL (ref 0.2–1)
BILIRUB UR QL STRIP: NEGATIVE
BUN SERPL-MCNC: 7 MG/DL (ref 5–25)
CALCIUM SERPL-MCNC: 10.1 MG/DL (ref 8.4–10.2)
CHLORIDE SERPL-SCNC: 106 MMOL/L (ref 96–108)
CLARITY UR: CLEAR
CO2 SERPL-SCNC: 26 MMOL/L (ref 21–32)
COLOR UR: YELLOW
CREAT SERPL-MCNC: 0.77 MG/DL (ref 0.6–1.3)
EOSINOPHIL # BLD AUTO: 0.28 THOUSAND/ÂΜL (ref 0–0.61)
EOSINOPHIL NFR BLD AUTO: 3 % (ref 0–6)
ERYTHROCYTE [DISTWIDTH] IN BLOOD BY AUTOMATED COUNT: 13.2 % (ref 11.6–15.1)
GFR SERPL CREATININE-BSD FRML MDRD: 124 ML/MIN/1.73SQ M
GLUCOSE SERPL-MCNC: 102 MG/DL (ref 65–140)
GLUCOSE UR STRIP-MCNC: NEGATIVE MG/DL
HCT VFR BLD AUTO: 47.4 % (ref 36.5–49.3)
HGB BLD-MCNC: 16.2 G/DL (ref 12–17)
HGB UR QL STRIP.AUTO: NEGATIVE
IMM GRANULOCYTES # BLD AUTO: 0.02 THOUSAND/UL (ref 0–0.2)
IMM GRANULOCYTES NFR BLD AUTO: 0 % (ref 0–2)
KETONES UR STRIP-MCNC: NEGATIVE MG/DL
LEUKOCYTE ESTERASE UR QL STRIP: NEGATIVE
LIPASE SERPL-CCNC: 23 U/L (ref 11–82)
LYMPHOCYTES # BLD AUTO: 3.38 THOUSANDS/ÂΜL (ref 0.6–4.47)
LYMPHOCYTES NFR BLD AUTO: 37 % (ref 14–44)
MCH RBC QN AUTO: 29.8 PG (ref 26.8–34.3)
MCHC RBC AUTO-ENTMCNC: 34.2 G/DL (ref 31.4–37.4)
MCV RBC AUTO: 87 FL (ref 82–98)
MONOCYTES # BLD AUTO: 0.58 THOUSAND/ÂΜL (ref 0.17–1.22)
MONOCYTES NFR BLD AUTO: 6 % (ref 4–12)
NEUTROPHILS # BLD AUTO: 4.77 THOUSANDS/ÂΜL (ref 1.85–7.62)
NEUTS SEG NFR BLD AUTO: 53 % (ref 43–75)
NITRITE UR QL STRIP: NEGATIVE
NRBC BLD AUTO-RTO: 0 /100 WBCS
PH UR STRIP.AUTO: 6 [PH]
PLATELET # BLD AUTO: 307 THOUSANDS/UL (ref 149–390)
PMV BLD AUTO: 10 FL (ref 8.9–12.7)
POTASSIUM SERPL-SCNC: 3.9 MMOL/L (ref 3.5–5.3)
PROT SERPL-MCNC: 7.4 G/DL (ref 6.4–8.4)
PROT UR STRIP-MCNC: NEGATIVE MG/DL
RBC # BLD AUTO: 5.43 MILLION/UL (ref 3.88–5.62)
SODIUM SERPL-SCNC: 139 MMOL/L (ref 135–147)
SP GR UR STRIP.AUTO: 1.02 (ref 1–1.03)
UROBILINOGEN UR STRIP-ACNC: <2 MG/DL
WBC # BLD AUTO: 9.09 THOUSAND/UL (ref 4.31–10.16)

## 2023-07-03 PROCEDURE — 96374 THER/PROPH/DIAG INJ IV PUSH: CPT

## 2023-07-03 PROCEDURE — 83690 ASSAY OF LIPASE: CPT | Performed by: PHYSICIAN ASSISTANT

## 2023-07-03 PROCEDURE — 81003 URINALYSIS AUTO W/O SCOPE: CPT | Performed by: PHYSICIAN ASSISTANT

## 2023-07-03 PROCEDURE — 80053 COMPREHEN METABOLIC PANEL: CPT | Performed by: PHYSICIAN ASSISTANT

## 2023-07-03 PROCEDURE — 96361 HYDRATE IV INFUSION ADD-ON: CPT

## 2023-07-03 PROCEDURE — 85025 COMPLETE CBC W/AUTO DIFF WBC: CPT | Performed by: PHYSICIAN ASSISTANT

## 2023-07-03 PROCEDURE — 36415 COLL VENOUS BLD VENIPUNCTURE: CPT | Performed by: PHYSICIAN ASSISTANT

## 2023-07-03 PROCEDURE — G1004 CDSM NDSC: HCPCS

## 2023-07-03 PROCEDURE — 99284 EMERGENCY DEPT VISIT MOD MDM: CPT

## 2023-07-03 PROCEDURE — 74176 CT ABD & PELVIS W/O CONTRAST: CPT

## 2023-07-03 RX ORDER — KETOROLAC TROMETHAMINE 30 MG/ML
15 INJECTION, SOLUTION INTRAMUSCULAR; INTRAVENOUS ONCE
Status: COMPLETED | OUTPATIENT
Start: 2023-07-03 | End: 2023-07-03

## 2023-07-03 RX ADMIN — KETOROLAC TROMETHAMINE 15 MG: 30 INJECTION, SOLUTION INTRAMUSCULAR at 21:08

## 2023-07-03 RX ADMIN — SODIUM CHLORIDE 1000 ML: 0.9 INJECTION, SOLUTION INTRAVENOUS at 21:08

## 2023-07-04 NOTE — ED PROVIDER NOTES
History  Chief Complaint   Patient presents with   • Flank Pain     Pt arrives ambulatory c/o R flank pain starting 2 weeks ago. Reports taking ibuprofen for pain, denies fevers. Patient is a 32years old with a past medical history of IBS and anxiety who presents to the ED today for evaluation of a 3 days history of a right flank pain which she described as intermittent sharp with constant aching sensation that is nonradiating. Admits 2 weeks history of intermittent abdominal cramping, bloating, and diarrhea. Admits history of kidney stone. Denies any hematuria, nausea, vomiting, chest pain, shortness of breath, dysuria or any other complaint on review of systems. Prior to Admission Medications   Prescriptions Last Dose Informant Patient Reported? Taking?   busPIRone (BUSPAR) 5 mg tablet   Yes No   fluticasone (FLONASE) 50 mcg/act nasal spray   No No   Si spray into each nostril daily   Patient not taking: Reported on 2023      Facility-Administered Medications: None       Past Medical History:   Diagnosis Date   • Psychiatric disorder     anxiety       History reviewed. No pertinent surgical history. History reviewed. No pertinent family history. I have reviewed and agree with the history as documented. E-Cigarette/Vaping   • E-Cigarette Use Never User      E-Cigarette/Vaping Substances   • Nicotine No    • THC No    • CBD No    • Flavoring No    • Other No    • Unknown No      Social History     Tobacco Use   • Smoking status: Some Days     Packs/day: 0.50     Types: Cigarettes   • Smokeless tobacco: Never   • Tobacco comments:     quit a week ago. Vaping Use   • Vaping Use: Never used   Substance Use Topics   • Alcohol use: Yes     Comment: occ   • Drug use: Never       Review of Systems   Constitutional: Negative for chills and fever. HENT: Negative for ear pain and sore throat. Eyes: Negative for pain and visual disturbance.    Respiratory: Negative for cough and shortness of breath. Cardiovascular: Negative for chest pain and palpitations. Gastrointestinal: Positive for abdominal pain ( Intermittent abdominal cramping), constipation and diarrhea. Negative for nausea and vomiting. Genitourinary: Positive for flank pain ( Right flank pain). Negative for dysuria, frequency, hematuria, penile swelling, scrotal swelling, testicular pain and urgency. Musculoskeletal: Negative for arthralgias and back pain. Skin: Negative for color change and rash. Neurological: Negative for seizures and syncope. All other systems reviewed and are negative. Physical Exam  Physical Exam  Vitals and nursing note reviewed. Constitutional:       General: He is not in acute distress. Appearance: He is well-developed. He is not ill-appearing or toxic-appearing. HENT:      Head: Normocephalic and atraumatic. Eyes:      Conjunctiva/sclera: Conjunctivae normal.   Cardiovascular:      Rate and Rhythm: Normal rate and regular rhythm. Heart sounds: No murmur heard. Pulmonary:      Effort: Pulmonary effort is normal. No respiratory distress. Breath sounds: Normal breath sounds. Chest:      Chest wall: No tenderness. Abdominal:      General: Bowel sounds are normal. There is no distension. Palpations: Abdomen is soft. There is no mass. Tenderness: There is no abdominal tenderness. There is right CVA tenderness. There is no left CVA tenderness. Hernia: No hernia is present. Musculoskeletal:         General: No swelling or tenderness. Normal range of motion. Cervical back: Neck supple. Right lower leg: No edema. Left lower leg: No edema. Skin:     General: Skin is warm and dry. Capillary Refill: Capillary refill takes less than 2 seconds. Neurological:      Mental Status: He is alert.    Psychiatric:         Mood and Affect: Mood normal.         Vital Signs  ED Triage Vitals [07/03/23 2033]   Temperature Pulse Respirations Blood Pressure SpO2   98.3 °F (36.8 °C) 98 18 121/60 99 %      Temp Source Heart Rate Source Patient Position - Orthostatic VS BP Location FiO2 (%)   Oral Monitor Sitting Left arm --      Pain Score       --           Vitals:    07/03/23 2033   BP: 121/60   Pulse: 98   Patient Position - Orthostatic VS: Sitting         Visual Acuity      ED Medications  Medications   ketorolac (TORADOL) injection 15 mg (15 mg Intravenous Given 7/3/23 2108)   sodium chloride 0.9 % bolus 1,000 mL (0 mL Intravenous Stopped 7/3/23 2301)       Diagnostic Studies  Results Reviewed     Procedure Component Value Units Date/Time    Lipase [[id number]]  (Normal) Collected: 07/03/23 2057    Lab Status: Final result Specimen: Blood from Arm, Right Updated: 07/03/23 2120     Lipase 23 u/L     CMP [[id number]] Collected: 07/03/23 2057    Lab Status: Final result Specimen: Blood from Arm, Right Updated: 07/03/23 2120     Sodium 139 mmol/L      Potassium 3.9 mmol/L      Chloride 106 mmol/L      CO2 26 mmol/L      ANION GAP 7 mmol/L      BUN 7 mg/dL      Creatinine 0.77 mg/dL      Glucose 102 mg/dL      Calcium 10.1 mg/dL      AST 19 U/L      ALT 21 U/L      Alkaline Phosphatase 89 U/L      Total Protein 7.4 g/dL      Albumin 4.9 g/dL      Total Bilirubin 0.56 mg/dL      eGFR 124 ml/min/1.73sq m     Narrative:      [facility] guidelines for Chronic Kidney Disease (CKD):   •  Stage 1 with normal or high GFR (GFR > 90 mL/min/1.73 square meters)  •  Stage 2 Mild CKD (GFR = 60-89 mL/min/1.73 square meters)  •  Stage 3A Moderate CKD (GFR = 45-59 mL/min/1.73 square meters)  •  Stage 3B Moderate CKD (GFR = 30-44 mL/min/1.73 square meters)  •  Stage 4 Severe CKD (GFR = 15-29 mL/min/1.73 square meters)  •  Stage 5 End Stage CKD (GFR <15 mL/min/1.73 square meters)  Note: GFR calculation is accurate only with a steady state creatinine    UA w Reflex to Microscopic w Reflex to Culture [[id number]] Collected: 07/03/23 2054    Lab Status: Final result Specimen: Urine, Clean Catch Updated: 07/03/23 2105     Color, UA Yellow     Clarity, UA Clear     Specific Gravity, UA 1.019     pH, UA 6.0     Leukocytes, UA Negative     Nitrite, UA Negative     Protein, UA Negative mg/dl      Glucose, UA Negative mg/dl      Ketones, UA Negative mg/dl      Urobilinogen, UA <2.0 mg/dl      Bilirubin, UA Negative     Occult Blood, UA Negative    CBC and differential [[id number]] Collected: 07/03/23 2057    Lab Status: Final result Specimen: Blood from Arm, Right Updated: 07/03/23 2105     WBC 9.09 Thousand/uL      RBC 5.43 Million/uL      Hemoglobin 16.2 g/dL      Hematocrit 47.4 %      MCV 87 fL      MCH 29.8 pg      MCHC 34.2 g/dL      RDW 13.2 %      MPV 10.0 fL      Platelets 519 Thousands/uL      nRBC 0 /100 WBCs      Neutrophils Relative 53 %      Immat GRANS % 0 %      Lymphocytes Relative 37 %      Monocytes Relative 6 %      Eosinophils Relative 3 %      Basophils Relative 1 %      Neutrophils Absolute 4.77 Thousands/µL      Immature Grans Absolute 0.02 Thousand/uL      Lymphocytes Absolute 3.38 Thousands/µL      Monocytes Absolute 0.58 Thousand/µL      Eosinophils Absolute 0.28 Thousand/µL      Basophils Absolute 0.06 Thousands/µL                  CT renal stone study abdomen pelvis without contrast   Final Result by Cristiana Mendez DO (07/03 2221)      Tiny nonobstructing bilateral renal calculi. Colonic diverticulosis without evidence of diverticulitis.       Limited study without IV or oral contrast.         Workstation performed: [id number]                    Procedures  Procedures         ED Course  ED Course as of 07/03/23 2348   Mon Jul 03, 2023 2226 CT renal stone study abdomen pelvis without contrast  IMPRESSION:     Tiny nonobstructing bilateral renal calculi.     Colonic diverticulosis without evidence of diverticulitis.     Limited study without IV or oral contrast.        Workstation performed: [id number]           SBIRT 20yo+    Flowsheet Row Most Recent Value   Initial Alcohol Screen: US AUDIT-C     1. How often do you have a drink containing alcohol? 0 Filed at: 07/03/2023 2041   2. How many drinks containing alcohol do you have on a typical day you are drinking? 0 Filed at: 07/03/2023 2041   3a. Male UNDER 65: How often do you have five or more drinks on one occasion? 0 Filed at: 07/03/2023 2041   Audit-C Score 0 Filed at: 07/03/2023 2041   JAMES: How many times in the past year have you. .. Used an illegal drug or used a prescription medication for non-medical reasons? Never Filed at: 07/03/2023 2041                    Medical Decision Making  Patient's history and exam finding concerning for kidney stone versus malingering versus muscle strain. Pyelonephritis versus UTI also considered. Patient was given 15 mg of Toradol and a liter of normal saline pending diagnostic work-up. Diagnostic blood work came back negative for any significant laboratory findings. CT scan of the abdomen pelvis came back showing nonobstructing bilateral kidney stone with diverticulosis with no diverticulitis. Patient reevaluated and admits feeling much better. Informed of his CT scan findings and was discharged home with instructions to follow-up with primary care physician and to return to the ED for any new or worsening of symptoms. Patient stable upon discharge. Amount and/or Complexity of Data Reviewed  Labs: ordered. Radiology: ordered. Decision-making details documented in ED Course. Risk  Prescription drug management.           Disposition  Final diagnoses:   Right flank pain   IBS (irritable bowel syndrome)   Diverticulosis     Time reflects when diagnosis was documented in both MDM as applicable and the Disposition within this note     Time User Action Codes Description Comment    7/3/2023 11:00 PM Christopher Purvis Add [R10.9] Right flank pain     7/3/2023 11:00 PM Christopher Purvis Add [K58.9] IBS (irritable bowel syndrome)     7/3/2023 11:48 PM Fennimoreguy Buhs Holley [K57.90] Diverticulosis       ED Disposition     ED Disposition   Discharge    Condition   Stable    Date/Time   Mon Jul 3, 2023 11:00 PM    Comment   Earnestine Jimenez discharge to home/self care. Follow-up Information     Follow up With Specialties Details Why [address], [place] Family Medicine Call in 1 day  [address] [place] [id number]            Discharge Medication List as of 7/3/2023 11:01 PM      CONTINUE these medications which have NOT CHANGED    Details   busPIRone (BUSPAR) 5 mg tablet Starting Thu 9/29/2022, Historical Med      fluticasone (FLONASE) 50 mcg/act nasal spray 1 spray into each nostril daily, Starting Tue 4/25/2023, Normal             No discharge procedures on file.     PDMP Review     None          ED Provider  Electronically Signed by           Garry Paredes PA-C  07/03/23 2487

## 2023-07-04 NOTE — DISCHARGE INSTRUCTIONS
Please continue supportive care including ibuprofen and or Tylenol as needed for pain. Call for follow-up with your primary care physician within the next available appointment. Return to the ED for any new or worsening of symptoms.

## 2023-08-14 ENCOUNTER — OFFICE VISIT (OUTPATIENT)
Dept: URGENT CARE | Facility: CLINIC | Age: 28
End: 2023-08-14
Payer: MEDICARE

## 2023-08-14 VITALS
OXYGEN SATURATION: 98 % | DIASTOLIC BLOOD PRESSURE: 76 MMHG | RESPIRATION RATE: 18 BRPM | TEMPERATURE: 98 F | WEIGHT: 130 LBS | SYSTOLIC BLOOD PRESSURE: 113 MMHG | BODY MASS INDEX: 20.89 KG/M2 | HEART RATE: 59 BPM | HEIGHT: 66 IN

## 2023-08-14 DIAGNOSIS — K04.7 DENTAL INFECTION: Primary | ICD-10-CM

## 2023-08-14 PROCEDURE — 99213 OFFICE O/P EST LOW 20 MIN: CPT | Performed by: FAMILY MEDICINE

## 2023-08-14 PROCEDURE — G0463 HOSPITAL OUTPT CLINIC VISIT: HCPCS | Performed by: FAMILY MEDICINE

## 2023-08-14 RX ORDER — CLINDAMYCIN HYDROCHLORIDE 300 MG/1
300 CAPSULE ORAL 4 TIMES DAILY
Qty: 28 CAPSULE | Refills: 0 | Status: SHIPPED | OUTPATIENT
Start: 2023-08-14 | End: 2023-08-21

## 2023-08-14 NOTE — LETTER
August 14, 2023     Patient: Mario Crow   YOB: 1995   Date of Visit: 8/14/2023       To Whom it May Concern:    Mario Crow was seen in my clinic on 8/14/2023. If you have any questions or concerns, please don't hesitate to call.          Sincerely,          Renan Lou,         CC: No Recipients

## 2023-08-14 NOTE — PROGRESS NOTES
[facility]        NAME: Frances Chamberlain is a 32 y.o. male  : 1995    MRN: [id number]  DATE: 2023  TIME: 10:37 AM    Assessment and Plan   Dental infection [K04.7]  1. Dental infection  clindamycin (CLEOCIN) 300 MG capsule            Patient Instructions     Continue ibuprofen for pain. Establish care with a [facility] primary care provider, work with them to possibly get into oral surgery more quickly. Follow up with PCP in 3-5 days. Proceed to  ER if symptoms worsen. Chief Complaint     Chief Complaint   Patient presents with   • Facial Pain     COUPLE DAY SINUS pain in face and behind head, Toothache could be causing it. History of Present Illness       Facial Pain (COUPLE DAY SINUS pain in face and behind head, Toothache could be causing it. )  Patient cannot get appointment with oral surgery for 6 months. Upper first molar on the left      Review of Systems   Review of Systems   Constitutional: Negative. HENT: Positive for dental problem and sinus pressure. Respiratory: Negative. Cardiovascular: Negative.           Current Medications       Current Outpatient Medications:   •  busPIRone (BUSPAR) 5 mg tablet, , Disp: , Rfl:   •  clindamycin (CLEOCIN) 300 MG capsule, Take 1 capsule (300 mg total) by mouth 4 (four) times a day for 7 days, Disp: 28 capsule, Rfl: 0  •  fluticasone (FLONASE) 50 mcg/act nasal spray, 1 spray into each nostril daily (Patient not taking: Reported on 2023), Disp: 9.9 mL, Rfl: 0    Current Allergies     Allergies as of 2023 - Reviewed 2023   Allergen Reaction Noted   • Amoxicillin Hives 2020            The following portions of the patient's history were reviewed and updated as appropriate: allergies, current medications, past family history, past medical history, past social history, past surgical history and problem list.     Past Medical History:   Diagnosis Date   • Psychiatric disorder     anxiety       Past Surgical History:   Procedure Laterality Date   • NO PAST SURGERIES         History reviewed. No pertinent family history. Medications have been verified. Objective   /76   Pulse 59   Temp 98 °F (36.7 °C)   Resp 18   Ht 5' 6" (1.676 m)   Wt 59 kg (130 lb)   SpO2 98%   BMI 20.98 kg/m²   No LMP for male patient. Physical Exam     Physical Exam  Vitals and nursing note reviewed. Constitutional:       Appearance: Normal appearance. He is well-developed. HENT:      Right Ear: External ear normal.      Left Ear: External ear normal.      Nose: Nose normal.      Mouth/Throat:      Pharynx: No oropharyngeal exudate. Comments: First molar broke off at the gum. The gum is red and swollen and tender. Eyes:      Conjunctiva/sclera: Conjunctivae normal.   Cardiovascular:      Rate and Rhythm: Normal rate and regular rhythm. Heart sounds: Normal heart sounds. No murmur heard. Pulmonary:      Effort: Pulmonary effort is normal. No respiratory distress. Breath sounds: Normal breath sounds. No wheezing or rales. Chest:      Chest wall: No tenderness. Musculoskeletal:      Cervical back: Normal range of motion and neck supple. Lymphadenopathy:      Cervical: No cervical adenopathy. Neurological:      Mental Status: He is alert.

## 2023-08-14 NOTE — PATIENT INSTRUCTIONS
Continue ibuprofen for pain. Establish care with a [facility] primary care provider, work with them to possibly get into oral surgery more quickly. Follow up with PCP in 3-5 days. Proceed to  ER if symptoms worsen.

## 2023-08-16 ENCOUNTER — TELEPHONE (OUTPATIENT)
Dept: DERMATOLOGY | Facility: CLINIC | Age: 28
End: 2023-08-16

## 2023-08-16 NOTE — TELEPHONE ENCOUNTER
NP calling for genital herpes breakout, pt is current pt of [facility] who cannot get him an apt until December and not giving prescriptions (probably hasn't been seen in over 1 yr) and was looking for NP apt. Informed him we are fully booked until end of year, but can put on waitlist for possible cancellation (checked for cancellations, nothing at this time) and to cb in mid/end September to check about scheduling for 2024. Pt will contact PCP for possible apt/prescription.

## 2023-08-23 ENCOUNTER — OFFICE VISIT (OUTPATIENT)
Dept: URGENT CARE | Facility: CLINIC | Age: 28
End: 2023-08-23
Payer: MEDICARE

## 2023-08-23 VITALS
HEART RATE: 59 BPM | TEMPERATURE: 97.9 F | SYSTOLIC BLOOD PRESSURE: 98 MMHG | DIASTOLIC BLOOD PRESSURE: 56 MMHG | OXYGEN SATURATION: 100 % | RESPIRATION RATE: 16 BRPM

## 2023-08-23 DIAGNOSIS — J34.89 SINUS PRESSURE: Primary | ICD-10-CM

## 2023-08-23 PROCEDURE — 99213 OFFICE O/P EST LOW 20 MIN: CPT | Performed by: PHYSICIAN ASSISTANT

## 2023-08-23 PROCEDURE — G0463 HOSPITAL OUTPT CLINIC VISIT: HCPCS | Performed by: PHYSICIAN ASSISTANT

## 2023-08-23 RX ORDER — CLINDAMYCIN HYDROCHLORIDE 300 MG/1
300 CAPSULE ORAL 3 TIMES DAILY
COMMUNITY
Start: 2023-08-17 | End: 2023-08-26

## 2023-08-23 NOTE — PROGRESS NOTES
[facility]        NAME: Manuel Fleming is a 32 y.o. male  : 1995    MRN: [id number]  DATE: 2023  TIME: 5:44 PM    Assessment and Plan   Sinus pressure [J34.89]  1. Sinus pressure              Patient Instructions   Discussed with patient that sinus pressure could be due to dentition. I advised that he finish clindamycin and take ibuprofen as needed for discomfort. Should patient develop increased pain, increased headache, swelling of his gums or cheek, fevers he should report to the ER. Follow up with PCP in 3-5 days. Proceed to  ER if symptoms worsen. Chief Complaint     Chief Complaint   Patient presents with   • Cold Like Symptoms     STARTED WITH A TOOTHACHE LAST WEEK. BUT NOW HAVING FACIAL AND SINUS SWELLING         History of Present Illness       HPI  This is a 49-year-old male here complaining of sinus pressure which radiates into his head and down the back of his neck. Patient was seen on  for dental infection and was prescribed clindamycin which she continues to take. He denies nasal congestion. He rates his headache a 4 out of 10 and denies any confusion or visual changes. He denies any fever chills he denies any shortness of breath or chest pain. He denies vomiting or diarrhea. Patient has seen the dentist and was told that she was needed to be extracted however he cannot get an appointment until December. Patient notes when taking ibuprofen the headache neck pain and sinus pain resolved. Review of Systems   Review of Systems   Constitutional: Negative for chills and fever. HENT: Positive for sinus pressure. Negative for congestion, ear pain and sore throat. Eyes: Negative for visual disturbance. Respiratory: Negative for shortness of breath. Cardiovascular: Negative for chest pain. Gastrointestinal: Negative for diarrhea and vomiting. Neurological: Positive for headaches. Psychiatric/Behavioral: Negative for confusion.          Current Medications       Current Outpatient Medications:   •  busPIRone (BUSPAR) 5 mg tablet, , Disp: , Rfl:   •  clindamycin (CLEOCIN) 300 MG capsule, Take 300 mg by mouth Three times a day, Disp: , Rfl:   •  fluticasone (FLONASE) 50 mcg/act nasal spray, 1 spray into each nostril daily, Disp: 9.9 mL, Rfl: 0    Current Allergies     Allergies as of 08/23/2023 - Reviewed 08/23/2023   Allergen Reaction Noted   • Amoxicillin Hives 02/19/2020            The following portions of the patient's history were reviewed and updated as appropriate: allergies, current medications, past family history, past medical history, past social history, past surgical history and problem list.     Past Medical History:   Diagnosis Date   • Psychiatric disorder     anxiety       Past Surgical History:   Procedure Laterality Date   • NO PAST SURGERIES         No family history on file. Medications have been verified. Objective   BP 98/56   Pulse 59   Temp 97.9 °F (36.6 °C)   Resp 16   SpO2 100%        Physical Exam     Physical Exam  Vitals and nursing note reviewed. Constitutional:       General: He is not in acute distress. Appearance: Normal appearance. He is normal weight. He is not toxic-appearing. HENT:      Right Ear: Hearing, tympanic membrane, ear canal and external ear normal.      Left Ear: Hearing, tympanic membrane, ear canal and external ear normal.      Nose: Nose normal.      Right Sinus: No maxillary sinus tenderness or frontal sinus tenderness. Left Sinus: No maxillary sinus tenderness or frontal sinus tenderness. Mouth/Throat:      Mouth: Mucous membranes are moist.      Dentition: Abnormal dentition. Dental tenderness and dental caries present. No gingival swelling or dental abscesses. Cardiovascular:      Rate and Rhythm: Normal rate and regular rhythm. Pulmonary:      Effort: Pulmonary effort is normal.      Breath sounds: Normal breath sounds.    Lymphadenopathy:      Head:      Right side of head: No preauricular, posterior auricular or occipital adenopathy. Left side of head: No preauricular, posterior auricular or occipital adenopathy. Cervical: No cervical adenopathy. Neurological:      Mental Status: He is alert.

## 2023-08-24 ENCOUNTER — HOSPITAL ENCOUNTER (EMERGENCY)
Facility: HOSPITAL | Age: 28
Discharge: HOME | End: 2023-08-25
Attending: EMERGENCY MEDICINE
Payer: MEDICARE

## 2023-08-24 VITALS
HEART RATE: 57 BPM | SYSTOLIC BLOOD PRESSURE: 128 MMHG | TEMPERATURE: 98.1 F | RESPIRATION RATE: 18 BRPM | HEIGHT: 66 IN | DIASTOLIC BLOOD PRESSURE: 78 MMHG | WEIGHT: 135 LBS | OXYGEN SATURATION: 99 % | BODY MASS INDEX: 21.69 KG/M2

## 2023-08-24 DIAGNOSIS — K08.89 PAIN, DENTAL: Primary | ICD-10-CM

## 2023-08-24 PROCEDURE — 99282 EMERGENCY DEPT VISIT SF MDM: CPT

## 2023-08-25 PROCEDURE — 99284 EMERGENCY DEPT VISIT MOD MDM: CPT

## 2023-08-25 NOTE — ED PROVIDER NOTES
History  Chief Complaint   Patient presents with   • Dental Pain     Currently on clindamycin for left upper wisdom tooth. Patient reports their pain has improved since starting antibiotics but was sent in by PCP to see if infection has spread. Patient reports "I am not sure why they didn't just see me in the office". 19-year-old male presents ED for evaluation of dental pain. Patient has been having dental pain for several weeks. He has been taking clindamycin since  for treatment of suspected dental infection. Patient admits that he can at times to be inconsistent with taking clindamycin doses. He has been in contact with an oral surgeon office however he states that his appointment would not be obtainable for another 6 months. The pain is located in the left upper molar region. Patient was sent to ED by primary care provider who did a televisit with patient and wanted to patient to be seen due to describing sinus pressure pain, neck pain. Concern over spread of dental infection. Patient describes the sinus pain as being in the frontal sinus region. He has history of seasonal allergies and feels that is similar to seasonal allergy type pressure. Patient denies neck rigidity. Patient denies sick contacts. Denies fever, chills, nausea, vomiting, abdominal pain, pain with urination, syncope, seizure. Prior to Admission Medications   Prescriptions Last Dose Informant Patient Reported? Taking?   busPIRone (BUSPAR) 5 mg tablet   Yes No   clindamycin (CLEOCIN) 300 MG capsule   Yes No   Sig: Take 300 mg by mouth Three times a day   fluticasone (FLONASE) 50 mcg/act nasal spray   No No   Si spray into each nostril daily      Facility-Administered Medications: None       Past Medical History:   Diagnosis Date   • Psychiatric disorder     anxiety       Past Surgical History:   Procedure Laterality Date   • NO PAST SURGERIES         History reviewed. No pertinent family history.   I have reviewed and agree with the history as documented. E-Cigarette/Vaping   • E-Cigarette Use Never User      E-Cigarette/Vaping Substances   • Nicotine No    • THC No    • CBD No    • Flavoring No    • Other No    • Unknown No      Social History     Tobacco Use   • Smoking status: Some Days     Packs/day: 0.50     Types: Cigarettes   • Smokeless tobacco: Never   • Tobacco comments:     quit a week ago. Vaping Use   • Vaping Use: Never used   Substance Use Topics   • Alcohol use: Yes     Comment: occ   • Drug use: Never       Review of Systems   Constitutional: Negative for chills, diaphoresis, fatigue and fever. HENT: Positive for congestion, dental problem, sinus pressure and sinus pain. Negative for ear discharge, ear pain and sore throat. Eyes: Negative for photophobia, pain, discharge, redness, itching and visual disturbance. Respiratory: Negative for cough, shortness of breath, wheezing and stridor. Cardiovascular: Negative for chest pain and palpitations. Gastrointestinal: Negative for abdominal pain and vomiting. Genitourinary: Negative for dysuria, flank pain, frequency, genital sores and hematuria. Musculoskeletal: Positive for neck stiffness. Negative for arthralgias and back pain. Skin: Negative for color change and rash. Neurological: Negative for seizures and syncope. All other systems reviewed and are negative. Physical Exam  Physical Exam  Vitals and nursing note reviewed. Constitutional:       General: He is not in acute distress. Appearance: Normal appearance. He is well-developed. He is not ill-appearing, toxic-appearing or diaphoretic. HENT:      Head: Normocephalic and atraumatic. Mouth/Throat:      Mouth: Mucous membranes are moist.      Dentition: Abnormal dentition. Dental tenderness and dental caries present. No dental abscesses or gum lesions. Pharynx: Oropharynx is clear. No oropharyngeal exudate or posterior oropharyngeal erythema. Tonsils: No tonsillar exudate or tonsillar abscesses. Comments: Please inspection of patient's oral cavity demonstrates several teeth with various stages of erosion. Specific tooth that is causing pain to patient marked on diagram.  No sign of pustular abscess seen at tooth. No masses, lesions. Area is tender to touch with probe. No expressible discharge. Eyes:      Conjunctiva/sclera: Conjunctivae normal.   Neck:      Comments: Full flexion extension of neck. Negative brudzinski, Kernig sign. Cardiovascular:      Rate and Rhythm: Normal rate and regular rhythm. Pulses: Normal pulses. Heart sounds: Normal heart sounds. No murmur heard. No friction rub. No gallop. Pulmonary:      Effort: Pulmonary effort is normal. No respiratory distress. Breath sounds: Normal breath sounds. No stridor. No wheezing, rhonchi or rales. Abdominal:      Palpations: Abdomen is soft. Tenderness: There is no abdominal tenderness. Musculoskeletal:         General: No swelling. Cervical back: No edema, erythema, rigidity or crepitus. No spinous process tenderness or muscular tenderness. Normal range of motion. Skin:     General: Skin is warm and dry. Capillary Refill: Capillary refill takes less than 2 seconds. Neurological:      Mental Status: He is alert.    Psychiatric:         Mood and Affect: Mood normal.         Vital Signs  ED Triage Vitals [08/24/23 2335]   Temperature Pulse Respirations Blood Pressure SpO2   98.1 °F (36.7 °C) 57 18 128/78 99 %      Temp Source Heart Rate Source Patient Position - Orthostatic VS BP Location FiO2 (%)   Oral Monitor Sitting Left arm --      Pain Score       --           Vitals:    08/24/23 2335   BP: 128/78   Pulse: 57   Patient Position - Orthostatic VS: Sitting         Visual Acuity      ED Medications  Medications - No data to display    Diagnostic Studies  Results Reviewed     None                 No orders to display Procedures  Procedures         ED Course                               SBIRT 22yo+    Flowsheet Row Most Recent Value   Initial Alcohol Screen: US AUDIT-C     1. How often do you have a drink containing alcohol? 0 Filed at: 08/24/2023 2354   2. How many drinks containing alcohol do you have on a typical day you are drinking? 0 Filed at: 08/24/2023 2354   3a. Male UNDER 65: How often do you have five or more drinks on one occasion? 0 Filed at: 08/24/2023 2354   Audit-C Score 0 Filed at: 08/24/2023 2354   JAMES: How many times in the past year have you. .. Used an illegal drug or used a prescription medication for non-medical reasons? Never Filed at: 08/24/2023 2354                    Medical Decision Making  77-year-old male presents ED for evaluation of dental pain. He was sent to ED at the guidance of his primary care doctor due to concern over spread of dental infection. Patient told primary care provider that he was experiencing neck pain, frontal sinus pain, congestion. Patient has been taking clindamycin since August 14, 2023 for dental infection. On physical examination, patient does not have reduced range of motion of the neck. Negative meningmus signs. He is afebrile, normotensive, nontachycardic, without respiratory distress. Did not see any abscesses in oral cavity or at specific site of dental pain. Unable to express pus from site of dental pain. Do not suspect patient is experiencing spread of dental infection. Suspect patient is having dental pain from known dental infection along with seasonal allergies. He has frontal sinus pain consistent with previous seasonal allergy sinus pressure. His congestion is also consistent with seasonal allergy. Patient stable for discharge. Patient has seasonal allergy regiment that he follows consisting of antihistamine. Advised patient to continue taking clindamycin as directed by prescriber.   He should monitor his symptoms for signs of worsening of dental infection/spread into neurologic spaces including severe headache, inability to flex and extend neck, severe fatigue, altered mental status, etc..  Patient expresses understanding of this. Advised patient to return to ED for new or worsening symptoms. He can take Tylenol and ibuprofen for symptomatic relief. Encourage patient to follow-up with his dental surgeon as directed for appropriate management of dental infection to help reduce long-term dental pain. Prior to discharge, discharge instructions were discussed with patient at bedside. Patient was provided both verbal and written instructions. Patient is understanding of the discharge instructions and is agreeable to plan of care. Return precautions were discussed with patient bedside, patient verbalized understanding of signs and symptoms that would necessitate return to the ED. All questions were answered. Patient was comfortable with the plan of care and discharged to home. Disposition  Final diagnoses:   Pain, dental     Time reflects when diagnosis was documented in both MDM as applicable and the Disposition within this note     Time User Action Codes Description Comment    8/25/2023  1:32 AM Mitchell Babb [K08.89] Pain, dental       ED Disposition     ED Disposition   Discharge    Condition   Stable    Date/Time   Fri Aug 25, 2023  1:32 AM    Comment   Wil Connolly discharge to home/self care.                Follow-up Information     Follow up With Specialties Details Why [address], [place] [facility]   [address]  [phone]            Discharge Medication List as of 8/25/2023  1:34 AM      CONTINUE these medications which have NOT CHANGED    Details   busPIRone (BUSPAR) 5 mg tablet Starting Thu 9/29/2022, Historical Med      clindamycin (CLEOCIN) 300 MG capsule Take 300 mg by mouth Three times a day, Starting Thu 8/17/2023, Until Sat 8/26/2023, Historical Med fluticasone (FLONASE) 50 mcg/act nasal spray 1 spray into each nostril daily, Starting Tue 4/25/2023, Normal             No discharge procedures on file.     PDMP Review     None          ED Provider  Electronically Signed by           Shelia Aponte PA-C  08/25/23 [id number]

## 2023-08-25 NOTE — DISCHARGE INSTRUCTIONS
Continue to take clindamycin as directed by prescriber. You can also take Tylenol and ibuprofen for symptomatic relief. Follow-up with oral surgeon for further treatment of dental caries. Return to ED for new or worsening symptoms.

## 2023-10-16 ENCOUNTER — OFFICE VISIT (OUTPATIENT)
Dept: URGENT CARE | Facility: CLINIC | Age: 28
End: 2023-10-16
Payer: MEDICARE

## 2023-10-16 VITALS
BODY MASS INDEX: 20.82 KG/M2 | SYSTOLIC BLOOD PRESSURE: 98 MMHG | TEMPERATURE: 97.9 F | OXYGEN SATURATION: 97 % | HEART RATE: 97 BPM | RESPIRATION RATE: 18 BRPM | DIASTOLIC BLOOD PRESSURE: 66 MMHG | WEIGHT: 129 LBS

## 2023-10-16 DIAGNOSIS — S29.012A STRAIN OF THORACIC BACK REGION: ICD-10-CM

## 2023-10-16 DIAGNOSIS — R11.0 NAUSEA: ICD-10-CM

## 2023-10-16 DIAGNOSIS — S39.012A STRAIN OF MUSCLE, FASCIA AND TENDON OF LOWER BACK, INITIAL ENCOUNTER: Primary | ICD-10-CM

## 2023-10-16 DIAGNOSIS — Z87.19 HISTORY OF IBS: ICD-10-CM

## 2023-10-16 PROCEDURE — G0463 HOSPITAL OUTPT CLINIC VISIT: HCPCS

## 2023-10-16 PROCEDURE — 99213 OFFICE O/P EST LOW 20 MIN: CPT

## 2023-10-16 RX ORDER — ONDANSETRON 4 MG/1
4 TABLET, FILM COATED ORAL EVERY 8 HOURS PRN
Qty: 20 TABLET | Refills: 0 | Status: SHIPPED | OUTPATIENT
Start: 2023-10-16 | End: 2023-10-19

## 2023-10-16 RX ORDER — CYCLOBENZAPRINE HCL 10 MG
10 TABLET ORAL 2 TIMES DAILY PRN
Qty: 21 TABLET | Refills: 0 | Status: SHIPPED | OUTPATIENT
Start: 2023-10-16

## 2023-10-16 NOTE — PATIENT INSTRUCTIONS
Take prescribed medication as instructed. Do not take prior to working or driving/operating machinery. Tylenol/ibuprofen. Continue with prescribed famotidine as instructed by your family doctor. Recommended warm compresses to the low back as discussed. Do not fall asleep with heating pad on her low back. Avoid spicy, greasy foods, caffeine, chocolate, soda/carbonated beverages, mint, and avoid laying down after eating for at least 30 to 45 minutes. Make sure to stay well-hydrated. Increase fiber intake. May try over-the-counter stool softener if constipation continues. Follow-up with your family doctor/GI specialist for chronic IBS. If you develop any severe or worsening abdominal pain-go to the emergency room immediately. Follow up with PCP in 3-5 days. Proceed to  ER if symptoms worsen. Constipation   WHAT YOU NEED TO KNOW:   Constipation means you have hard, dry bowel movements, or you go longer than usual between bowel movements. DISCHARGE INSTRUCTIONS:   Call your doctor if:   You have blood in your bowel movements. You have a fever and abdominal pain with the constipation. Your constipation gets worse. You start to vomit. You have questions or concerns about your condition or care. Medicines:   Medicine  such as a laxative may help relax and loosen your intestines to help you have a bowel movement. Your provider may recommend you only use laxatives for a short time. Long-term use can damage your bowel function over time. Take your medicine as directed. Contact your healthcare provider if you think your medicine is not helping or if you have side effects. Tell your provider if you are allergic to any medicine. Keep a list of the medicines, vitamins, and herbs you take. Include the amounts, and when and why you take them. Bring the list or the pill bottles to follow-up visits. Carry your medicine list with you in case of an emergency.     Relieve constipation:   A suppository may be used to help soften your bowel movements. This may make them easier to pass. A suppository is guided into your rectum through your anus. An enema  is liquid medicine used to clear bowel movement from your rectum. The medicine is put into your rectum through your anus. Prevent constipation:   Drink liquids as directed. You may need to drink extra liquids to help soften and move your bowels. Ask how much liquid to drink each day and which liquids are best for you. Eat high-fiber foods. This may help decrease constipation by adding bulk to your bowel movements. High-fiber foods include fruit, vegetables, whole-grain breads and cereals, and beans. Your healthcare provider or dietitian can help you create a high-fiber meal plan. Your provider may also recommend a fiber supplement if you cannot get enough fiber from food. Exercise regularly. Regular physical activity can help stimulate your intestines. Walking is a good exercise to prevent or relieve constipation. Ask which exercises are best for you. Schedule a time each day to have a bowel movement. This may help train your body to have regular bowel movements. Bend forward while you are on the toilet to help move the bowel movement out. Sit on the toilet for at least 10 minutes, even if you do not have a bowel movement. Talk to your healthcare provider about your medicines. Certain medicines, such as opioids, can cause constipation. Your provider may be able to make medicine changes. For example, he or she may change the kind of medicine, or change when you take it. Follow up with your doctor as directed:  Write down your questions so you remember to ask them during your visits. © Copyright [employer] 2023 Information is for End User's use only and may not be sold, redistributed or otherwise used for commercial purposes. The above information is an  only.  It is not intended as medical advice for individual conditions or treatments. Talk to your doctor, nurse or pharmacist before following any medical regimen to see if it is safe and effective for you. Acute Nausea and Vomiting   WHAT YOU NEED TO KNOW:   Acute means the nausea and vomiting starts suddenly, gets worse quickly, and lasts a short time. There are many possible causes of acute nausea and vomiting. DISCHARGE INSTRUCTIONS:   Call your local emergency number (911 in the [address]) if:   You have chest pain. You have severe pain or cramping in your abdomen. Your vision is blurred. You are confused, have a high fever, or a stiff neck. You have bright red blood coming from your rectum. Your vomit smells like bowel movement. Return to the emergency department if:   You have a severe headache or pain. You are dizzy, cold, and thirsty, and your eyes and mouth are dry. You are urinating very little or not at all. You are dizzy or lightheaded when you stand up. You see blood or material that looks like coffee grounds in your vomit. Call your doctor if:   You continue to vomit for more than 48 hours. Your nausea and vomiting does not get better or go away after you use medicine. You have questions or concerns about your condition or care. Medicines: You may need any of the following:  Medicines  may be given to calm your stomach and stop your vomiting. You may also need medicines to help empty your stomach and bowels. Take your medicine as directed. Contact your healthcare provider if you think your medicine is not helping or if you have side effects. Tell your provider if you are allergic to any medicine. Keep a list of the medicines, vitamins, and herbs you take. Include the amounts, and when and why you take them. Bring the list or the pill bottles to follow-up visits. Carry your medicine list with you in case of an emergency. Manage your symptoms:   Rest as much as you can.   Too much activity can make your nausea worse. Drink more liquids to prevent dehydration. Take small sips. Try drinks such as ginger ale, lemonade, water, or tea. Your provider may recommend that you drink an oral rehydration solution (ORS). ORS contains water, salts, and sugar that are needed to replace the lost body fluids. Eat smaller meals, more often. Try bland foods and avoid spices or strong flavors    Do not drink alcohol. Alcohol may upset or irritate your stomach. Follow up with your doctor as directed:  Write down your questions so you remember to ask them during your visits. © Copyright [facility] 2023 Information is for End User's use only and may not be sold, redistributed or otherwise used for commercial purposes. The above information is an  only. It is not intended as medical advice for individual conditions or treatments. Talk to your doctor, nurse or pharmacist before following any medical regimen to see if it is safe and effective for you. Abdominal Pain   WHAT YOU NEED TO KNOW:   Abdominal pain can be dull, achy, or sharp. You may have pain in one area of your abdomen, or in your entire abdomen. Your pain may be caused by a condition such as constipation, food sensitivity or poisoning, infection, or a blockage. Abdominal pain can also be from a hernia, appendicitis, or an ulcer. Liver, gallbladder, or kidney conditions can also cause abdominal pain. The cause of your abdominal pain may not be known. DISCHARGE INSTRUCTIONS:   Call your local emergency number (911 in the [address]) if:   You have chest pain or shortness of breath. Return to the emergency department if:   You have pulsing pain in your upper abdomen or lower back that suddenly becomes constant. Your pain is in the right lower abdominal area and worsens with movement. You have a fever over 100.4°F (38°C) or shaking chills. You are vomiting and cannot keep food or liquids down.     Your pain does not improve or gets worse over the next 8 to 12 hours. You see blood in your vomit or bowel movements, or they look black and tarry. Your skin or the whites of your eyes turn yellow. You are a woman and have a large amount of vaginal bleeding that is not your monthly period. Call your doctor if:   You have pain in your lower back. You are a man and have pain in your testicles. You have pain when you urinate. You have questions or concerns about your condition or care. Medicines: You may need any of the following:  Medicines  may be given to calm your stomach or prevent vomiting. Prescription pain medicine  may be given. Ask your healthcare provider how to take this medicine safely. Some prescription pain medicines contain acetaminophen. Do not take other medicines that contain acetaminophen without talking to your healthcare provider. Too much acetaminophen may cause liver damage. Prescription pain medicine may cause constipation. Ask your healthcare provider how to prevent or treat constipation. Take your medicine as directed. Contact your healthcare provider if you think your medicine is not helping or if you have side effects. Tell your provider if you are allergic to any medicine. Keep a list of the medicines, vitamins, and herbs you take. Include the amounts, and when and why you take them. Bring the list or the pill bottles to follow-up visits. Carry your medicine list with you in case of an emergency. Manage or prevent abdominal pain:   Apply heat  on your abdomen for 20 to 30 minutes every 2 hours for as many days as directed. Heat helps decrease pain and muscle spasms. Make changes to the foods you eat, if needed. Do not eat foods that cause abdominal pain or other symptoms. Eat small meals more often. The following changes may also help:    Eat more high-fiber foods if you are constipated. High-fiber foods include fruits, vegetables, whole-grain foods, and legumes such as birch beans.          Do not eat foods that cause gas if you have bloating. Examples include broccoli, cabbage, beans, and carbonated drinks. Do not eat foods or drinks that contain sorbitol or fructose if you have diarrhea and bloating. Some examples are fruit juices, candy, jelly, and sugar-free gum. Do not eat high-fat foods. Examples include fried foods, cheeseburgers, hot dogs, and desserts. Make changes to the liquids you drink, if needed. Do not drink liquids that cause pain or make it worse, such as orange juice. Drink liquids throughout the day to stay hydrated. The following changes may also help:    Drink more liquids to prevent dehydration from diarrhea or vomiting. Ask your healthcare provider how much liquid to drink each day and which liquids are best for you. Limit or do not have caffeine. Caffeine may make symptoms such as heartburn or nausea worse. Limit or do not drink alcohol. Alcohol can make your abdominal pain worse. Ask your healthcare provider if it is okay for you to drink alcohol. Also ask how much is okay for you to drink. A drink of alcohol is 12 ounces of beer, ½ ounce of liquor, or 5 ounces of wine. Keep a diary of your abdominal pain. A diary may help your healthcare provider learn what is causing your pain. Include when the pain happens, how long it lasts, and what the pain feels like. Write down any other symptoms you have with abdominal pain. Also write down what you eat, and any symptoms you have after you eat. Manage stress. Stress may cause abdominal pain. Your healthcare provider may recommend relaxation techniques and deep breathing exercises to help decrease your stress. Your healthcare provider may recommend you talk to someone about your stress or anxiety, such as a counselor or a friend. Get plenty of sleep. Exercise regularly. Do not smoke. Nicotine and other chemicals in cigarettes can damage your esophagus and stomach.  Ask your healthcare provider for information if you currently smoke and need help to quit. E-cigarettes or smokeless tobacco still contain nicotine. Talk to your healthcare provider before you use these products. Follow up with your doctor as directed:  Write down your questions so you remember to ask them during your visits. © Copyright Eric Joshi 2023 Information is for End User's use only and may not be sold, redistributed or otherwise used for commercial purposes. The above information is an  only. It is not intended as medical advice for individual conditions or treatments. Talk to your doctor, nurse or pharmacist before following any medical regimen to see if it is safe and effective for you. Low Back Strain   WHAT YOU NEED TO KNOW:   Low back strain is an injury to your lower back muscles or tendons. Tendons are strong tissues that connect muscles to bones. The lower back supports most of your body weight and helps you move, twist, and bend. DISCHARGE INSTRUCTIONS:   Return to the emergency department if:   You hear or feel a pop in your lower back. You have increased swelling or pain in your lower back. You have trouble moving your legs. Your legs are numb. Call your doctor if:   You have a fever. Your pain does not go away, even after treatment. You have questions or concerns about your condition or care. Medicines: The following medicines may be ordered by your healthcare provider:  Acetaminophen  decreases pain and fever. It is available without a doctor's order. Ask how much to take and how often to take it. Follow directions. Read the labels of all other medicines you are using to see if they also contain acetaminophen, or ask your doctor or pharmacist. Acetaminophen can cause liver damage if not taken correctly. NSAIDs , such as ibuprofen, help decrease swelling, pain, and fever. This medicine is available with or without a doctor's order.  NSAIDs can cause stomach bleeding or kidney problems in certain people. If you take blood thinner medicine, always ask your healthcare provider if NSAIDs are safe for you. Always read the medicine label and follow directions. Muscle relaxers  help decrease pain and muscle spasms. Prescription pain medicine  may be given. Ask your healthcare provider how to take this medicine safely. Some prescription pain medicines contain acetaminophen. Do not take other medicines that contain acetaminophen without talking to your healthcare provider. Too much acetaminophen may cause liver damage. Prescription pain medicine may cause constipation. Ask your healthcare provider how to prevent or treat constipation. Take your medicine as directed. Contact your healthcare provider if you think your medicine is not helping or if you have side effects. Tell your provider if you are allergic to any medicine. Keep a list of the medicines, vitamins, and herbs you take. Include the amounts, and when and why you take them. Bring the list or the pill bottles to follow-up visits. Carry your medicine list with you in case of an emergency. Self-care:   Rest  as directed. You may need to rest in bed for a period of time after your injury. Do not lift heavy objects. Apply ice  on your back for 15 to 20 minutes every hour or as directed. Use an ice pack, or put crushed ice in a plastic bag. Cover it with a towel. Ice helps prevent tissue damage and decreases swelling and pain. Apply heat  on your lower back for 20 to 30 minutes every 2 hours for as many days as directed. Heat helps decrease pain and muscle spasms. Slowly start to increase your activity  as the pain decreases, or as directed. Prevent another low back strain:   Use correct body movements. Bend at the hips and knees when you  objects. Do not bend from the waist. Use your leg muscles as you lift the load. Do not use your back. Keep the object close to your chest as you lift it.  Try not to twist or lift anything above your waist.         Change your position often when you stand for long periods of time. Rest one foot on a small box or footrest, and then switch to the other foot often. Try not to sit for long periods of time. When you do, sit in a straight-backed chair with your feet flat on the floor. Never reach, pull, or push while you are sitting. Warm up before you exercise. Do exercises that strengthen your back muscles. Ask your healthcare provider about the best exercise plan for you. Maintain a healthy weight. Ask your healthcare provider how much you should weigh. Ask him to help you create a weight loss plan if you are overweight. © Copyright [employer] 2023 Information is for End User's use only and may not be sold, redistributed or otherwise used for commercial purposes. The above information is an  only. It is not intended as medical advice for individual conditions or treatments. Talk to your doctor, nurse or pharmacist before following any medical regimen to see if it is safe and effective for you.

## 2023-10-16 NOTE — PROGRESS NOTES
[facility]        NAME: Wendie Meehan is a 32 y.o. male  : 1995    MRN: [id number]  DATE: 2023  TIME: 10:11 AM    Assessment and Plan   Strain of muscle, fascia and tendon of lower back, initial encounter [S39.012A]  1. Strain of muscle, fascia and tendon of lower back, initial encounter  cyclobenzaprine (FLEXERIL) 10 mg tablet      2. Strain of thoracic back region        3. History of IBS        4. Nausea  ondansetron (ZOFRAN) 4 mg tablet        Bilateral low back pain and right upper mid thoracic back pain x3 to 4 days after lifting some heavy boxes in his garage. History of back issues in the past.  We will try Flexeril and encourage warm compresses and NSAIDs. PT also having diffuse abdominal cramping/achy. Some diarrhea 3 days ago, now feels some trouble with constipation. History of IBS. Given recommendations for at home remedies. No significant tenderness on exam.  Advised family doctor follow-up. Advised to the ER symptoms worsen. Sending in Zofran for nausea. Patient Instructions     Take prescribed medication as instructed. Do not take prior to working or driving/operating machinery. Tylenol/ibuprofen. Continue with prescribed famotidine as instructed by your family doctor. Recommended warm compresses to the low back as discussed. Do not fall asleep with heating pad on her low back. Avoid spicy, greasy foods, caffeine, chocolate, soda/carbonated beverages, mint, and avoid laying down after eating for at least 30 to 45 minutes. Make sure to stay well-hydrated. Increase fiber intake. May try over-the-counter stool softener if constipation continues. Follow-up with your family doctor/GI specialist for chronic IBS. If you develop any severe or worsening abdominal pain-go to the emergency room immediately. Follow up with PCP in 3-5 days. Proceed to  ER if symptoms worsen.     Chief Complaint     Chief Complaint   Patient presents with    Pain     Patient reported 3 days ago he developed abdominal pain. Patient reported pain is umbilical area that also radiates up to epigastric. Patient reported that his pain is achy. Pain has not changed since it started. He reported the back pain started yesterday. Back pain is generalized lower back bilaterally. Patient reported there is one spot mid thoracic lumbar that bothers him as well. Patient reported he has has diarrhea 2 days as well. Patient reported nausea as well. Patient has not taken anything. History of Present Illness       25-year-old male presents to the clinic for diffuse low back pain and right upper mid thoracic back pain that began 3 days ago. PT states he was lifting some heavy boxes to clean out his garage. He denies a specific lifting motion that caused sudden pain. PT states he felt the back soreness the next day. Describes the back pain as tight and achy. PT also complains of diffuse abdominal pain is described as achy/cramping. History of IBS. Had 2 episodes of diarrhea 3 days ago, now feeling some straining with having bowel movements. PT states bowel movements have been normal without any blood. History of hiatal hernia-states has been burping recently. Currently taking famotidine by his PCP states it helps his symptoms. Denies any fever, chills, chest pain, shortness of breath, vomiting, numbness, tingling, weakness. He denies any urinary frequency, urgency, burning with urination, flank pain. PT states he has history of tiny nonobstructing kidney stones,        Review of Systems   Review of Systems   Constitutional: Negative. HENT: Negative. Eyes: Negative. Respiratory: Negative. Cardiovascular: Negative. Gastrointestinal:  Positive for abdominal pain, constipation, diarrhea and nausea. Negative for abdominal distention, blood in stool, rectal pain and vomiting. Genitourinary: Negative. Musculoskeletal:  Positive for back pain. Skin: Negative. Neurological: Negative. Psychiatric/Behavioral: Negative. Current Medications       Current Outpatient Medications:     busPIRone (BUSPAR) 5 mg tablet, , Disp: , Rfl:     cyclobenzaprine (FLEXERIL) 10 mg tablet, Take 1 tablet (10 mg total) by mouth 2 (two) times a day as needed for muscle spasms, Disp: 21 tablet, Rfl: 0    fluticasone (FLONASE) 50 mcg/act nasal spray, 1 spray into each nostril daily, Disp: 9.9 mL, Rfl: 0    ondansetron (ZOFRAN) 4 mg tablet, Take 1 tablet (4 mg total) by mouth every 8 (eight) hours as needed for nausea or vomiting, Disp: 20 tablet, Rfl: 0    Current Allergies     Allergies as of 10/16/2023 - Reviewed 10/16/2023   Allergen Reaction Noted    Amoxicillin Hives 02/19/2020            The following portions of the patient's history were reviewed and updated as appropriate: allergies, current medications, past family history, past medical history, past social history, past surgical history and problem list.     Past Medical History:   Diagnosis Date    Anxiety        Past Surgical History:   Procedure Laterality Date    NO PAST SURGERIES         No family history on file. Medications have been verified. Objective   BP 98/66   Pulse 97   Temp 97.9 °F (36.6 °C) (Tympanic)   Resp 18   Wt 58.5 kg (129 lb)   SpO2 97%   BMI 20.82 kg/m²        Physical Exam     Physical Exam  Constitutional:       General: He is not in acute distress. Appearance: Normal appearance. He is not ill-appearing, toxic-appearing or diaphoretic. HENT:      Head: Normocephalic and atraumatic. Mouth/Throat:      Mouth: Mucous membranes are moist.      Pharynx: Oropharynx is clear. No oropharyngeal exudate or posterior oropharyngeal erythema. Eyes:      Extraocular Movements: Extraocular movements intact. Conjunctiva/sclera: Conjunctivae normal.      Pupils: Pupils are equal, round, and reactive to light.    Cardiovascular:      Rate and Rhythm: Normal rate and regular rhythm. Pulses: Normal pulses. Heart sounds: Normal heart sounds. Pulmonary:      Effort: Pulmonary effort is normal.      Breath sounds: Normal breath sounds. Abdominal:      General: Abdomen is flat. Bowel sounds are increased. There is no distension. There are no signs of injury. Palpations: Abdomen is soft. There is no fluid wave or mass. Tenderness: There is no abdominal tenderness. There is no right CVA tenderness, left CVA tenderness, guarding or rebound. Negative signs include Del Valle's sign, Rovsing's sign, McBurney's sign, psoas sign and obturator sign. Musculoskeletal:      Cervical back: Normal range of motion and neck supple. No tenderness. Skin:     General: Skin is warm and dry. Capillary Refill: Capillary refill takes less than 2 seconds. Findings: No rash. Neurological:      General: No focal deficit present. Mental Status: He is alert and oriented to person, place, and time.    Psychiatric:         Mood and Affect: Mood normal.         Behavior: Behavior normal.

## 2023-10-19 ENCOUNTER — APPOINTMENT (OUTPATIENT)
Dept: LAB | Facility: HOSPITAL | Age: 28
End: 2023-10-19
Payer: MEDICARE

## 2023-10-19 ENCOUNTER — OFFICE VISIT (OUTPATIENT)
Dept: GASTROENTEROLOGY | Facility: CLINIC | Age: 28
End: 2023-10-19

## 2023-10-19 VITALS
HEART RATE: 64 BPM | HEIGHT: 66 IN | SYSTOLIC BLOOD PRESSURE: 118 MMHG | DIASTOLIC BLOOD PRESSURE: 80 MMHG | WEIGHT: 128.6 LBS | BODY MASS INDEX: 20.67 KG/M2 | OXYGEN SATURATION: 99 %

## 2023-10-19 DIAGNOSIS — R10.31 RIGHT LOWER QUADRANT ABDOMINAL PAIN: ICD-10-CM

## 2023-10-19 DIAGNOSIS — R10.31 RIGHT LOWER QUADRANT ABDOMINAL PAIN: Primary | ICD-10-CM

## 2023-10-19 DIAGNOSIS — R19.7 DIARRHEA OF PRESUMED INFECTIOUS ORIGIN: ICD-10-CM

## 2023-10-19 DIAGNOSIS — R14.0 BLOATING: ICD-10-CM

## 2023-10-19 LAB
ALBUMIN SERPL BCP-MCNC: 4.6 G/DL (ref 3.5–5)
ALP SERPL-CCNC: 83 U/L (ref 34–104)
ALT SERPL W P-5'-P-CCNC: 19 U/L (ref 7–52)
ANION GAP SERPL CALCULATED.3IONS-SCNC: 4 MMOL/L
AST SERPL W P-5'-P-CCNC: 14 U/L (ref 13–39)
BASOPHILS # BLD AUTO: 0.04 THOUSANDS/ÂΜL (ref 0–0.1)
BASOPHILS NFR BLD AUTO: 0 % (ref 0–1)
BILIRUB SERPL-MCNC: 0.6 MG/DL (ref 0.2–1)
BUN SERPL-MCNC: 8 MG/DL (ref 5–25)
CALCIUM SERPL-MCNC: 9.7 MG/DL (ref 8.4–10.2)
CHLORIDE SERPL-SCNC: 105 MMOL/L (ref 96–108)
CO2 SERPL-SCNC: 31 MMOL/L (ref 21–32)
CREAT SERPL-MCNC: 0.79 MG/DL (ref 0.6–1.3)
EOSINOPHIL # BLD AUTO: 0.28 THOUSAND/ÂΜL (ref 0–0.61)
EOSINOPHIL NFR BLD AUTO: 3 % (ref 0–6)
ERYTHROCYTE [DISTWIDTH] IN BLOOD BY AUTOMATED COUNT: 13.1 % (ref 11.6–15.1)
GFR SERPL CREATININE-BSD FRML MDRD: 123 ML/MIN/1.73SQ M
GLUCOSE SERPL-MCNC: 89 MG/DL (ref 65–140)
HCT VFR BLD AUTO: 44.7 % (ref 36.5–49.3)
HGB BLD-MCNC: 14.9 G/DL (ref 12–17)
IMM GRANULOCYTES # BLD AUTO: 0.03 THOUSAND/UL (ref 0–0.2)
IMM GRANULOCYTES NFR BLD AUTO: 0 % (ref 0–2)
LYMPHOCYTES # BLD AUTO: 4.12 THOUSANDS/ÂΜL (ref 0.6–4.47)
LYMPHOCYTES NFR BLD AUTO: 42 % (ref 14–44)
MCH RBC QN AUTO: 29.3 PG (ref 26.8–34.3)
MCHC RBC AUTO-ENTMCNC: 33.3 G/DL (ref 31.4–37.4)
MCV RBC AUTO: 88 FL (ref 82–98)
MONOCYTES # BLD AUTO: 0.69 THOUSAND/ÂΜL (ref 0.17–1.22)
MONOCYTES NFR BLD AUTO: 7 % (ref 4–12)
NEUTROPHILS # BLD AUTO: 4.75 THOUSANDS/ÂΜL (ref 1.85–7.62)
NEUTS SEG NFR BLD AUTO: 48 % (ref 43–75)
NRBC BLD AUTO-RTO: 0 /100 WBCS
PLATELET # BLD AUTO: 329 THOUSANDS/UL (ref 149–390)
PMV BLD AUTO: 10.2 FL (ref 8.9–12.7)
POTASSIUM SERPL-SCNC: 3.3 MMOL/L (ref 3.5–5.3)
PROT SERPL-MCNC: 7.1 G/DL (ref 6.4–8.4)
RBC # BLD AUTO: 5.09 MILLION/UL (ref 3.88–5.62)
SODIUM SERPL-SCNC: 140 MMOL/L (ref 135–147)
WBC # BLD AUTO: 9.91 THOUSAND/UL (ref 4.31–10.16)

## 2023-10-19 PROCEDURE — 80053 COMPREHEN METABOLIC PANEL: CPT

## 2023-10-19 PROCEDURE — 86364 TISS TRNSGLTMNASE EA IG CLAS: CPT

## 2023-10-19 PROCEDURE — 82784 ASSAY IGA/IGD/IGG/IGM EACH: CPT

## 2023-10-19 PROCEDURE — 86231 EMA EACH IG CLASS: CPT

## 2023-10-19 PROCEDURE — 85025 COMPLETE CBC W/AUTO DIFF WBC: CPT

## 2023-10-19 PROCEDURE — 36415 COLL VENOUS BLD VENIPUNCTURE: CPT

## 2023-10-19 PROCEDURE — 86258 DGP ANTIBODY EACH IG CLASS: CPT

## 2023-10-19 NOTE — PROGRESS NOTES
[facility] - Outpatient Follow-up Note  Gold Free 32 y.o. male MRN: [id number]  Encounter: [id number]          ASSESSMENT AND PLAN:      1. Right lower quadrant abdominal pain  2. Bloating  3. Change in bowel habits  Acute onset of symptoms last Friday after drinking coffee (which he does not normally drink)  He notes bloat, pain and abnormal bowel habits    Start fiber and probiotic  Check labs  Check US    He was here in 5/2022 with similar symptoms which were resolving and so no workup was done    He notes occasional panic attacks which will cause diarrhea    He admits he is worried about the symptoms possibly being something serious and is requesting a CT scan  CT 7/2023, 7/2022, 3/2022, 11/2021, 11/2021 - advised avoiding more radiation exposure unless necessary  Agreed US will be just as beneficial   ______________________________________________________________________    SUBJECTIVE:  32year old male with a history of anxiety and panic attacks who presents for evaluation of abdominal pain, bloating and change in bowel habits. He reports that he was in his usual state of good health until last Friday. He was doing heavy lifting as he was moving things. Shortly after this he drank a large coffee with cream, whipped cream and sugar. Immediately after drinking the coffee he had the urge to have diarrhea. He reports that since that time he has had irregular bowel movements. He has not had consistent diarrhea but reports he is having frequent bowel movements which are small. He does not necessarily feel constipated. He reports abdominal pain and discomfort. He reports bloating and gaseousness. He denies any rectal bleeding. Prior to symptom onset he denies any new medications or antibiotics. He does report that when he has panic attacks he gets diarrhea. He denies having a panic attack on Friday but admits that he has had some increased life stressors recently.   The patient was seen in May 2022 with similar symptoms. By the time of the appointment he was already feeling better and so no further work-up was performed at that time. He has never had a colonoscopy. He denies any family history of any serious gastrointestinal disorders that he is aware of. He reports that between May 2022 and now except for the occasional panic attack his bowel movements have been normal.  The patient has had multiple CT scans of his abdomen and pelvis over the past 2 years due to various issues all of which have been unremarkable for any serious pathology. He denies any rectal bleeding, unexpected weight loss, vomiting. He reports that he is eating fine. REVIEW OF SYSTEMS IS OTHERWISE NEGATIVE. Historical Information   Past Medical History:   Diagnosis Date    Anxiety      Past Surgical History:   Procedure Laterality Date    NO PAST SURGERIES       Social History   Social History     Substance and Sexual Activity   Alcohol Use Yes    Comment: occ     Social History     Substance and Sexual Activity   Drug Use Never     Social History     Tobacco Use   Smoking Status Every Day    Packs/day: 1.00    Types: Cigarettes   Smokeless Tobacco Never   Tobacco Comments    quit a week ago. History reviewed. No pertinent family history. Meds/Allergies       Current Outpatient Medications:     busPIRone (BUSPAR) 5 mg tablet    cyclobenzaprine (FLEXERIL) 10 mg tablet    Allergies   Allergen Reactions    Amoxicillin Hives           Objective     Blood pressure 118/80, pulse 64, height 5' 6" (1.676 m), weight 58.3 kg (128 lb 9.6 oz), SpO2 99 %. Body mass index is 20.76 kg/m². PHYSICAL EXAM:      General Appearance:   Alert, cooperative, no distress   HEENT:   Normocephalic, atraumatic, anicteric.      Neck:  Supple, symmetrical, trachea midline   Lungs:   Clear to auscultation bilaterally; no rales, rhonchi or wheezing; respirations unlabored    Heart[de-identified]   Regular rate and rhythm; no murmur, rub, or gallop. Abdomen:   Soft, tender to palpation RLQ, non-distended; normal bowel sounds; no masses, no organomegaly    Genitalia:   Deferred    Rectal:   Deferred    Extremities:  No cyanosis, clubbing or edema    Pulses:  2+ and symmetric    Skin:  No jaundice, rashes, or lesions    Lymph nodes:  No palpable cervical lymphadenopathy        Lab Results:   No visits with results within 1 Day(s) from this visit.    Latest known visit with results is:   Admission on 07/03/2023, Discharged on 07/03/2023   Component Date Value    WBC 07/03/2023 9.09     RBC 07/03/2023 5.43     Hemoglobin 07/03/2023 16.2     Hematocrit 07/03/2023 47.4     MCV 07/03/2023 87     MCH 07/03/2023 29.8     MCHC 07/03/2023 34.2     RDW 07/03/2023 13.2     MPV 07/03/2023 10.0     Platelets 34/55/8787 307     nRBC 07/03/2023 0     Neutrophils Relative 07/03/2023 53     Immat GRANS % 07/03/2023 0     Lymphocytes Relative 07/03/2023 37     Monocytes Relative 07/03/2023 6     Eosinophils Relative 07/03/2023 3     Basophils Relative 07/03/2023 1     Neutrophils Absolute 07/03/2023 4.77     Immature Grans Absolute 07/03/2023 0.02     Lymphocytes Absolute 07/03/2023 3.38     Monocytes Absolute 07/03/2023 0.58     Eosinophils Absolute 07/03/2023 0.28     Basophils Absolute 07/03/2023 0.06     Sodium 07/03/2023 139     Potassium 07/03/2023 3.9     Chloride 07/03/2023 106     CO2 07/03/2023 26     ANION GAP 07/03/2023 7     BUN 07/03/2023 7     Creatinine 07/03/2023 0.77     Glucose 07/03/2023 102     Calcium 07/03/2023 10.1     AST 07/03/2023 19     ALT 07/03/2023 21     Alkaline Phosphatase 07/03/2023 89     Total Protein 07/03/2023 7.4     Albumin 07/03/2023 4.9     Total Bilirubin 07/03/2023 0.56     eGFR 07/03/2023 124     Lipase 07/03/2023 23     Color, UA 07/03/2023 Yellow     Clarity, UA 07/03/2023 Clear     Specific Gravity, UA 07/03/2023 1.019     pH, UA 07/03/2023 6.0     Leukocytes, UA 07/03/2023 Negative     Nitrite, UA 07/03/2023 Negative     Protein, UA 07/03/2023 Negative     Glucose, UA 07/03/2023 Negative     Ketones, UA 07/03/2023 Negative     Urobilinogen, UA 07/03/2023 <2.0     Bilirubin, UA 07/03/2023 Negative     Occult Blood, UA 07/03/2023 Negative          Radiology Results:   No results found. Answers submitted by the patient for this visit:  Abdominal Pain Questionnaire (Submitted on 10/19/2023)  Chief Complaint: Abdominal pain  Onset: in the past 7 days  Onset quality: undetermined  Frequency: 2 to 4 times per day  Episode duration: 2 Hours  Progression since onset: waxing and waning  Pain location: RLQ  Pain - numeric: 3/10  Pain quality: aching, burning, a sensation of fullness  Radiates to: periumbilical region, suprapubic region, back  anorexia: Yes  arthralgias: No  belching: Yes  constipation: Yes  diarrhea: Yes  dysuria: No  fever: No  flatus: Yes  frequency: No  headaches: No  hematochezia: No  hematuria: No  melena: No  myalgias: Yes  nausea:  No  weight loss: No  vomiting: No  Aggravated by: nothing  Relieved by: bowel movements, passing flatus

## 2023-10-21 ENCOUNTER — HOSPITAL ENCOUNTER (EMERGENCY)
Facility: HOSPITAL | Age: 28
Discharge: HOME | End: 2023-10-21
Attending: EMERGENCY MEDICINE
Payer: MEDICARE

## 2023-10-21 VITALS
DIASTOLIC BLOOD PRESSURE: 61 MMHG | OXYGEN SATURATION: 100 % | RESPIRATION RATE: 18 BRPM | SYSTOLIC BLOOD PRESSURE: 118 MMHG | TEMPERATURE: 97.8 F | HEART RATE: 71 BPM

## 2023-10-21 DIAGNOSIS — R10.84 GENERALIZED ABDOMINAL PAIN: Primary | ICD-10-CM

## 2023-10-21 LAB
ALBUMIN SERPL BCP-MCNC: 4.7 G/DL (ref 3.5–5)
ALP SERPL-CCNC: 84 U/L (ref 34–104)
ALT SERPL W P-5'-P-CCNC: 21 U/L (ref 7–52)
ANION GAP SERPL CALCULATED.3IONS-SCNC: 4 MMOL/L
AST SERPL W P-5'-P-CCNC: 17 U/L (ref 13–39)
BASOPHILS # BLD AUTO: 0.07 THOUSANDS/ÂΜL (ref 0–0.1)
BASOPHILS NFR BLD AUTO: 1 % (ref 0–1)
BILIRUB SERPL-MCNC: 0.33 MG/DL (ref 0.2–1)
BILIRUB UR QL STRIP: NEGATIVE
BUN SERPL-MCNC: 8 MG/DL (ref 5–25)
CALCIUM SERPL-MCNC: 9.9 MG/DL (ref 8.4–10.2)
CHLORIDE SERPL-SCNC: 105 MMOL/L (ref 96–108)
CLARITY UR: CLEAR
CO2 SERPL-SCNC: 31 MMOL/L (ref 21–32)
COLOR UR: COLORLESS
CREAT SERPL-MCNC: 0.73 MG/DL (ref 0.6–1.3)
ENDOMYSIUM IGA SER QL: NEGATIVE
EOSINOPHIL # BLD AUTO: 0.29 THOUSAND/ÂΜL (ref 0–0.61)
EOSINOPHIL NFR BLD AUTO: 3 % (ref 0–6)
ERYTHROCYTE [DISTWIDTH] IN BLOOD BY AUTOMATED COUNT: 13 % (ref 11.6–15.1)
GFR SERPL CREATININE-BSD FRML MDRD: 127 ML/MIN/1.73SQ M
GLIADIN PEPTIDE IGA SER-ACNC: 4 UNITS (ref 0–19)
GLIADIN PEPTIDE IGG SER-ACNC: 2 UNITS (ref 0–19)
GLUCOSE SERPL-MCNC: 99 MG/DL (ref 65–140)
GLUCOSE UR STRIP-MCNC: NEGATIVE MG/DL
HCT VFR BLD AUTO: 45.5 % (ref 36.5–49.3)
HGB BLD-MCNC: 15.2 G/DL (ref 12–17)
HGB UR QL STRIP.AUTO: NEGATIVE
IGA SERPL-MCNC: 175 MG/DL (ref 90–386)
IMM GRANULOCYTES # BLD AUTO: 0.02 THOUSAND/UL (ref 0–0.2)
IMM GRANULOCYTES NFR BLD AUTO: 0 % (ref 0–2)
KETONES UR STRIP-MCNC: NEGATIVE MG/DL
LEUKOCYTE ESTERASE UR QL STRIP: NEGATIVE
LIPASE SERPL-CCNC: 25 U/L (ref 11–82)
LYMPHOCYTES # BLD AUTO: 4.74 THOUSANDS/ÂΜL (ref 0.6–4.47)
LYMPHOCYTES NFR BLD AUTO: 46 % (ref 14–44)
MAGNESIUM SERPL-MCNC: 2.4 MG/DL (ref 1.9–2.7)
MCH RBC QN AUTO: 29.1 PG (ref 26.8–34.3)
MCHC RBC AUTO-ENTMCNC: 33.4 G/DL (ref 31.4–37.4)
MCV RBC AUTO: 87 FL (ref 82–98)
MONOCYTES # BLD AUTO: 0.6 THOUSAND/ÂΜL (ref 0.17–1.22)
MONOCYTES NFR BLD AUTO: 6 % (ref 4–12)
NEUTROPHILS # BLD AUTO: 4.41 THOUSANDS/ÂΜL (ref 1.85–7.62)
NEUTS SEG NFR BLD AUTO: 44 % (ref 43–75)
NITRITE UR QL STRIP: NEGATIVE
NRBC BLD AUTO-RTO: 0 /100 WBCS
PH UR STRIP.AUTO: 7 [PH]
PLATELET # BLD AUTO: 332 THOUSANDS/UL (ref 149–390)
PMV BLD AUTO: 10 FL (ref 8.9–12.7)
POTASSIUM SERPL-SCNC: 3.6 MMOL/L (ref 3.5–5.3)
PROT SERPL-MCNC: 7.1 G/DL (ref 6.4–8.4)
PROT UR STRIP-MCNC: NEGATIVE MG/DL
RBC # BLD AUTO: 5.23 MILLION/UL (ref 3.88–5.62)
SODIUM SERPL-SCNC: 140 MMOL/L (ref 135–147)
SP GR UR STRIP.AUTO: 1 (ref 1–1.03)
TTG IGA SER-ACNC: <2 U/ML (ref 0–3)
TTG IGG SER-ACNC: <2 U/ML (ref 0–5)
UROBILINOGEN UR STRIP-ACNC: <2 MG/DL
WBC # BLD AUTO: 10.13 THOUSAND/UL (ref 4.31–10.16)

## 2023-10-21 PROCEDURE — 36415 COLL VENOUS BLD VENIPUNCTURE: CPT | Performed by: EMERGENCY MEDICINE

## 2023-10-21 PROCEDURE — 81003 URINALYSIS AUTO W/O SCOPE: CPT | Performed by: EMERGENCY MEDICINE

## 2023-10-21 PROCEDURE — 83690 ASSAY OF LIPASE: CPT | Performed by: EMERGENCY MEDICINE

## 2023-10-21 PROCEDURE — 99283 EMERGENCY DEPT VISIT LOW MDM: CPT

## 2023-10-21 PROCEDURE — 80053 COMPREHEN METABOLIC PANEL: CPT | Performed by: EMERGENCY MEDICINE

## 2023-10-21 PROCEDURE — 99284 EMERGENCY DEPT VISIT MOD MDM: CPT | Performed by: EMERGENCY MEDICINE

## 2023-10-21 PROCEDURE — 96360 HYDRATION IV INFUSION INIT: CPT

## 2023-10-21 PROCEDURE — 85025 COMPLETE CBC W/AUTO DIFF WBC: CPT | Performed by: EMERGENCY MEDICINE

## 2023-10-21 PROCEDURE — 83735 ASSAY OF MAGNESIUM: CPT | Performed by: EMERGENCY MEDICINE

## 2023-10-21 RX ADMIN — SODIUM CHLORIDE 1000 ML: 0.9 INJECTION, SOLUTION INTRAVENOUS at 17:43

## 2023-10-21 NOTE — ED PROVIDER NOTES
History  Chief Complaint   Patient presents with    Abdominal Pain     Pt c/o lower back and abd cramping since Thursday, pt reports being seen at gastro and dx with gas "but I have kidney stones I just want to be sure" pt denies urinary symptoms       History provided by:  Patient  Abdominal Cramping  Pain location:  Generalized  Pain quality: aching and cramping    Pain radiates to:  R flank  Pain severity:  Moderate  Onset quality:  Gradual  Duration:  1 week  Timing:  Intermittent  Progression:  Waxing and waning  Chronicity:  Recurrent  Context: laxative use (Has been using miralax)    Context: not diet changes, not previous surgeries and not recent illness    Relieved by:  Nothing  Worsened by:  Nothing  Ineffective treatments:  OTC medications  Associated symptoms: constipation and diarrhea    Associated symptoms: no anorexia, no belching, no chest pain, no chills, no cough, no dysuria, no fatigue, no fever, no hematemesis, no hematochezia, no hematuria, no melena, no nausea, no shortness of breath, no sore throat and no vomiting    Associated symptoms comment:  Intermittent feeling like he has some constipation and then was takign miralax and then has to urgently go to the bathroom like diarrhea but it is softer pill of stools  Risk factors: no alcohol abuse and has not had multiple surgeries        Prior to Admission Medications   Prescriptions Last Dose Informant Patient Reported? Taking?   busPIRone (BUSPAR) 5 mg tablet  Self Yes No   cyclobenzaprine (FLEXERIL) 10 mg tablet  Self No No   Sig: Take 1 tablet (10 mg total) by mouth 2 (two) times a day as needed for muscle spasms      Facility-Administered Medications: None       Past Medical History:   Diagnosis Date    Anxiety        Past Surgical History:   Procedure Laterality Date    NO PAST SURGERIES         History reviewed. No pertinent family history. I have reviewed and agree with the history as documented.     E-Cigarette/Vaping    E-Cigarette Use Never User      E-Cigarette/Vaping Substances    Nicotine No     THC No     CBD No     Flavoring No     Other No     Unknown No      Social History     Tobacco Use    Smoking status: Every Day     Packs/day: 1.00     Types: Cigarettes    Smokeless tobacco: Never    Tobacco comments:     quit a week ago. Vaping Use    Vaping Use: Never used   Substance Use Topics    Alcohol use: Yes     Comment: occ    Drug use: Never       Review of Systems   Constitutional:  Negative for chills, fatigue and fever. HENT:  Negative for sore throat. Respiratory:  Negative for cough and shortness of breath. Cardiovascular:  Negative for chest pain. Gastrointestinal:  Positive for constipation and diarrhea. Negative for anorexia, hematemesis, hematochezia, melena, nausea and vomiting. Genitourinary:  Negative for dysuria and hematuria. All other systems reviewed and are negative. Physical Exam  Physical Exam  Vitals and nursing note reviewed. Constitutional:       General: He is not in acute distress. Appearance: He is well-developed. He is not diaphoretic. HENT:      Head: Normocephalic and atraumatic. Nose: Nose normal.   Eyes:      Extraocular Movements: Extraocular movements intact. Conjunctiva/sclera: Conjunctivae normal.   Cardiovascular:      Rate and Rhythm: Normal rate and regular rhythm. Heart sounds: Normal heart sounds. Pulmonary:      Effort: Pulmonary effort is normal. No respiratory distress. Breath sounds: Normal breath sounds. Abdominal:      General: There is no distension. Palpations: Abdomen is soft. Tenderness: There is no abdominal tenderness. There is no right CVA tenderness, left CVA tenderness, guarding or rebound. Musculoskeletal:         General: No deformity. Normal range of motion. Cervical back: Neck supple. Skin:     General: Skin is warm and dry. Neurological:      General: No focal deficit present.       Mental Status: He is alert and oriented to person, place, and time. Cranial Nerves: No cranial nerve deficit.    Psychiatric:         Mood and Affect: Mood normal.         Vital Signs  ED Triage Vitals [10/21/23 1720]   Temperature Pulse Respirations Blood Pressure SpO2   97.8 °F (36.6 °C) 71 18 118/61 100 %      Temp Source Heart Rate Source Patient Position - Orthostatic VS BP Location FiO2 (%)   Tympanic Monitor Sitting Left arm --      Pain Score       --           Vitals:    10/21/23 1720   BP: 118/61   Pulse: 71   Patient Position - Orthostatic VS: Sitting         Visual Acuity      ED Medications  Medications   sodium chloride 0.9 % bolus 1,000 mL (0 mL Intravenous Stopped 10/21/23 1859)       Diagnostic Studies  Results Reviewed       Procedure Component Value Units Date/Time    Comprehensive metabolic panel [[id number]] Collected: 10/21/23 1742    Lab Status: Final result Specimen: Blood from Arm, Right Updated: 10/21/23 1830     Sodium 140 mmol/L      Potassium 3.6 mmol/L      Chloride 105 mmol/L      CO2 31 mmol/L      ANION GAP 4 mmol/L      BUN 8 mg/dL      Creatinine 0.73 mg/dL      Glucose 99 mg/dL      Calcium 9.9 mg/dL      AST 17 U/L      ALT 21 U/L      Alkaline Phosphatase 84 U/L      Total Protein 7.1 g/dL      Albumin 4.7 g/dL      Total Bilirubin 0.33 mg/dL      eGFR 127 ml/min/1.73sq m     Narrative:      [facility] guidelines for Chronic Kidney Disease (CKD):     Stage 1 with normal or high GFR (GFR > 90 mL/min/1.73 square meters)    Stage 2 Mild CKD (GFR = 60-89 mL/min/1.73 square meters)    Stage 3A Moderate CKD (GFR = 45-59 mL/min/1.73 square meters)    Stage 3B Moderate CKD (GFR = 30-44 mL/min/1.73 square meters)    Stage 4 Severe CKD (GFR = 15-29 mL/min/1.73 square meters)    Stage 5 End Stage CKD (GFR <15 mL/min/1.73 square meters)  Note: GFR calculation is accurate only with a steady state creatinine    Lipase [[id number]]  (Normal) Collected: 10/21/23 1742    Lab Status: Final result Specimen: Blood from Arm, Right Updated: 10/21/23 1830     Lipase 25 u/L     Magnesium [[id number]]  (Normal) Collected: 10/21/23 1742    Lab Status: Final result Specimen: Blood from Arm, Right Updated: 10/21/23 1830     Magnesium 2.4 mg/dL     UA w Reflex to Microscopic w Reflex to Culture [[id number]] Collected: 10/21/23 1745    Lab Status: Final result Specimen: Urine, Clean Catch Updated: 10/21/23 1822     Color, UA Colorless     Clarity, UA Clear     Specific Gravity, UA 1.004     pH, UA 7.0     Leukocytes, UA Negative     Nitrite, UA Negative     Protein, UA Negative mg/dl      Glucose, UA Negative mg/dl      Ketones, UA Negative mg/dl      Urobilinogen, UA <2.0 mg/dl      Bilirubin, UA Negative     Occult Blood, UA Negative    CBC and differential [[id number]]  (Abnormal) Collected: 10/21/23 1742    Lab Status: Final result Specimen: Blood from Arm, Right Updated: 10/21/23 1811     WBC 10.13 Thousand/uL      RBC 5.23 Million/uL      Hemoglobin 15.2 g/dL      Hematocrit 45.5 %      MCV 87 fL      MCH 29.1 pg      MCHC 33.4 g/dL      RDW 13.0 %      MPV 10.0 fL      Platelets 914 Thousands/uL      nRBC 0 /100 WBCs      Neutrophils Relative 44 %      Immat GRANS % 0 %      Lymphocytes Relative 46 %      Monocytes Relative 6 %      Eosinophils Relative 3 %      Basophils Relative 1 %      Neutrophils Absolute 4.41 Thousands/µL      Immature Grans Absolute 0.02 Thousand/uL      Lymphocytes Absolute 4.74 Thousands/µL      Monocytes Absolute 0.60 Thousand/µL      Eosinophils Absolute 0.29 Thousand/µL      Basophils Absolute 0.07 Thousands/µL                    No orders to display              Procedures  Procedures         ED Course                               SBIRT 22yo+      Flowsheet Row Most Recent Value   Initial Alcohol Screen: US AUDIT-C     1. How often do you have a drink containing alcohol? 0 Filed at: 10/21/2023 1748   2. How many drinks containing alcohol do you have on a typical day you are drinking? 0 Filed at: 10/21/2023 1748   3a. Male UNDER 65: How often do you have five or more drinks on one occasion? 0 Filed at: 10/21/2023 1748   Audit-C Score 0 Filed at: 10/21/2023 2290   JAMES: How many times in the past year have you. .. Used an illegal drug or used a prescription medication for non-medical reasons? Never Filed at: 10/21/2023 1748                      Medical Decision Making  80-year-old male coming in with complaint of intermittent crampy abdominal pain migratory for the past multiple days. Seen by his family doctor and GI. Thought to be related to gas. Patient does have a history of related to be the same. May have history of IBS. GI did write for an abdominal ultrasound to try to avoid radiation since patient has had numerous CT scans. Patient does have history of renal stones but nonobstructing renal stones in his kidney. Patient states his pain comes and goes and changes in nature and then started getting some pain in his right lower back which may be his back pain to but that he does have anxiety and then started googling things and started getting worried about the kidney stones he knows he had so came in for evaluation because the ultrasound could be scheduled for a few weeks. He has no history of fever, nausea vomiting or diarrhea. He has no real abdominal pain at present. He denies any noted hematuria dysuria urgency or frequency. Patient on exam has no CVA tenderness and real abdominal tenderness. We will check labs for white count and creatinine, will check UA for UTI and blood. Discussed with him with minimal urinary symptoms and without hematuria less likely to be kidney stone. Patient agreed if urine negative would continue with outpatient follow-up with PCP and GI and outpatient ultrasound. Patient has had intermittent symptoms and has a nontender abdomen so exam currently not clinically concerning for other surgical pathology like appendicitis.     Amount and/or Complexity of Data Reviewed  External Data Reviewed: notes. Labs: ordered. Disposition  Final diagnoses:   Generalized abdominal pain     Time reflects when diagnosis was documented in both MDM as applicable and the Disposition within this note       Time User Action Codes Description Comment    10/21/2023  6:55 PM Hallie, [address] [R10.84] Generalized abdominal pain           ED Disposition       ED Disposition   Discharge    Condition   Stable    Date/Time   Sat Oct 21, 2023 [id number] discharge to home/self care. Follow-up Information       Follow up With Specialties Details Why Contact Alma Conti MD Family Medicine Go to  If symptoms worsen [address]  [address]  [phone]              Discharge Medication List as of 10/21/2023  6:55 PM        CONTINUE these medications which have NOT CHANGED    Details   busPIRone (BUSPAR) 5 mg tablet Starting Thu 9/29/2022, Historical Med      cyclobenzaprine (FLEXERIL) 10 mg tablet Take 1 tablet (10 mg total) by mouth 2 (two) times a day as needed for muscle spasms, Starting Mon 10/16/2023, Normal             No discharge procedures on file.     PDMP Review       None            ED Provider  Electronically Signed by             Vilma Allen MD  10/24/23 9951

## 2023-11-05 ENCOUNTER — HOSPITAL ENCOUNTER (OUTPATIENT)
Dept: ULTRASOUND IMAGING | Facility: HOSPITAL | Age: 28
Discharge: HOME | End: 2023-11-05
Payer: MEDICARE

## 2023-11-05 DIAGNOSIS — R10.31 RIGHT LOWER QUADRANT ABDOMINAL PAIN: ICD-10-CM

## 2023-11-05 DIAGNOSIS — R19.7 DIARRHEA OF PRESUMED INFECTIOUS ORIGIN: ICD-10-CM

## 2023-11-05 PROCEDURE — 76700 US EXAM ABDOM COMPLETE: CPT

## 2023-11-07 ENCOUNTER — TELEPHONE (OUTPATIENT)
Age: 28
End: 2023-11-07

## 2023-11-07 NOTE — TELEPHONE ENCOUNTER
Patients GI provider:  Dr. Stacey Shipley    Number to return call: [phone]    Reason for call: Patient calling for US abdomen complete results that was preformed on 11/5/23. Review of chart shows that results have not been received as of yet. Informed patient that once results are received and reviewed by provider, patient will be contacted. Patient understood and had no further questions.      Scheduled procedure/appointment date if applicable: N/A

## 2023-11-09 NOTE — TELEPHONE ENCOUNTER
Called and spoke to patient. Gave patient message as per Jimmy Tejeda.  Patient voiced understanding and had no further questions or concerns

## 2023-11-09 NOTE — TELEPHONE ENCOUNTER
Called and spoke to patient. Patient is stating that he is still having flank discomfort. He saw his test results on [facility]. ... he is asking if its due to gall stones/gall bladder or kidney stones ? Also asking if he should have a colonoscopy ? & what next steps are. .. Jarrod Watkins please advise. Thank you !

## 2023-11-09 NOTE — TELEPHONE ENCOUNTER
Ultrasound shows a small gallbladder polyp not gallstones. This is not the source of his symptoms. He will need a repeat ultrasound in 1 year but there is nothing else to do about this right now  The kidney stones that are seen are not causing any blockages. They are tiny and therefore again not the source of his symptoms.   Given his ongoing concern we will plan EGD and colonoscopy for the next step in the evaluation    Brandon-please call patient to schedule thank you

## 2023-11-09 NOTE — TELEPHONE ENCOUNTER
Pt calling stating he received his results through [facility] and he has a few concerns and questions. Please reach out to pt.

## 2023-11-09 NOTE — TELEPHONE ENCOUNTER
Called and spoke to patient  he wants to wait to schedule the egd/colon   he wants to get a second opinion and is waiting to here back from his family doctor  Patient will call us if he wants to move forward with scheduling the procedures

## 2023-12-09 ENCOUNTER — OFFICE VISIT (OUTPATIENT)
Dept: URGENT CARE | Facility: CLINIC | Age: 28
End: 2023-12-09
Payer: MEDICARE

## 2023-12-09 VITALS
TEMPERATURE: 98.9 F | DIASTOLIC BLOOD PRESSURE: 66 MMHG | SYSTOLIC BLOOD PRESSURE: 97 MMHG | RESPIRATION RATE: 16 BRPM | OXYGEN SATURATION: 97 % | HEART RATE: 107 BPM

## 2023-12-09 DIAGNOSIS — R68.89 FLU-LIKE SYMPTOMS: Primary | ICD-10-CM

## 2023-12-09 LAB
SARS-COV-2 AG UPPER RESP QL IA: NEGATIVE
VALID CONTROL: NORMAL

## 2023-12-09 PROCEDURE — 87811 SARS-COV-2 COVID19 W/OPTIC: CPT

## 2023-12-09 PROCEDURE — 99213 OFFICE O/P EST LOW 20 MIN: CPT

## 2023-12-09 PROCEDURE — G0463 HOSPITAL OUTPT CLINIC VISIT: HCPCS

## 2023-12-09 RX ORDER — ESCITALOPRAM OXALATE 10 MG/1
10 TABLET ORAL DAILY
COMMUNITY
Start: 2023-12-06

## 2023-12-09 NOTE — PATIENT INSTRUCTIONS
Your nephew who lives in the same house as you has the Flu(which is a virus) according to what you told me, most likely that is what you have but the recommendation is the same whether you have the Flu or not. Antibiotics are not indicated for viral infections. Sometimes a viral nfection may lead to secondary bacterial infection, in which case antibiotics would be indicated at that time. That is not currently the case. If you take antibiotics while you have a viral infection, the medication will be ineffective and you will also have side effects that can normally be associated with antibiotics such as diarrhea. For viral illnesses, the recommendation is for supportive care which would consists of the following: For decongestion, Over The Counter medications:  2nd Generation antihistamines with a decongestant such as:   Claritin-D (Loratadine with Pseudoephedrine)  Zyrtec-D (Cetirizine with Pseudoephedrine)  Allegra-D (Fexofenadine with Pseudoephedrine)  Decongestant alone: Pseudoephedrine 60mg PO every 4-6 hours for nasal congestion. Max 240mg in 24 hour period  Claritin or Zyrtec plus -Coricidin HBP (Safe for when you have high blood pressure)  Nasal corticosteroid: examples are Flonase or Nasacort. For Cough or sore throat:  Salt water gurgle  Honey  Chloraseptic spray  Throat lozenges  Over the Counter Tylenol or Ibuprofen  Dextromethorphan 30mg PO every 6-8 hours for cough. max 120 mg in 24 hour period  If at any point, symptoms are worse or you feel better then worse again, return for re-evaluation. Follow up with PCP in 3-5 days if symptoms not improving. Proceed to ER if symptoms worsen.

## 2023-12-09 NOTE — PROGRESS NOTES
[facility]        NAME: Rhea Blevins is a 29 y.o. male  : 1995    MRN: [id number]  DATE: 2023  TIME: 3:36 PM    Assessment and Plan   Flu-like symptoms [R68.89]  1. Flu-like symptoms  Poct Covid 19 Rapid Antigen Test        Covid negative  Nephew with Flu    Patient Instructions   Your nephew who lives in the same house as you has the Flu(which is a virus) according to what you told me, most likely that is what you have but the recommendation is the same whether you have the Flu or not. Antibiotics are not indicated for viral infections. Sometimes a viral nfection may lead to secondary bacterial infection, in which case antibiotics would be indicated at that time. That is not currently the case. If you take antibiotics while you have a viral infection, the medication will be ineffective and you will also have side effects that can normally be associated with antibiotics such as diarrhea. For viral illnesses, the recommendation is for supportive care which would consists of the following: For decongestion, Over The Counter medications:  2nd Generation antihistamines with a decongestant such as:   Claritin-D (Loratadine with Pseudoephedrine)  Zyrtec-D (Cetirizine with Pseudoephedrine)  Allegra-D (Fexofenadine with Pseudoephedrine)  Decongestant alone: Pseudoephedrine 60mg PO every 4-6 hours for nasal congestion. Max 240mg in 24 hour period  Claritin or Zyrtec plus -Coricidin HBP (Safe for when you have high blood pressure)  Nasal corticosteroid: examples are Flonase or Nasacort. For Cough or sore throat:  Salt water gurgle  Honey  Chloraseptic spray  Throat lozenges  Over the Counter Tylenol or Ibuprofen  Dextromethorphan 30mg PO every 6-8 hours for cough. max 120 mg in 24 hour period  If at any point, symptoms are worse or you feel better then worse again, return for re-evaluation. Follow up with PCP in 3-5 days if symptoms not improving. Proceed to ER if symptoms worsen.   Chief Complaint     Chief Complaint   Patient presents with   • Cold Like Symptoms     Started yesterday, chills, hot, cold, headache and runny nose         History of Present Illness       Headache, runny nose, bodyaches, chills starting yesterday. Nephew who lives in the same house has the Flu. Has taken Ibuprofen but states it does not really help with the bodyaches. Has been ok eating and drinking to keep hydrated otherwise. Review of Systems   Review of Systems   Constitutional:  Positive for chills, diaphoresis, fatigue and fever. HENT:  Positive for congestion, sinus pressure and sore throat. Respiratory:  Positive for cough. Negative for shortness of breath and wheezing. Cardiovascular:  Negative for chest pain and palpitations. Gastrointestinal:  Negative for abdominal pain, nausea and vomiting. Neurological:  Positive for headaches. Negative for dizziness, weakness and light-headedness. Current Medications       Current Outpatient Medications:   •  escitalopram (LEXAPRO) 10 mg tablet, Take 10 mg by mouth daily, Disp: , Rfl:   •  busPIRone (BUSPAR) 5 mg tablet, , Disp: , Rfl:   •  cyclobenzaprine (FLEXERIL) 10 mg tablet, Take 1 tablet (10 mg total) by mouth 2 (two) times a day as needed for muscle spasms (Patient not taking: Reported on 12/9/2023), Disp: 21 tablet, Rfl: 0    Current Allergies     Allergies as of 12/09/2023 - Reviewed 12/09/2023   Allergen Reaction Noted   • Amoxicillin Hives 02/19/2020            The following portions of the patient's history were reviewed and updated as appropriate: allergies, current medications, past family history, past medical history, past social history, past surgical history and problem list.     Past Medical History:   Diagnosis Date   • Anxiety        Past Surgical History:   Procedure Laterality Date   • NO PAST SURGERIES         History reviewed. No pertinent family history. Medications have been verified.         Objective   BP 97/66 Pulse (!) 107   Temp 98.9 °F (37.2 °C)   Resp 16   SpO2 97%   No LMP for male patient. Physical Exam     Physical Exam  Vitals and nursing note reviewed. Constitutional:       Appearance: Normal appearance. HENT:      Head: Normocephalic and atraumatic. Nose: Congestion present. Mouth/Throat:      Mouth: Mucous membranes are moist.      Pharynx: No oropharyngeal exudate or posterior oropharyngeal erythema. Eyes:      Pupils: Pupils are equal, round, and reactive to light. Cardiovascular:      Rate and Rhythm: Normal rate. Pulses: Normal pulses. Pulmonary:      Effort: Pulmonary effort is normal.      Breath sounds: Normal breath sounds. No wheezing, rhonchi or rales. Musculoskeletal:      Cervical back: Normal range of motion. Skin:     General: Skin is warm and dry. Capillary Refill: Capillary refill takes less than 2 seconds. Neurological:      General: No focal deficit present. Mental Status: He is alert and oriented to person, place, and time. Mental status is at baseline. Sensory: No sensory deficit. Motor: No weakness. Psychiatric:         Mood and Affect: Mood normal.         Behavior: Behavior normal.         Thought Content:  Thought content normal.

## 2023-12-19 ENCOUNTER — OFFICE VISIT (OUTPATIENT)
Dept: URGENT CARE | Facility: CLINIC | Age: 28
End: 2023-12-19
Payer: MEDICARE

## 2023-12-19 VITALS
HEART RATE: 88 BPM | RESPIRATION RATE: 18 BRPM | OXYGEN SATURATION: 100 % | SYSTOLIC BLOOD PRESSURE: 106 MMHG | TEMPERATURE: 96.8 F | DIASTOLIC BLOOD PRESSURE: 62 MMHG

## 2023-12-19 DIAGNOSIS — S01.511A LIP LACERATION, INITIAL ENCOUNTER: Primary | ICD-10-CM

## 2023-12-19 PROCEDURE — G0463 HOSPITAL OUTPT CLINIC VISIT: HCPCS

## 2023-12-19 PROCEDURE — 99213 OFFICE O/P EST LOW 20 MIN: CPT

## 2023-12-19 RX ORDER — CLINDAMYCIN HYDROCHLORIDE 300 MG/1
300 CAPSULE ORAL 3 TIMES DAILY
Qty: 21 CAPSULE | Refills: 0 | Status: SHIPPED | OUTPATIENT
Start: 2023-12-19 | End: 2023-12-26

## 2023-12-19 NOTE — PROGRESS NOTES
[facility]        NAME: Renan Carpenter is a 28 y.o. male  : 1995    MRN: [id number]  DATE: 2023  TIME: 6:12 PM    Assessment and Plan   Lip laceration, initial encounter [S01.511A]  1. Lip laceration, initial encounter  clindamycin (CLEOCIN) 300 MG capsule        Bleeding currently controlled. Patient declined tdap administration. Advised patient sutures would not be warranted as wound is already approximated. Surgifoam provided for comfort. Will start on antibiotics to prevent infection. VSS in clinic, appears in no acute distress. Educated on use of OTC products for symptoms. Advised close follow-up with PCP or to report to the ER if symptoms worsen. Patient verbalizes understanding and agreeable to plan.     Patient Instructions     Take antibiotics as directed for next 7 days. Keep surgifoam in place for next 2 days. May take tylenol/ibuprofen every 4-6 hours as needed for pain. Follow-up with PCP in 3-5 days if no improvement of symptoms. Report to ER if symptoms worsen or develop worsening pain, redness, or swelling around site of injury..    Chief Complaint     Chief Complaint   Patient presents with    Lip Laceration     Pt c/o lip pain from dog dragged pt into door frame today and lip is swollen with cut.          History of Present Illness       28 year old male presents for evaluation of laceration to his lip that occurred 2 hours prior to arrival. He relates he was dragged into a door frame by his dog and bit down on his lip. He reports minimal blood loss, bleeding is currently controlled. He denies head strike or LOC. He is not UTD on tetanus as he does not vaccinate. He reports minimal pain and swelling. He is requesting an antibiotic as he relates he had a similar issue in the past and developed an infection. He has not tried any interventions for symptoms.    Trauma  The incident occurred 1 to 3 hours ago. The incident occurred at home. The injury mechanism was a  direct blow. No protective equipment was used. There is an injury to the Lip. The pain is mild. It is unlikely that a foreign body is present. Pertinent negatives include no abdominal pain, abnormal behavior, chest pain, coughing, difficulty breathing, headaches, hearing loss, inability to bear weight, light-headedness, loss of consciousness, memory loss, nausea, neck pain, numbness, seizures, tingling, visual disturbance, vomiting or weakness. There have been no prior injuries to these areas. His tetanus status is out of date.       Review of Systems   Review of Systems   Constitutional:  Negative for activity change, appetite change, chills, fatigue and fever.   HENT:  Negative for hearing loss.    Eyes:  Negative for visual disturbance.   Respiratory:  Negative for cough, chest tightness and shortness of breath.    Cardiovascular:  Negative for chest pain.   Gastrointestinal:  Negative for abdominal pain, constipation, diarrhea, nausea and vomiting.   Musculoskeletal:  Negative for neck pain.   Skin:  Positive for wound. Negative for color change and pallor.   Allergic/Immunologic: Negative for environmental allergies and food allergies.   Neurological:  Negative for dizziness, tingling, seizures, loss of consciousness, weakness, light-headedness, numbness and headaches.   Psychiatric/Behavioral:  Negative for memory loss.          Current Medications       Current Outpatient Medications:     clindamycin (CLEOCIN) 300 MG capsule, Take 1 capsule (300 mg total) by mouth 3 (three) times a day for 7 days, Disp: 21 capsule, Rfl: 0    escitalopram (LEXAPRO) 10 mg tablet, Take 10 mg by mouth daily, Disp: , Rfl:     busPIRone (BUSPAR) 5 mg tablet, , Disp: , Rfl:     cyclobenzaprine (FLEXERIL) 10 mg tablet, Take 1 tablet (10 mg total) by mouth 2 (two) times a day as needed for muscle spasms (Patient not taking: Reported on 12/9/2023), Disp: 21 tablet, Rfl: 0    Current Allergies     Allergies as of 12/19/2023 - Reviewed  12/19/2023   Allergen Reaction Noted    Amoxicillin Hives 02/19/2020            The following portions of the patient's history were reviewed and updated as appropriate: allergies, current medications, past family history, past medical history, past social history, past surgical history and problem list.     Past Medical History:   Diagnosis Date    Anxiety        Past Surgical History:   Procedure Laterality Date    NO PAST SURGERIES         History reviewed. No pertinent family history.      Medications have been verified.        Objective   /62   Pulse 88   Temp (!) 96.8 °F (36 °C) (Tympanic)   Resp 18   SpO2 100%        Physical Exam     Physical Exam  Vitals and nursing note reviewed.   Constitutional:       General: He is awake.      Appearance: Normal appearance. He is well-developed and normal weight.   HENT:      Head: Laceration present.      Right Ear: Hearing normal.      Left Ear: Hearing normal.      Mouth/Throat:      Lips: Pink.      Mouth: Mucous membranes are moist.   Cardiovascular:      Rate and Rhythm: Normal rate and regular rhythm.      Pulses: Normal pulses.      Heart sounds: Normal heart sounds.   Pulmonary:      Effort: Pulmonary effort is normal.      Breath sounds: Normal breath sounds.   Musculoskeletal:      Cervical back: Normal range of motion and neck supple.   Skin:     General: Skin is warm and dry.   Neurological:      General: No focal deficit present.      Mental Status: He is alert and oriented to person, place, and time.   Psychiatric:         Mood and Affect: Mood normal.         Behavior: Behavior normal. Behavior is cooperative.         Thought Content: Thought content normal.         Judgment: Judgment normal.

## 2023-12-19 NOTE — PATIENT INSTRUCTIONS
Take antibiotics as directed for next 7 days. Keep surgifoam in place for next 2 days. May take tylenol/ibuprofen every 4-6 hours as needed for pain. Follow-up with PCP in 3-5 days if no improvement of symptoms. Report to ER if symptoms worsen or develop worsening pain, redness, or swelling around site of injury..

## 2024-02-27 ENCOUNTER — OFFICE VISIT (OUTPATIENT)
Dept: URGENT CARE | Facility: CLINIC | Age: 29
End: 2024-02-27
Payer: MEDICARE

## 2024-02-27 VITALS
HEART RATE: 68 BPM | TEMPERATURE: 97.8 F | OXYGEN SATURATION: 99 % | RESPIRATION RATE: 18 BRPM | SYSTOLIC BLOOD PRESSURE: 104 MMHG | DIASTOLIC BLOOD PRESSURE: 70 MMHG

## 2024-02-27 DIAGNOSIS — J06.9 UPPER RESPIRATORY TRACT INFECTION, UNSPECIFIED TYPE: Primary | ICD-10-CM

## 2024-02-27 DIAGNOSIS — S29.012A STRAIN OF THORACIC BACK REGION: ICD-10-CM

## 2024-02-27 PROCEDURE — G0463 HOSPITAL OUTPT CLINIC VISIT: HCPCS | Performed by: PHYSICIAN ASSISTANT

## 2024-02-27 PROCEDURE — 99213 OFFICE O/P EST LOW 20 MIN: CPT | Performed by: PHYSICIAN ASSISTANT

## 2024-02-27 RX ORDER — NAPROXEN 500 MG/1
500 TABLET ORAL 2 TIMES DAILY WITH MEALS
Qty: 30 TABLET | Refills: 0 | Status: SHIPPED | OUTPATIENT
Start: 2024-02-27

## 2024-02-27 RX ORDER — CYCLOBENZAPRINE HCL 10 MG
10 TABLET ORAL
Qty: 14 TABLET | Refills: 0 | Status: SHIPPED | OUTPATIENT
Start: 2024-02-27

## 2024-02-27 NOTE — PATIENT INSTRUCTIONS
Continue to monitor symptoms.  If new or worsening symptoms develop, go immediately to Er. Drink plenty of fluids.  Follow up with Family Doctor this week.    Sinusitis   WHAT YOU NEED TO KNOW:   Sinusitis is inflammation or infection of your sinuses. Sinusitis is most often caused by a virus. Acute sinusitis may last up to 12 weeks. Chronic sinusitis lasts longer than 12 weeks. Recurrent sinusitis means you have 4 or more infections in 1 year.        DISCHARGE INSTRUCTIONS:   Return to the emergency department if:   You have trouble breathing or wheezing that is getting worse.    You have a stiff neck, a fever, or a bad headache.     You cannot open your eye.     Your eyeball bulges out or you cannot move your eye.     You are more sleepy than normal, or you notice changes in your ability to think, move, or talk.    You have swelling of your forehead or scalp.    Call your doctor if:   You have vision changes, such as double vision.    Your eye and eyelid are red, swollen, and painful.     Your symptoms do not improve or go away after 10 days.    You have nausea and are vomiting.    Your nose is bleeding.    You have questions or concerns about your condition or care.    Medicines:  Your symptoms may go away on their own. Your healthcare provider may recommend watchful waiting for up to 10 days before starting antibiotics. You may need any of the following:  Acetaminophen  decreases pain and fever. It is available without a doctor's order. Ask how much to take and how often to take it. Follow directions. Read the labels of all other medicines you are using to see if they also contain acetaminophen, or ask your doctor or pharmacist. Acetaminophen can cause liver damage if not taken correctly.    NSAIDs , such as ibuprofen, help decrease swelling, pain, and fever. This medicine is available with or without a doctor's order. NSAIDs can cause stomach bleeding or kidney problems in certain people. If you take blood  thinner medicine, always ask your healthcare provider if NSAIDs are safe for you. Always read the medicine label and follow directions.    Nasal steroid sprays  may help decrease inflammation in your nose and sinuses.    Decongestants  help reduce swelling and drain mucus in the nose and sinuses. They may help you breathe easier.     Antihistamines  help dry mucus in the nose and relieve sneezing.     Antibiotics  help treat or prevent a bacterial infection.    Take your medicine as directed.  Contact your healthcare provider if you think your medicine is not helping or if you have side effects. Tell your provider if you are allergic to any medicine. Keep a list of the medicines, vitamins, and herbs you take. Include the amounts, and when and why you take them. Bring the list or the pill bottles to follow-up visits. Carry your medicine list with you in case of an emergency.    Self-care:   Rinse your sinuses as directed.  Use a sinus rinse device to rinse your nasal passages with a saline (salt water) solution or distilled water. Do not use tap water. This will help thin the mucus in your nose and rinse away pollen and dirt. It will also help reduce swelling so you can breathe normally.    Use a humidifier  to increase air moisture in your home. This may make it easier for you to breathe and help decrease your cough.     Sleep with your head elevated.  Place an extra pillow under your head before you go to sleep to help your sinuses drain.     Drink liquids as directed.  Ask your healthcare provider how much liquid to drink each day and which liquids are best for you. Liquids will thin the mucus in your nose and help it drain. Avoid drinks that contain alcohol or caffeine.     Do not smoke, and avoid secondhand smoke.  Nicotine and other chemicals in cigarettes and cigars can make your symptoms worse. Ask your healthcare provider for information if you currently smoke and need help to quit. E-cigarettes or smokeless  tobacco still contain nicotine. Talk to your healthcare provider before you use these products.    Prevent the spread of germs:   Wash your hands often with soap and water.  Wash your hands after you use the bathroom, change a child's diaper, or sneeze. Wash your hands before you prepare or eat food.         Stay away from people who are sick.  Some germs spread easily and quickly through contact.    Follow up with your doctor as directed:  You may be referred to an ear, nose, and throat specialist. Write down your questions so you remember to ask them during your visits.   © Copyright [employer] 2023 Information is for End User's use only and may not be sold, redistributed or otherwise used for commercial purposes.  The above information is an  only. It is not intended as medical advice for individual conditions or treatments. Talk to your doctor, nurse or pharmacist before following any medical regimen to see if it is safe and effective for you.    Muscle Strain   WHAT YOU NEED TO KNOW:   A muscle strain is a twist, pull, or tear of a muscle or tendon. A tendon is a strong elastic tissue that connects a muscle to a bone. Signs of a strained muscle include bruising and swelling over the area, pain with movement, and loss of strength.  DISCHARGE INSTRUCTIONS:   Return to the emergency department if:   You suddenly cannot feel or move your injured muscle.      Call your doctor if:   Your pain and swelling worsen or do not go away.    You have questions or concerns about your condition or care.    Medicines:  You may need any of the following:  NSAIDs  help decrease swelling and pain or fever. This medicine is available with or without a doctor's order. NSAIDs can cause stomach bleeding or kidney problems in certain people. If you take blood thinner medicine, always ask your healthcare provider if NSAIDs are safe for you. Always read the medicine label and follow directions.    Muscle relaxers  help  decrease pain and muscle spasms.    Take your medicine as directed.  Contact your healthcare provider if you think your medicine is not helping or if you have side effects. Tell your provider if you are allergic to any medicine. Keep a list of the medicines, vitamins, and herbs you take. Include the amounts, and when and why you take them. Bring the list or the pill bottles to follow-up visits. Carry your medicine list with you in case of an emergency.    Help a muscle strain heal:   3 to 7 days after the injury:  Use Rest, Ice, Compression, and Elevation (RICE) to help stop bruising and decrease pain and swelling.    Rest your muscle to allow the injury to heal.  When the pain decreases, begin normal, slow movements. For mild and moderate muscle strains, you should rest your muscles for about 2 days. If you have a severe muscle strain, you should rest for 10 to 14 days. You may need to use crutches to walk if your muscle strain is in your legs or lower body.    Apply ice on the injured area.  Use an ice pack, or put crushed ice in a plastic bag. Cover the bag with a towel before you apply it to your skin. Apply ice for 15 to 20 minutes each hour, or as directed.    Use compression to decrease swelling.  You can wrap an elastic bandage around the area to create compression. The bandage should be tight enough for you to feel support. Do not wrap it too tightly.         Elevate the area above the level of your heart, if possible.  Keep the injured muscle raised above your heart if possible. For example, if you have a strain of your lower leg muscle, lie down and prop your leg up on pillows. This helps decrease pain and swelling.       3 to 21 days after your injury:  Start to slowly and regularly exercise your strained muscle. This will help it heal. If you feel pain, decrease how hard you are exercising.    1 to 6 weeks after your injury:  Stretch the injured muscle. Stretch the muscle for about 30 seconds. Do this 4  times a day. You may stretch the muscle until you feel a slight pull. Stop stretching if you feel pain.    2 weeks to 6 months after your injury:  The goal of this phase is to return to the activity you were doing before the injury without hurting the muscle again.    3 weeks to 6 months after your injury:  Keep stretching and strengthening your muscles to prevent injury. Slowly increase the time and distance that you exercise. You may still have signs and symptoms of muscle strain 6 months after the injury, even if you do things to help it heal. In this case, you may need surgery on the muscle.    Prevent muscle strains:   Always wear proper shoes when you play sports.  Replace your old running shoes with new ones often if you are a runner. Use special shoe inserts or arch supports to correct leg or foot problems. Ask your healthcare provider for more information on shoe supports.    Do warm up and cool down exercises.  Do stretching exercises before you work out or do sports activities. These exercises will help loosen and decrease stress on your muscles. Cool down and stretch after your workout. Do not stop and rest after a workout without cooling down first.         Keep your muscles strong with strength training exercises.  Exercises such as weight lifting and stretching exercises help keep your muscles flexible and strong. A physical therapist or  may help you with these exercises.         Slowly start your exercise or sports training program.  Follow your healthcare provider's advice on when to start exercising. Slowly increase time, distance, and how often you train. Sudden increases in how often you train may cause you to injure your muscle again.    Follow up with your doctor as directed:  Your doctor may suggest that you have a follow-up visit before you go back to your usual activity. Write down your questions so you remember to ask them during your visits.  © Copyright [employer] 2023 Information  is for End User's use only and may not be sold, redistributed or otherwise used for commercial purposes.  The above information is an  only. It is not intended as medical advice for individual conditions or treatments. Talk to your doctor, nurse or pharmacist before following any medical regimen to see if it is safe and effective for you.

## 2024-03-02 ENCOUNTER — AMB VIDEO VISIT (OUTPATIENT)
Dept: OTHER | Facility: HOSPITAL | Age: 29
End: 2024-03-02

## 2024-03-02 VITALS — OXYGEN SATURATION: 99 % | SYSTOLIC BLOOD PRESSURE: 112 MMHG | DIASTOLIC BLOOD PRESSURE: 70 MMHG | HEART RATE: 58 BPM

## 2024-03-02 DIAGNOSIS — M54.6 ACUTE MIDLINE THORACIC BACK PAIN: Primary | ICD-10-CM

## 2024-03-02 PROCEDURE — ECARE PR SL URGENT CARE VIRTUAL VISIT: Performed by: PHYSICIAN ASSISTANT

## 2024-03-02 RX ORDER — METHYLPREDNISOLONE 4 MG/1
TABLET ORAL
Qty: 21 EACH | Refills: 0 | Status: SHIPPED | OUTPATIENT
Start: 2024-03-02

## 2024-03-02 NOTE — PATIENT INSTRUCTIONS
Back Pain   WHAT YOU NEED TO KNOW:   Back pain is common. You may have back pain and muscle spasms. You may feel sore or stiff on one or both sides of your back. The pain may spread to your lower body. Conditions that affect the spine, joints, or muscles can cause back pain. These may include arthritis, spinal stenosis (narrowing of the spinal column), muscle tension, or breakdown of the spinal discs.  DISCHARGE INSTRUCTIONS:   Call your local emergency number (911 in the [place]) if:   You have severe back pain with chest pain.    You cannot control your urine or bowel movements.    Your pain becomes so severe that you cannot walk.    Return to the emergency department if:   You have pain, numbness, or weakness in one or both legs.    You have severe back pain, nausea, and vomiting.    You have severe back pain that spreads to your side or genital area.    Call your doctor if:   You have back pain that does not get better with rest and pain medicine.    You have a fever.    You have pain that worsens when you are on your back or when you rest.    You have pain that worsens when you cough or sneeze.    You lose weight without trying.    You have questions or concerns about your condition or care.    Medicines:  You may need any of the following:  NSAIDs  help decrease swelling and pain or fever. This medicine is available with or without a doctor's order. NSAIDs can cause stomach bleeding or kidney problems in certain people. If you take blood thinner medicine, always ask your healthcare provider if NSAIDs are safe for you. Always read the medicine label and follow directions.    Acetaminophen  decreases pain and fever. It is available without a doctor's order. Ask how much to take and how often to take it. Follow directions. Read the labels of all other medicines you are using to see if they also contain acetaminophen, or ask your doctor or pharmacist. Acetaminophen can cause liver damage if not taken  correctly.    Muscle relaxers  help decrease muscle spasms and back pain.    Prescription pain medicine  may be given. Ask your healthcare provider how to take this medicine safely. Some prescription pain medicines contain acetaminophen. Do not take other medicines that contain acetaminophen without talking to your healthcare provider. Too much acetaminophen may cause liver damage. Prescription pain medicine may cause constipation. Ask your healthcare provider how to prevent or treat constipation.     Take your medicine as directed.  Contact your healthcare provider if you think your medicine is not helping or if you have side effects. Tell your provider if you are allergic to any medicine. Keep a list of the medicines, vitamins, and herbs you take. Include the amounts, and when and why you take them. Bring the list or the pill bottles to follow-up visits. Carry your medicine list with you in case of an emergency.    How to manage your back pain:   Apply ice  on your back for 15 to 20 minutes every hour or as directed. Use an ice pack, or put crushed ice in a plastic bag. Cover it with a towel before you apply it to your skin. Ice helps prevent tissue damage and decreases pain.    Apply heat  on your back for 20 to 30 minutes every 2 hours for as many days as directed. Heat helps decrease pain and muscle spasms.    Stay active  as much as you can without causing more pain. Bed rest could make your back pain worse. Avoid heavy lifting until your pain is gone.         Go to physical therapy  as directed. A physical therapist can teach you exercises to help improve movement and strength, and to decrease pain.    Follow up with your doctor in 2 weeks, or as directed:  You might need to see a specialist. Write down your questions so you remember to ask them during your visits.  © Copyright [employer] 2023 Information is for End User's use only and may not be sold, redistributed or otherwise used for commercial  purposes.  The above information is an  only. It is not intended as medical advice for individual conditions or treatments. Talk to your doctor, nurse or pharmacist before following any medical regimen to see if it is safe and effective for you.

## 2024-03-02 NOTE — PROGRESS NOTES
Required Documentation:  Encounter provider Stephanie Waller PA-C    Provider located at [facility]  VIRTUAL CARE   [address] [address]    Identify all parties in room with patient during virtual visit:  No one else    The patient was identified by name and date of birth. Renan Carpenter was informed that this is a telemedicine visit and that the visit is being conducted through the [employer] platform. He agrees to proceed..  My office door was closed. No one else was in the room.  He acknowledged consent and understanding of privacy and security of the video platform. The patient has agreed to participate and understands they can discontinue the visit at any time.    Verification of patient location:    Patient is located at Home in the following state in which I hold an active license [place]    Patient is aware this is a billable service.     Reason for visit is No chief complaint on file.       Subjective  HPI   Patient states that he was urgent care the other day about back pain.  He was given naproxen and flexeril.  It feels like it was in his spine and now sternum.  He slipped on the stairs and twisted his back.  He didn't feel it until 3 days later.  He states the pain is  worse with twisting and movement.  It does go away when he lays flat.  It is worse with movement to the right.  He hit his shoulder  when he fell not his back. It feels like it is on his spine.   The pain radiates to the front and down to the tailbone.      Past Medical History:   Diagnosis Date    Anxiety        Past Surgical History:   Procedure Laterality Date    NO PAST SURGERIES          Allergies   Allergen Reactions    Amoxicillin Hives       Review of Systems   Constitutional: Negative.    HENT: Negative.     Respiratory: Negative.     Cardiovascular: Negative.    Gastrointestinal: Negative.    Musculoskeletal:  Positive for back pain.   Neurological: Negative.     Psychiatric/Behavioral: Negative.         Video Exam    Vitals:    03/02/24 1155   BP: 112/70   Pulse: 58   SpO2: 99%       Physical Exam  Constitutional:       General: He is not in acute distress.     Appearance: Normal appearance. He is not ill-appearing, toxic-appearing or diaphoretic.   HENT:      Head: Normocephalic and atraumatic.   Pulmonary:      Effort: Pulmonary effort is normal.   Musculoskeletal:      Comments: Rom limited due to pain, pain worse with movement   Skin:     General: Skin is dry.   Neurological:      General: No focal deficit present.      Mental Status: He is alert and oriented to person, place, and time.   Psychiatric:         Mood and Affect: Mood normal.         Behavior: Behavior normal.         Visit Time  Total Visit Duration: 5 minutes    Assessment/Plan:    Diagnoses and all orders for this visit:    Acute midline thoracic back pain  -     methylPREDNISolone 4 MG tablet therapy pack; Use as directed on package  -     Ambulatory referral to Physical Therapy; Future    Start steroids as directed, avoid NSAIDS while on steroids  Flexeril as directed  Referral to PT  Declined x-ray  Follow up with PCP  ER if worsen    Patient Instructions   Back Pain   WHAT YOU NEED TO KNOW:   Back pain is common. You may have back pain and muscle spasms. You may feel sore or stiff on one or both sides of your back. The pain may spread to your lower body. Conditions that affect the spine, joints, or muscles can cause back pain. These may include arthritis, spinal stenosis (narrowing of the spinal column), muscle tension, or breakdown of the spinal discs.  DISCHARGE INSTRUCTIONS:   Call your local emergency number (911 in the [place]) if:   You have severe back pain with chest pain.    You cannot control your urine or bowel movements.    Your pain becomes so severe that you cannot walk.    Return to the emergency department if:   You have pain, numbness, or weakness in one or both legs.    You have severe  back pain, nausea, and vomiting.    You have severe back pain that spreads to your side or genital area.    Call your doctor if:   You have back pain that does not get better with rest and pain medicine.    You have a fever.    You have pain that worsens when you are on your back or when you rest.    You have pain that worsens when you cough or sneeze.    You lose weight without trying.    You have questions or concerns about your condition or care.    Medicines:  You may need any of the following:  NSAIDs  help decrease swelling and pain or fever. This medicine is available with or without a doctor's order. NSAIDs can cause stomach bleeding or kidney problems in certain people. If you take blood thinner medicine, always ask your healthcare provider if NSAIDs are safe for you. Always read the medicine label and follow directions.    Acetaminophen  decreases pain and fever. It is available without a doctor's order. Ask how much to take and how often to take it. Follow directions. Read the labels of all other medicines you are using to see if they also contain acetaminophen, or ask your doctor or pharmacist. Acetaminophen can cause liver damage if not taken correctly.    Muscle relaxers  help decrease muscle spasms and back pain.    Prescription pain medicine  may be given. Ask your healthcare provider how to take this medicine safely. Some prescription pain medicines contain acetaminophen. Do not take other medicines that contain acetaminophen without talking to your healthcare provider. Too much acetaminophen may cause liver damage. Prescription pain medicine may cause constipation. Ask your healthcare provider how to prevent or treat constipation.     Take your medicine as directed.  Contact your healthcare provider if you think your medicine is not helping or if you have side effects. Tell your provider if you are allergic to any medicine. Keep a list of the medicines, vitamins, and herbs you take. Include the  amounts, and when and why you take them. Bring the list or the pill bottles to follow-up visits. Carry your medicine list with you in case of an emergency.    How to manage your back pain:   Apply ice  on your back for 15 to 20 minutes every hour or as directed. Use an ice pack, or put crushed ice in a plastic bag. Cover it with a towel before you apply it to your skin. Ice helps prevent tissue damage and decreases pain.    Apply heat  on your back for 20 to 30 minutes every 2 hours for as many days as directed. Heat helps decrease pain and muscle spasms.    Stay active  as much as you can without causing more pain. Bed rest could make your back pain worse. Avoid heavy lifting until your pain is gone.         Go to physical therapy  as directed. A physical therapist can teach you exercises to help improve movement and strength, and to decrease pain.    Follow up with your doctor in 2 weeks, or as directed:  You might need to see a specialist. Write down your questions so you remember to ask them during your visits.  © Copyright [employer] 2023 Information is for End User's use only and may not be sold, redistributed or otherwise used for commercial purposes.  The above information is an  only. It is not intended as medical advice for individual conditions or treatments. Talk to your doctor, nurse or pharmacist before following any medical regimen to see if it is safe and effective for you.

## 2024-03-02 NOTE — CARE ANYWHERE EVISITS
Visit Summary for Renan Carpenter - Gender: Male - Date of Birth: 1995  Date: [id number] - Duration: 8 minutes  Patient: Renan Carpenter  Provider: Stephanie Waller PA-C    Patient Contact Information  Address  [address] [place]; [place] [address]  [phone]    Visit Topics  Back pain  [Added By: Self - 2024-03-02]    Triage Questions   What is your current physical address in the event of a medical emergency? Answer []  Are you allergic to any medications? Answer []  Are you now or could you be pregnant? Answer []  Do you have any immune system compromise or chronic lung   disease? Answer []  Do you have any vulnerable family members in the home (infant, pregnant, cancer, elderly)? Answer []     Conversation Transcripts  [0A][0A] [Notification] You are connected with Stephanie Waller PA-C, Urgent Care Specialist.[0A][Notification] Renan Carpenter is located in [place].[0A][Notification] Renan Carpenter has shared health history...[0A]    Diagnosis  Pain in thoracic spine    Procedures  Value: [id number] Code: CPT-4 UNLISTED E&M SERVICE    Medications Prescribed    No prescriptions ordered    Electronically signed by: Stephanie Waller PA-C(NPI [id number])

## 2024-04-13 NOTE — ED PROVIDER NOTES
History  Chief Complaint   Patient presents with    Rash     Rash to chest and back for three days  Pt believes soap he used may have caused it  This is a 78-year-old male patient presents here with complaints of rash  He reports the rash has been there since Tuesday  He reports the rash starts is a small spot and then erupts into an annular lesion  He has about 6 annular lesions of various sizes some with central clearing  He was seen by Dermatology in given some type of cream that he does not know the name of  No recent fever chills  Nontoxic-appearing  Reports the lesions are itchy  Prior to Admission Medications   Prescriptions Last Dose Informant Patient Reported? Taking?   ibuprofen (MOTRIN) 600 mg tablet   Yes No   Sig: Take 600 mg by mouth every 6 (six) hours as needed for mild pain      Facility-Administered Medications: None       Past Medical History:   Diagnosis Date    Psychiatric disorder     anxiety       History reviewed  No pertinent surgical history  History reviewed  No pertinent family history  I have reviewed and agree with the history as documented  Social History     Tobacco Use    Smoking status: Former Smoker     Packs/day: 0 50     Types: Cigarettes    Smokeless tobacco: Never Used   Substance Use Topics    Alcohol use: Yes     Comment: occasionally     Drug use: No        Review of Systems   Constitutional: Negative for diaphoresis, fatigue and fever  HENT: Negative for congestion, ear pain, nosebleeds and sore throat  Eyes: Negative for photophobia, pain, discharge and visual disturbance  Respiratory: Negative for cough, choking, chest tightness, shortness of breath and wheezing  Cardiovascular: Negative for chest pain and palpitations  Gastrointestinal: Negative for abdominal distention, abdominal pain, diarrhea and vomiting  Genitourinary: Negative for dysuria, flank pain and frequency     Musculoskeletal: Negative for back pain, gait problem and joint swelling  Skin: Positive for rash  Negative for color change  Neurological: Negative for dizziness, syncope and headaches  Psychiatric/Behavioral: Negative for behavioral problems and confusion  The patient is not nervous/anxious  All other systems reviewed and are negative  Physical Exam  Physical Exam   Constitutional: He is oriented to person, place, and time  He appears well-developed and well-nourished  HENT:   Head: Normocephalic and atraumatic  Eyes: Pupils are equal, round, and reactive to light  Neck: Normal range of motion  Neck supple  Cardiovascular: Normal rate, regular rhythm, normal heart sounds and normal pulses  PMI is not displaced  Pulmonary/Chest: Effort normal and breath sounds normal  No respiratory distress  Abdominal: Soft  He exhibits no distension  There is no guarding  Musculoskeletal: Normal range of motion  Lymphadenopathy:     He has no cervical adenopathy  Neurological: He is alert and oriented to person, place, and time  Skin: Skin is warm and dry  Rash (About 6 satellite lesions of various sizes some with annular appearing with central clearing) noted  He is not diaphoretic  No pallor  Psychiatric: He has a normal mood and affect  Vitals reviewed        Vital Signs  ED Triage Vitals [11/23/19 2022]   Temperature Pulse Respirations Blood Pressure SpO2   97 5 °F (36 4 °C) 76 16 123/58 100 %      Temp Source Heart Rate Source Patient Position - Orthostatic VS BP Location FiO2 (%)   Oral Monitor Sitting Left arm --      Pain Score       No Pain           Vitals:    11/23/19 2022   BP: 123/58   Pulse: 76   Patient Position - Orthostatic VS: Sitting         Visual Acuity      ED Medications  Medications - No data to display    Diagnostic Studies  Results Reviewed     None                 No orders to display              Procedures  Procedures       ED Course                               MDM  Number of Diagnoses or Management Options  Rash and nonspecific skin eruption: new and requires workup  Diagnosis management comments: Differentials include viral exanthem, atypical tinea corporis, idiopathic       Amount and/or Complexity of Data Reviewed  Independent visualization of images, tracings, or specimens: yes    Patient Progress  Patient progress: stable      Disposition  Final diagnoses:   Rash and nonspecific skin eruption     Time reflects when diagnosis was documented in both MDM as applicable and the Disposition within this note     Time User Action Codes Description Comment    11/23/2019  9:41 PM [facility] Bold Add [R21] Rash     11/23/2019  9:41 PM [facility] Bold Remove [R21] Rash     11/23/2019  9:41 PM [facility] Bold Add [R21] Rash and nonspecific skin eruption       ED Disposition     ED Disposition Condition Date/Time Comment    Discharge Stable Sat Nov 23, 2019  9:41 PM Tanja Austin discharge to home/self care  Follow-up Information     Follow up With Specialties Details Why R Sera C[facility] Schedule an appointment as soon as possible for a visit  For Recheck [address] [address]  [phone]            Discharge Medication List as of 11/23/2019  9:44 PM      CONTINUE these medications which have CHANGED    Details   clotrimazole-betamethasone (LOTRISONE) 1-0 05 % cream Apply to affected area 2 times daily for 21 days, Print         CONTINUE these medications which have NOT CHANGED    Details   ibuprofen (MOTRIN) 600 mg tablet Take 600 mg by mouth every 6 (six) hours as needed for mild pain, Historical Med           No discharge procedures on file      ED Provider  Electronically Signed by           SOWMYA Oliver  11/23/19 4569 show

## 2024-06-06 ENCOUNTER — OFFICE VISIT (OUTPATIENT)
Dept: URGENT CARE | Facility: CLINIC | Age: 29
End: 2024-06-06
Payer: MEDICARE

## 2024-06-06 ENCOUNTER — APPOINTMENT (OUTPATIENT)
Dept: RADIOLOGY | Facility: CLINIC | Age: 29
End: 2024-06-06
Payer: MEDICARE

## 2024-06-06 VITALS
WEIGHT: 117 LBS | TEMPERATURE: 97.7 F | OXYGEN SATURATION: 97 % | DIASTOLIC BLOOD PRESSURE: 60 MMHG | SYSTOLIC BLOOD PRESSURE: 106 MMHG | BODY MASS INDEX: 18.88 KG/M2 | HEART RATE: 86 BPM | RESPIRATION RATE: 18 BRPM

## 2024-06-06 DIAGNOSIS — S20.212A CONTUSION OF RIB ON LEFT SIDE, INITIAL ENCOUNTER: Primary | ICD-10-CM

## 2024-06-06 DIAGNOSIS — R07.81 RIB PAIN ON LEFT SIDE: ICD-10-CM

## 2024-06-06 PROCEDURE — 71101 X-RAY EXAM UNILAT RIBS/CHEST: CPT

## 2024-06-06 PROCEDURE — 99214 OFFICE O/P EST MOD 30 MIN: CPT | Performed by: PHYSICIAN ASSISTANT

## 2024-06-06 PROCEDURE — G0463 HOSPITAL OUTPT CLINIC VISIT: HCPCS | Performed by: PHYSICIAN ASSISTANT

## 2024-06-06 NOTE — PROGRESS NOTES
[facility]        NAME: Renan Carpenter is a 28 y.o. male  : 1995    MRN: [id number]  DATE: 2024  TIME: 5:40 PM      Assessment and Plan     Contusion of rib on left side, initial encounter [S20.212A]  1. Contusion of rib on left side, initial encounter  XR ribs left w pa chest min 3 views        Note:   X-rays look negative for acute osseous abnormality/fracture. Awaiting final read from radiologist.  Suspect strain/contusion - patient to continue OTC analgesia, ice/heat, and stretching     Patient Instructions   There are no Patient Instructions on file for this visit.     Follow up with PCP in 3-5 days.  Go to ER if symptoms worsen.    Chief Complaint     Chief Complaint   Patient presents with    Rib Injury     Left sided rib pain since Friday or Saturday when he was wrestling with his brother. Its sore to touch and when he moves its a pulling sensation. Able to take deep breathe with some discomfort         History of Present Illness     Patient presents with left rib pain x 5 days. He was wrestling with his brother. He states it is sore to touch, uncomfortable to take a deep breath, and a pulling sensation when moving. He took ibuprofen OTC without much relief.         Review of Systems     Review of Systems   Constitutional:  Negative for chills, fatigue and fever.   HENT:  Negative for congestion, ear pain, postnasal drip, rhinorrhea, sinus pressure, sinus pain, sneezing and sore throat.    Eyes:  Negative for pain and visual disturbance.   Respiratory:  Negative for cough and shortness of breath.    Cardiovascular:  Positive for chest pain. Negative for palpitations.   Gastrointestinal:  Negative for abdominal pain, diarrhea, nausea and vomiting.   Genitourinary:  Negative for dysuria and hematuria.   Musculoskeletal:  Positive for myalgias. Negative for arthralgias and back pain.   Skin:  Negative for rash.   Neurological:  Negative for dizziness, seizures, syncope, numbness and  headaches.   All other systems reviewed and are negative.        Current Medications       Current Outpatient Medications:     loratadine-pseudoephedrine (CLARITIN-D 24-HOUR)  mg per 24 hr tablet, Take 1 tablet by mouth daily, Disp: 15 tablet, Rfl: 0    busPIRone (BUSPAR) 5 mg tablet, , Disp: , Rfl:     cyclobenzaprine (FLEXERIL) 10 mg tablet, Take 1 tablet (10 mg total) by mouth 2 (two) times a day as needed for muscle spasms (Patient not taking: Reported on 12/9/2023), Disp: 21 tablet, Rfl: 0    cyclobenzaprine (FLEXERIL) 10 mg tablet, Take 1 tablet (10 mg total) by mouth daily at bedtime (Patient not taking: Reported on 6/6/2024), Disp: 14 tablet, Rfl: 0    escitalopram (LEXAPRO) 10 mg tablet, Take 10 mg by mouth daily (Patient not taking: Reported on 6/6/2024), Disp: , Rfl:     methylPREDNISolone 4 MG tablet therapy pack, Use as directed on package (Patient not taking: Reported on 6/6/2024), Disp: 21 each, Rfl: 0    naproxen (EC NAPROSYN) 500 MG EC tablet, Take 1 tablet (500 mg total) by mouth 2 (two) times a day with meals (Patient not taking: Reported on 6/6/2024), Disp: 30 tablet, Rfl: 0    Current Allergies     Allergies as of 06/06/2024 - Reviewed 06/06/2024   Allergen Reaction Noted    Amoxicillin Hives 02/19/2020              The following portions of the patient's history were reviewed and updated as appropriate: allergies, current medications, past family history, past medical history, past social history, past surgical history, and problem list.     Past Medical History:   Diagnosis Date    Anxiety        Past Surgical History:   Procedure Laterality Date    NO PAST SURGERIES         History reviewed. No pertinent family history.      Medications have been verified.        Objective     /60   Pulse 86   Temp 97.7 °F (36.5 °C)   Resp 18   Wt 53.1 kg (117 lb)   SpO2 97%   BMI 18.88 kg/m²   No LMP for male patient.         Physical Exam     Physical Exam  Vitals and nursing note  reviewed.   Constitutional:       Appearance: Normal appearance. He is normal weight.   HENT:      Head: Normocephalic and atraumatic.   Cardiovascular:      Rate and Rhythm: Normal rate and regular rhythm.      Heart sounds: Normal heart sounds.   Pulmonary:      Effort: Pulmonary effort is normal.      Breath sounds: Normal breath sounds.      Comments: Mild tenderness to palpation over ribs 8-10 on left side mid-axillary line, but no point tenderness. No skin changes or swelling noted   Chest:      Chest wall: Tenderness present.   Skin:     General: Skin is warm and dry.   Neurological:      General: No focal deficit present.      Mental Status: He is alert and oriented to person, place, and time.   Psychiatric:         Mood and Affect: Mood normal.         Behavior: Behavior normal.

## 2024-07-08 ENCOUNTER — OFFICE VISIT (OUTPATIENT)
Dept: URGENT CARE | Facility: CLINIC | Age: 29
End: 2024-07-08
Payer: MEDICARE

## 2024-07-08 VITALS
SYSTOLIC BLOOD PRESSURE: 102 MMHG | RESPIRATION RATE: 18 BRPM | TEMPERATURE: 97.3 F | HEART RATE: 56 BPM | DIASTOLIC BLOOD PRESSURE: 58 MMHG | OXYGEN SATURATION: 98 %

## 2024-07-08 DIAGNOSIS — H92.01 OTALGIA, RIGHT EAR: ICD-10-CM

## 2024-07-08 DIAGNOSIS — J30.2 SEASONAL ALLERGIC RHINITIS, UNSPECIFIED TRIGGER: Primary | ICD-10-CM

## 2024-07-08 PROCEDURE — 99214 OFFICE O/P EST MOD 30 MIN: CPT | Performed by: PHYSICIAN ASSISTANT

## 2024-07-08 PROCEDURE — G0463 HOSPITAL OUTPT CLINIC VISIT: HCPCS | Performed by: PHYSICIAN ASSISTANT

## 2024-07-08 NOTE — PROGRESS NOTES
[facility]        NAME: Renan Carpenter is a 28 y.o. male  : 1995    MRN: [id number]  DATE: 2024  TIME: 3:17 PM      Assessment and Plan     Seasonal allergic rhinitis, unspecified trigger [J30.2]  1. Seasonal allergic rhinitis, unspecified trigger        2. Otalgia, right ear          Note:   Patient to take OTC decongestants/antihistamines for symptoms     Patient Instructions   There are no Patient Instructions on file for this visit.     Follow up with primary care provider.   Go to ER if symptoms worsen.    Chief Complaint     Chief Complaint   Patient presents with    Earache     Pt c/o right ear pain and nasal drip that started yesterday and has taken no otc meds         History of Present Illness     Patient presents with right ear pain, post-nasal drip, and painful neck lymph nodes x 2 days. He has tried ibuprofen with minimal relief.         Review of Systems     Review of Systems   Constitutional:  Negative for chills, fatigue and fever.   HENT:  Positive for ear pain and postnasal drip. Negative for congestion, rhinorrhea, sinus pressure, sinus pain, sneezing and sore throat.    Eyes:  Negative for pain and visual disturbance.   Respiratory:  Negative for cough and shortness of breath.    Cardiovascular:  Negative for chest pain and palpitations.   Gastrointestinal:  Negative for abdominal pain, diarrhea, nausea and vomiting.   Genitourinary:  Negative for dysuria and hematuria.   Musculoskeletal:  Negative for arthralgias, back pain and myalgias.   Skin:  Negative for rash.   Neurological:  Negative for dizziness, seizures, syncope, numbness and headaches.   All other systems reviewed and are negative.        Current Medications       Current Outpatient Medications:     loratadine-pseudoephedrine (CLARITIN-D 24-HOUR)  mg per 24 hr tablet, Take 1 tablet by mouth daily, Disp: 15 tablet, Rfl: 0    busPIRone (BUSPAR) 5 mg tablet, , Disp: , Rfl:     cyclobenzaprine (FLEXERIL)  10 mg tablet, Take 1 tablet (10 mg total) by mouth 2 (two) times a day as needed for muscle spasms (Patient not taking: Reported on 12/9/2023), Disp: 21 tablet, Rfl: 0    cyclobenzaprine (FLEXERIL) 10 mg tablet, Take 1 tablet (10 mg total) by mouth daily at bedtime (Patient not taking: Reported on 6/6/2024), Disp: 14 tablet, Rfl: 0    escitalopram (LEXAPRO) 10 mg tablet, Take 10 mg by mouth daily (Patient not taking: Reported on 6/6/2024), Disp: , Rfl:     methylPREDNISolone 4 MG tablet therapy pack, Use as directed on package (Patient not taking: Reported on 6/6/2024), Disp: 21 each, Rfl: 0    naproxen (EC NAPROSYN) 500 MG EC tablet, Take 1 tablet (500 mg total) by mouth 2 (two) times a day with meals (Patient not taking: Reported on 6/6/2024), Disp: 30 tablet, Rfl: 0    Current Allergies     Allergies as of 07/08/2024 - Reviewed 07/08/2024   Allergen Reaction Noted    Amoxicillin Hives 02/19/2020              The following portions of the patient's history were reviewed and updated as appropriate: allergies, current medications, past family history, past medical history, past social history, past surgical history, and problem list.     Past Medical History:   Diagnosis Date    Anxiety        Past Surgical History:   Procedure Laterality Date    NO PAST SURGERIES         History reviewed. No pertinent family history.      Medications have been verified.        Objective     /58   Pulse 56   Temp (!) 97.3 °F (36.3 °C) (Tympanic)   Resp 18   SpO2 98%   No LMP for male patient.         Physical Exam     Physical Exam  Vitals and nursing note reviewed.   Constitutional:       Appearance: Normal appearance. He is normal weight.   HENT:      Head: Normocephalic and atraumatic.      Right Ear: Tympanic membrane, ear canal and external ear normal.      Left Ear: Tympanic membrane, ear canal and external ear normal.      Mouth/Throat:      Mouth: Mucous membranes are moist.      Pharynx: Oropharynx is clear.    Cardiovascular:      Rate and Rhythm: Normal rate and regular rhythm.      Heart sounds: Normal heart sounds.   Pulmonary:      Effort: Pulmonary effort is normal.      Breath sounds: Normal breath sounds.   Skin:     General: Skin is warm and dry.   Neurological:      General: No focal deficit present.      Mental Status: He is alert and oriented to person, place, and time.   Psychiatric:         Mood and Affect: Mood normal.         Behavior: Behavior normal.

## 2024-08-06 ENCOUNTER — OFFICE VISIT (OUTPATIENT)
Dept: URGENT CARE | Facility: CLINIC | Age: 29
End: 2024-08-06
Payer: MEDICARE

## 2024-08-06 VITALS
HEART RATE: 82 BPM | OXYGEN SATURATION: 99 % | SYSTOLIC BLOOD PRESSURE: 122 MMHG | DIASTOLIC BLOOD PRESSURE: 58 MMHG | RESPIRATION RATE: 18 BRPM | TEMPERATURE: 97.1 F

## 2024-08-06 DIAGNOSIS — R10.84 GENERALIZED ABDOMINAL PAIN: Primary | ICD-10-CM

## 2024-08-06 PROCEDURE — 99212 OFFICE O/P EST SF 10 MIN: CPT | Performed by: PHYSICIAN ASSISTANT

## 2024-08-06 PROCEDURE — G0463 HOSPITAL OUTPT CLINIC VISIT: HCPCS | Performed by: PHYSICIAN ASSISTANT

## 2024-08-06 NOTE — PROGRESS NOTES
[facility]        NAME: Renan Carpenter is a 28 y.o. male  : 1995    MRN: [id number]  DATE: 2024  TIME: 4:03 PM    Assessment and Plan   Generalized abdominal pain [R10.84]  1. Generalized abdominal pain              Patient Instructions     Patient Instructions   Recommended over the counter medication as needed for symptomatic relief.     Follow up with PCP in 3-5 days.  Proceed to  ER if symptoms worsen.    If tests are performed, our office will contact you with results only if changes need to made to the care plan discussed with you at the visit. [facility]      Chief Complaint     Chief Complaint   Patient presents with    Abdominal Pain     Pt c/o abdominal pain that has been going on for 2 days. Primary said has IBS. BM are loose         History of Present Illness       Abdominal Pain  This is a new problem. The current episode started yesterday. The onset quality is gradual. The problem occurs 2 to 4 times per day. The problem has been unchanged. The pain is located in the generalized abdominal region. The pain is mild. The quality of the pain is aching. The abdominal pain does not radiate. Associated symptoms include diarrhea. Pertinent negatives include no fever, flatus, headaches, hematochezia, melena, nausea or vomiting. The pain is aggravated by certain positions. Treatments tried: Immodium. His past medical history is significant for irritable bowel syndrome.       Review of Systems   Review of Systems   Constitutional:  Negative for activity change and fever.   Gastrointestinal:  Positive for abdominal pain and diarrhea. Negative for abdominal distention, flatus, hematochezia, melena, nausea and vomiting.   Neurological:  Negative for headaches.   All other systems reviewed and are negative.        Current Medications       Current Outpatient Medications:     loratadine-pseudoephedrine (CLARITIN-D 24-HOUR)  mg per 24 hr  tablet, Take 1 tablet by mouth daily, Disp: 15 tablet, Rfl: 0    busPIRone (BUSPAR) 5 mg tablet, , Disp: , Rfl:     cyclobenzaprine (FLEXERIL) 10 mg tablet, Take 1 tablet (10 mg total) by mouth 2 (two) times a day as needed for muscle spasms (Patient not taking: Reported on 12/9/2023), Disp: 21 tablet, Rfl: 0    cyclobenzaprine (FLEXERIL) 10 mg tablet, Take 1 tablet (10 mg total) by mouth daily at bedtime (Patient not taking: Reported on 6/6/2024), Disp: 14 tablet, Rfl: 0    escitalopram (LEXAPRO) 10 mg tablet, Take 10 mg by mouth daily (Patient not taking: Reported on 6/6/2024), Disp: , Rfl:     methylPREDNISolone 4 MG tablet therapy pack, Use as directed on package (Patient not taking: Reported on 6/6/2024), Disp: 21 each, Rfl: 0    naproxen (EC NAPROSYN) 500 MG EC tablet, Take 1 tablet (500 mg total) by mouth 2 (two) times a day with meals (Patient not taking: Reported on 6/6/2024), Disp: 30 tablet, Rfl: 0    Current Allergies     Allergies as of 08/06/2024 - Reviewed 08/06/2024   Allergen Reaction Noted    Amoxicillin Hives 02/19/2020            The following portions of the patient's history were reviewed and updated as appropriate: allergies, current medications, past family history, past medical history, past social history, past surgical history and problem list.     Past Medical History:   Diagnosis Date    Anxiety        Past Surgical History:   Procedure Laterality Date    NO PAST SURGERIES         History reviewed. No pertinent family history.      Medications have been verified.        Objective   /58   Pulse 82   Temp (!) 97.1 °F (36.2 °C) (Tympanic)   Resp 18   SpO2 99%        Physical Exam     Physical Exam  Vitals and nursing note reviewed.   Constitutional:       Appearance: He is well-developed.   Cardiovascular:      Rate and Rhythm: Normal rate and regular rhythm.   Pulmonary:      Effort: Pulmonary effort is normal.      Breath sounds: Normal breath sounds.   Abdominal:      General:  Abdomen is flat. Bowel sounds are normal. There is no distension.      Palpations: Abdomen is soft.      Tenderness: There is abdominal tenderness in the suprapubic area. There is no guarding or rebound. Negative signs include Del Valle's sign, McBurney's sign, psoas sign and obturator sign.   Skin:     General: Skin is warm and dry.      Capillary Refill: Capillary refill takes less than 2 seconds.   Neurological:      General: No focal deficit present.      Mental Status: He is alert and oriented to person, place, and time.   Psychiatric:         Mood and Affect: Mood normal.         Behavior: Behavior normal.

## 2024-08-08 ENCOUNTER — HOSPITAL ENCOUNTER (EMERGENCY)
Facility: HOSPITAL | Age: 29
Discharge: HOME | End: 2024-08-08
Attending: EMERGENCY MEDICINE
Payer: MEDICARE

## 2024-08-08 ENCOUNTER — APPOINTMENT (EMERGENCY)
Dept: CT IMAGING | Facility: HOSPITAL | Age: 29
End: 2024-08-08
Payer: MEDICARE

## 2024-08-08 VITALS
HEART RATE: 81 BPM | OXYGEN SATURATION: 100 % | HEIGHT: 66 IN | DIASTOLIC BLOOD PRESSURE: 60 MMHG | RESPIRATION RATE: 18 BRPM | TEMPERATURE: 97.1 F | WEIGHT: 138 LBS | SYSTOLIC BLOOD PRESSURE: 113 MMHG | BODY MASS INDEX: 22.18 KG/M2

## 2024-08-08 DIAGNOSIS — R11.0 NAUSEA: ICD-10-CM

## 2024-08-08 DIAGNOSIS — R10.9 ABDOMINAL PAIN: Primary | ICD-10-CM

## 2024-08-08 DIAGNOSIS — E87.6 HYPOKALEMIA: ICD-10-CM

## 2024-08-08 LAB
ALBUMIN SERPL BCG-MCNC: 4.3 G/DL (ref 3.5–5)
ALP SERPL-CCNC: 92 U/L (ref 34–104)
ALT SERPL W P-5'-P-CCNC: 12 U/L (ref 7–52)
ANION GAP SERPL CALCULATED.3IONS-SCNC: 6 MMOL/L (ref 4–13)
AST SERPL W P-5'-P-CCNC: 11 U/L (ref 13–39)
BASOPHILS # BLD AUTO: 0.05 THOUSANDS/ÂΜL (ref 0–0.1)
BASOPHILS NFR BLD AUTO: 1 % (ref 0–1)
BILIRUB SERPL-MCNC: 0.25 MG/DL (ref 0.2–1)
BILIRUB UR QL STRIP: NEGATIVE
BUN SERPL-MCNC: 8 MG/DL (ref 5–25)
CALCIUM SERPL-MCNC: 9.2 MG/DL (ref 8.4–10.2)
CHLORIDE SERPL-SCNC: 104 MMOL/L (ref 96–108)
CLARITY UR: CLEAR
CO2 SERPL-SCNC: 30 MMOL/L (ref 21–32)
COLOR UR: NORMAL
CREAT SERPL-MCNC: 0.74 MG/DL (ref 0.6–1.3)
EOSINOPHIL # BLD AUTO: 0.26 THOUSAND/ÂΜL (ref 0–0.61)
EOSINOPHIL NFR BLD AUTO: 3 % (ref 0–6)
ERYTHROCYTE [DISTWIDTH] IN BLOOD BY AUTOMATED COUNT: 13 % (ref 11.6–15.1)
GFR SERPL CREATININE-BSD FRML MDRD: 125 ML/MIN/1.73SQ M
GLUCOSE SERPL-MCNC: 93 MG/DL (ref 65–140)
GLUCOSE UR STRIP-MCNC: NEGATIVE MG/DL
HCT VFR BLD AUTO: 43.7 % (ref 36.5–49.3)
HGB BLD-MCNC: 14.9 G/DL (ref 12–17)
HGB UR QL STRIP.AUTO: NEGATIVE
IMM GRANULOCYTES # BLD AUTO: 0.02 THOUSAND/UL (ref 0–0.2)
IMM GRANULOCYTES NFR BLD AUTO: 0 % (ref 0–2)
KETONES UR STRIP-MCNC: NEGATIVE MG/DL
LEUKOCYTE ESTERASE UR QL STRIP: NEGATIVE
LIPASE SERPL-CCNC: 32 U/L (ref 11–82)
LYMPHOCYTES # BLD AUTO: 3.94 THOUSANDS/ÂΜL (ref 0.6–4.47)
LYMPHOCYTES NFR BLD AUTO: 41 % (ref 14–44)
MCH RBC QN AUTO: 29.6 PG (ref 26.8–34.3)
MCHC RBC AUTO-ENTMCNC: 34.1 G/DL (ref 31.4–37.4)
MCV RBC AUTO: 87 FL (ref 82–98)
MONOCYTES # BLD AUTO: 0.69 THOUSAND/ÂΜL (ref 0.17–1.22)
MONOCYTES NFR BLD AUTO: 7 % (ref 4–12)
NEUTROPHILS # BLD AUTO: 4.78 THOUSANDS/ÂΜL (ref 1.85–7.62)
NEUTS SEG NFR BLD AUTO: 48 % (ref 43–75)
NITRITE UR QL STRIP: NEGATIVE
NRBC BLD AUTO-RTO: 0 /100 WBCS
PH UR STRIP.AUTO: 7.5 [PH]
PLATELET # BLD AUTO: 290 THOUSANDS/UL (ref 149–390)
PMV BLD AUTO: 9.8 FL (ref 8.9–12.7)
POTASSIUM SERPL-SCNC: 3.3 MMOL/L (ref 3.5–5.3)
PROT SERPL-MCNC: 6.6 G/DL (ref 6.4–8.4)
PROT UR STRIP-MCNC: NEGATIVE MG/DL
RBC # BLD AUTO: 5.03 MILLION/UL (ref 3.88–5.62)
SODIUM SERPL-SCNC: 140 MMOL/L (ref 135–147)
SP GR UR STRIP.AUTO: 1.01 (ref 1–1.03)
UROBILINOGEN UR STRIP-ACNC: <2 MG/DL
WBC # BLD AUTO: 9.74 THOUSAND/UL (ref 4.31–10.16)

## 2024-08-08 PROCEDURE — 81003 URINALYSIS AUTO W/O SCOPE: CPT

## 2024-08-08 PROCEDURE — 85025 COMPLETE CBC W/AUTO DIFF WBC: CPT

## 2024-08-08 PROCEDURE — 74177 CT ABD & PELVIS W/CONTRAST: CPT

## 2024-08-08 PROCEDURE — 99285 EMERGENCY DEPT VISIT HI MDM: CPT

## 2024-08-08 PROCEDURE — 36415 COLL VENOUS BLD VENIPUNCTURE: CPT

## 2024-08-08 PROCEDURE — 80053 COMPREHEN METABOLIC PANEL: CPT

## 2024-08-08 PROCEDURE — 83690 ASSAY OF LIPASE: CPT

## 2024-08-08 PROCEDURE — 96375 TX/PRO/DX INJ NEW DRUG ADDON: CPT

## 2024-08-08 PROCEDURE — 99284 EMERGENCY DEPT VISIT MOD MDM: CPT

## 2024-08-08 PROCEDURE — 96374 THER/PROPH/DIAG INJ IV PUSH: CPT

## 2024-08-08 RX ORDER — POTASSIUM CHLORIDE 1500 MG/1
20 TABLET, EXTENDED RELEASE ORAL ONCE
Status: COMPLETED | OUTPATIENT
Start: 2024-08-08 | End: 2024-08-08

## 2024-08-08 RX ORDER — KETOROLAC TROMETHAMINE 30 MG/ML
15 INJECTION, SOLUTION INTRAMUSCULAR; INTRAVENOUS ONCE
Status: COMPLETED | OUTPATIENT
Start: 2024-08-08 | End: 2024-08-08

## 2024-08-08 RX ORDER — ONDANSETRON 4 MG/1
4 TABLET, ORALLY DISINTEGRATING ORAL EVERY 8 HOURS PRN
Qty: 9 TABLET | Refills: 0 | Status: SHIPPED | OUTPATIENT
Start: 2024-08-08 | End: 2024-08-11

## 2024-08-08 RX ORDER — ONDANSETRON 2 MG/ML
4 INJECTION INTRAMUSCULAR; INTRAVENOUS ONCE
Status: DISCONTINUED | OUTPATIENT
Start: 2024-08-08 | End: 2024-08-08 | Stop reason: HOSPADM

## 2024-08-08 RX ORDER — ACETAMINOPHEN 10 MG/ML
1000 INJECTION, SOLUTION INTRAVENOUS ONCE
Status: COMPLETED | OUTPATIENT
Start: 2024-08-08 | End: 2024-08-08

## 2024-08-08 RX ADMIN — KETOROLAC TROMETHAMINE 15 MG: 30 INJECTION, SOLUTION INTRAMUSCULAR; INTRAVENOUS at 05:02

## 2024-08-08 RX ADMIN — POTASSIUM CHLORIDE 20 MEQ: 1500 TABLET, EXTENDED RELEASE ORAL at 04:34

## 2024-08-08 RX ADMIN — IOHEXOL 100 ML: 350 INJECTION, SOLUTION INTRAVENOUS at 05:45

## 2024-08-08 RX ADMIN — ACETAMINOPHEN 1000 MG: 10 INJECTION INTRAVENOUS at 05:03

## 2024-08-08 NOTE — Clinical Note
Renan Carpenter was seen and treated in our emergency department on 8/8/2024.                Diagnosis:     Renan  may return to work on return date.    He may return on this date: 08/10/2024         If you have any questions or concerns, please don't hesitate to call.      Grecia Menendez PA-C    ______________________________           _______________          _______________  Hospital Representative                              Date                                Time

## 2024-08-08 NOTE — ED TRIAGE NOTES
Ambulatory w/complaint of right flank pain which radiates into right groin which started x2 days ago; states history of kidney stones; denies N/V, fever; nothing taken for pain PTA; denies problems w/urination and/or blood in urine

## 2024-08-08 NOTE — ED PROVIDER NOTES
History  Chief Complaint   Patient presents with    Flank Pain     Ambulatory w/complaint of right flank pain which radiates into right groin which started x2 days ago; states history of kidney stones; denies N/V, fever; nothing taken for pain PTA; denies problems w/urination and/or blood in urine     Patient is a 28-year-old male with a past medical history of kidney stones, who presents to the emergency room for right lower quadrant pain.  Patient reports pain for x3 days that waxes and wanes.  Associated nausea.  Denies any other symptoms.  No medication for symptom relief.          Prior to Admission Medications   Prescriptions Last Dose Informant Patient Reported? Taking?   busPIRone (BUSPAR) 5 mg tablet  Self Yes No   Patient not taking: Reported on 12/9/2023   cyclobenzaprine (FLEXERIL) 10 mg tablet  Self No No   Sig: Take 1 tablet (10 mg total) by mouth 2 (two) times a day as needed for muscle spasms   Patient not taking: Reported on 12/9/2023   cyclobenzaprine (FLEXERIL) 10 mg tablet   No No   Sig: Take 1 tablet (10 mg total) by mouth daily at bedtime   Patient not taking: Reported on 6/6/2024   escitalopram (LEXAPRO) 10 mg tablet   Yes No   Sig: Take 10 mg by mouth daily   Patient not taking: Reported on 6/6/2024   loratadine-pseudoephedrine (CLARITIN-D 24-HOUR)  mg per 24 hr tablet   No No   Sig: Take 1 tablet by mouth daily   methylPREDNISolone 4 MG tablet therapy pack   No No   Sig: Use as directed on package   Patient not taking: Reported on 6/6/2024   naproxen (EC NAPROSYN) 500 MG EC tablet   No No   Sig: Take 1 tablet (500 mg total) by mouth 2 (two) times a day with meals   Patient not taking: Reported on 6/6/2024      Facility-Administered Medications: None       Past Medical History:   Diagnosis Date    Anxiety        Past Surgical History:   Procedure Laterality Date    NO PAST SURGERIES         History reviewed. No pertinent family history.  I have reviewed and agree with the history as  documented.    E-Cigarette/Vaping    E-Cigarette Use Never User      E-Cigarette/Vaping Substances    Nicotine No     THC No     CBD No     Flavoring No     Other No     Unknown No      Social History     Tobacco Use    Smoking status: Every Day     Current packs/day: 1.00     Types: Cigarettes    Smokeless tobacco: Never    Tobacco comments:     quit a week ago.    Vaping Use    Vaping status: Never Used   Substance Use Topics    Alcohol use: Yes     Comment: occ    Drug use: Never       Review of Systems   Constitutional:  Negative for chills and fever.   HENT:  Negative for ear pain, sore throat, trouble swallowing and voice change.    Eyes:  Negative for pain and visual disturbance.   Respiratory:  Negative for cough and shortness of breath.    Cardiovascular:  Negative for chest pain and palpitations.   Gastrointestinal:  Positive for abdominal pain and nausea. Negative for vomiting.   Genitourinary:  Negative for dysuria, flank pain and hematuria.   Musculoskeletal:  Negative for arthralgias and back pain.   Skin:  Negative for color change and rash.   Neurological:  Negative for seizures, syncope and headaches.   Psychiatric/Behavioral:  Negative for confusion.    All other systems reviewed and are negative.      Physical Exam  Physical Exam  Vitals and nursing note reviewed.   Constitutional:       General: He is not in acute distress.     Appearance: Normal appearance. He is well-developed.   HENT:      Head: Normocephalic and atraumatic.   Eyes:      Conjunctiva/sclera: Conjunctivae normal.   Cardiovascular:      Rate and Rhythm: Normal rate and regular rhythm.      Pulses: Normal pulses.      Heart sounds: No murmur heard.  Pulmonary:      Effort: Pulmonary effort is normal. No respiratory distress.      Breath sounds: Normal breath sounds.   Abdominal:      Palpations: Abdomen is soft.      Tenderness: There is no abdominal tenderness. There is no right CVA tenderness or left CVA tenderness.    Musculoskeletal:         General: No swelling.      Cervical back: Neck supple.   Skin:     General: Skin is warm and dry.      Capillary Refill: Capillary refill takes less than 2 seconds.   Neurological:      Mental Status: He is alert.   Psychiatric:         Mood and Affect: Mood normal.         Vital Signs  ED Triage Vitals   Temperature Pulse Respirations Blood Pressure SpO2   08/08/24 0320 08/08/24 0320 08/08/24 0320 08/08/24 0320 08/08/24 0320   (!) 97.1 °F (36.2 °C) 81 18 113/60 100 %      Temp Source Heart Rate Source Patient Position - Orthostatic VS BP Location FiO2 (%)   08/08/24 0320 08/08/24 0320 08/08/24 0320 08/08/24 0320 --   Tympanic Monitor Sitting Left arm       Pain Score       08/08/24 0321       4           Vitals:    08/08/24 0320   BP: 113/60   Pulse: 81   Patient Position - Orthostatic VS: Sitting         Visual Acuity      ED Medications  Medications   ondansetron (ZOFRAN) injection 4 mg (4 mg Intravenous Not Given 8/8/24 0551)   potassium chloride (Klor-Con M20) CR tablet 20 mEq (20 mEq Oral Given 8/8/24 0434)   acetaminophen (Ofirmev) injection 1,000 mg (0 mg Intravenous Stopped 8/8/24 0518)   ketorolac (TORADOL) injection 15 mg (15 mg Intravenous Given 8/8/24 0502)   iohexol (OMNIPAQUE) 350 MG/ML injection (MULTI-DOSE) 100 mL (100 mL Intravenous Given 8/8/24 0545)       Diagnostic Studies  Results Reviewed       Procedure Component Value Units Date/Time    Comprehensive metabolic panel [[id number]]  (Abnormal) Collected: 08/08/24 0345    Lab Status: Final result Specimen: Blood from Arm, Right Updated: 08/08/24 0407     Sodium 140 mmol/L      Potassium 3.3 mmol/L      Chloride 104 mmol/L      CO2 30 mmol/L      ANION GAP 6 mmol/L      BUN 8 mg/dL      Creatinine 0.74 mg/dL      Glucose 93 mg/dL      Calcium 9.2 mg/dL      AST 11 U/L      ALT 12 U/L      Alkaline Phosphatase 92 U/L      Total Protein 6.6 g/dL      Albumin 4.3 g/dL      Total Bilirubin 0.25 mg/dL      eGFR 125  ml/min/1.73sq m     Narrative:      National Kidney Disease Foundation guidelines for Chronic Kidney Disease (CKD):     Stage 1 with normal or high GFR (GFR > 90 mL/min/1.73 square meters)    Stage 2 Mild CKD (GFR = 60-89 mL/min/1.73 square meters)    Stage 3A Moderate CKD (GFR = 45-59 mL/min/1.73 square meters)    Stage 3B Moderate CKD (GFR = 30-44 mL/min/1.73 square meters)    Stage 4 Severe CKD (GFR = 15-29 mL/min/1.73 square meters)    Stage 5 End Stage CKD (GFR <15 mL/min/1.73 square meters)  Note: GFR calculation is accurate only with a steady state creatinine    Lipase [[id number]]  (Normal) Collected: 08/08/24 0345    Lab Status: Final result Specimen: Blood from Arm, Right Updated: 08/08/24 0407     Lipase 32 u/L     UA w Reflex to Microscopic w Reflex to Culture [[id number]] Collected: 08/08/24 0354    Lab Status: Final result Specimen: Urine, Clean Catch Updated: 08/08/24 0400     Color, UA Light Yellow     Clarity, UA Clear     Specific Gravity, UA 1.010     pH, UA 7.5     Leukocytes, UA Negative     Nitrite, UA Negative     Protein, UA Negative mg/dl      Glucose, UA Negative mg/dl      Ketones, UA Negative mg/dl      Urobilinogen, UA <2.0 mg/dl      Bilirubin, UA Negative     Occult Blood, UA Negative    CBC and differential [[id number]] Collected: 08/08/24 0345    Lab Status: Final result Specimen: Blood from Arm, Right Updated: 08/08/24 0352     WBC 9.74 Thousand/uL      RBC 5.03 Million/uL      Hemoglobin 14.9 g/dL      Hematocrit 43.7 %      MCV 87 fL      MCH 29.6 pg      MCHC 34.1 g/dL      RDW 13.0 %      MPV 9.8 fL      Platelets 290 Thousands/uL      nRBC 0 /100 WBCs      Segmented % 48 %      Immature Grans % 0 %      Lymphocytes % 41 %      Monocytes % 7 %      Eosinophils Relative 3 %      Basophils Relative 1 %      Absolute Neutrophils 4.78 Thousands/µL      Absolute Immature Grans 0.02 Thousand/uL      Absolute Lymphocytes 3.94 Thousands/µL      Absolute Monocytes 0.69 Thousand/µL       Eosinophils Absolute 0.26 Thousand/µL      Basophils Absolute 0.05 Thousands/µL                    CT abdomen pelvis with contrast   Final Result by Brandon King MD (08/08 0554)      No acute findings in the abdomen or pelvis.      Normal appendix visualized.         Workstation performed: [id number]                    Procedures  Procedures         ED Course  ED Course as of 08/08/24 0611   u Aug 08, 2024   0407 Potassium(!): 3.3                                 SBIRT 20yo+      Flowsheet Row Most Recent Value   Initial Alcohol Screen: US AUDIT-C     1. How often do you have a drink containing alcohol? 0 Filed at: 08/08/2024 0322   2. How many drinks containing alcohol do you have on a typical day you are drinking?  0 Filed at: 08/08/2024 0322   3a. Male UNDER 65: How often do you have five or more drinks on one occasion? 0 Filed at: 08/08/2024 0322   Audit-C Score 0 Filed at: 08/08/2024 0322   JAMES: How many times in the past year have you...    Used an illegal drug or used a prescription medication for non-medical reasons? Never Filed at: 08/08/2024 0322                      Medical Decision Making  28-year-old male presenting for RLQ pain with associated nausea.  Vital signs stable.  No acute physical exam findings.  Patient well-appearing.  Potassium 3.3, repleted p.o.  Otherwise, labs stable.  No acute CT findings.  Discussed ED results with patient.  Provided patient education and discussed return precautions.  Patient to follow-up with his PCP and return to the ER for any worsening symptoms.  Patient understands and agrees to discharge plan.    Amount and/or Complexity of Data Reviewed  Labs: ordered. Decision-making details documented in ED Course.  Radiology: ordered.    Risk  Prescription drug management.                 Disposition  Final diagnoses:   Abdominal pain   Nausea   Hypokalemia     Time reflects when diagnosis was documented in both MDM as applicable and the Disposition within this  note       Time User Action Codes Description Comment    8/8/2024  6:04 AM Grecia Menendez Add [R10.9] Abdominal pain     8/8/2024  6:04 AM Grecia Menendez Add [R11.0] Nausea     8/8/2024  6:04 AM Grecia Menendez Add [E87.6] Hypokalemia           ED Disposition       ED Disposition   Discharge    Condition   Stable    Date/Time   Thu Aug 8, 2024 0604    Comment   Renan Carpenter discharge to home/self care.                   Follow-up Information       Follow up With Specialties Details Why Contact Info Additional Information    Rafiq Lizama MD Pediatrics   [address] [facility] [address]  [phone]       [facility] Emergency Medicine Go to  If symptoms worsen [address] [place] [id number] [phone] [facility], [address], [place], [address]            Patient's Medications   Discharge Prescriptions    ONDANSETRON (ZOFRAN-ODT) 4 MG DISINTEGRATING TABLET    Take 1 tablet (4 mg total) by mouth every 8 (eight) hours as needed for nausea or vomiting for up to 3 days       Start Date: 8/8/2024  End Date: 8/11/2024       Order Dose: 4 mg       Quantity: 9 tablet    Refills: 0       No discharge procedures on file.    PDMP Review       None            ED Provider  Electronically Signed by             Grecia Menendez PA-C  08/08/24 0611

## 2024-08-24 ENCOUNTER — APPOINTMENT (OUTPATIENT)
Dept: RADIOLOGY | Facility: CLINIC | Age: 29
End: 2024-08-24
Payer: MEDICARE

## 2024-08-24 ENCOUNTER — OFFICE VISIT (OUTPATIENT)
Dept: URGENT CARE | Facility: CLINIC | Age: 29
End: 2024-08-24
Payer: MEDICARE

## 2024-08-24 ENCOUNTER — APPOINTMENT (OUTPATIENT)
Dept: URGENT CARE | Facility: CLINIC | Age: 29
End: 2024-08-24
Payer: MEDICARE

## 2024-08-24 VITALS
WEIGHT: 138 LBS | HEART RATE: 62 BPM | DIASTOLIC BLOOD PRESSURE: 79 MMHG | HEIGHT: 66 IN | BODY MASS INDEX: 22.18 KG/M2 | SYSTOLIC BLOOD PRESSURE: 121 MMHG | RESPIRATION RATE: 19 BRPM | OXYGEN SATURATION: 98 % | TEMPERATURE: 98 F

## 2024-08-24 DIAGNOSIS — R07.81 RIB PAIN ON LEFT SIDE: ICD-10-CM

## 2024-08-24 DIAGNOSIS — S20.212A CONTUSION OF RIB ON LEFT SIDE, INITIAL ENCOUNTER: Primary | ICD-10-CM

## 2024-08-24 PROCEDURE — 99214 OFFICE O/P EST MOD 30 MIN: CPT | Performed by: PHYSICIAN ASSISTANT

## 2024-08-24 PROCEDURE — G0463 HOSPITAL OUTPT CLINIC VISIT: HCPCS | Performed by: PHYSICIAN ASSISTANT

## 2024-08-24 PROCEDURE — 71101 X-RAY EXAM UNILAT RIBS/CHEST: CPT
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
## 2024-08-26 ENCOUNTER — APPOINTMENT (EMERGENCY)
Dept: RADIOLOGY | Facility: HOSPITAL | Age: 29
End: 2024-08-26
Payer: MEDICARE

## 2024-08-26 ENCOUNTER — HOSPITAL ENCOUNTER (EMERGENCY)
Facility: HOSPITAL | Age: 29
Discharge: HOME | End: 2024-08-26
Attending: EMERGENCY MEDICINE
Payer: MEDICARE

## 2024-08-26 VITALS
OXYGEN SATURATION: 100 % | RESPIRATION RATE: 18 BRPM | DIASTOLIC BLOOD PRESSURE: 77 MMHG | BODY MASS INDEX: 21.79 KG/M2 | HEART RATE: 61 BPM | WEIGHT: 135 LBS | SYSTOLIC BLOOD PRESSURE: 108 MMHG | TEMPERATURE: 98.2 F

## 2024-08-26 DIAGNOSIS — M89.8X1 PAIN OF LEFT CLAVICLE: Primary | ICD-10-CM

## 2024-08-26 PROCEDURE — 99283 EMERGENCY DEPT VISIT LOW MDM: CPT

## 2024-08-26 PROCEDURE — 96372 THER/PROPH/DIAG INJ SC/IM: CPT

## 2024-08-26 PROCEDURE — 73000 X-RAY EXAM OF COLLAR BONE: CPT

## 2024-08-26 PROCEDURE — 99284 EMERGENCY DEPT VISIT MOD MDM: CPT

## 2024-08-26 RX ORDER — ACETAMINOPHEN 325 MG/1
975 TABLET ORAL ONCE
Status: COMPLETED | OUTPATIENT
Start: 2024-08-26 | End: 2024-08-26

## 2024-08-26 RX ORDER — IBUPROFEN 600 MG/1
600 TABLET, FILM COATED ORAL EVERY 6 HOURS PRN
Qty: 30 TABLET | Refills: 0 | Status: SHIPPED | OUTPATIENT
Start: 2024-08-26

## 2024-08-26 RX ORDER — OXYCODONE HYDROCHLORIDE 5 MG/1
5 TABLET ORAL ONCE
Status: COMPLETED | OUTPATIENT
Start: 2024-08-26 | End: 2024-08-26

## 2024-08-26 RX ORDER — ACETAMINOPHEN 500 MG
1000 TABLET ORAL EVERY 8 HOURS
Qty: 40 TABLET | Refills: 0 | Status: SHIPPED | OUTPATIENT
Start: 2024-08-26

## 2024-08-26 RX ORDER — KETOROLAC TROMETHAMINE 30 MG/ML
15 INJECTION, SOLUTION INTRAMUSCULAR; INTRAVENOUS ONCE
Status: COMPLETED | OUTPATIENT
Start: 2024-08-26 | End: 2024-08-26

## 2024-08-26 RX ADMIN — KETOROLAC TROMETHAMINE 15 MG: 30 INJECTION, SOLUTION INTRAMUSCULAR; INTRAVENOUS at 23:12

## 2024-08-26 RX ADMIN — OXYCODONE HYDROCHLORIDE 5 MG: 5 TABLET ORAL at 23:12

## 2024-08-26 RX ADMIN — ACETAMINOPHEN 975 MG: 325 TABLET, FILM COATED ORAL at 23:12

## 2024-08-26 NOTE — Clinical Note
Renan Carpenter was seen and treated in our emergency department on 8/26/2024.                Diagnosis:     Renan  .    He may return on this date:     Renan was seen in the ED for evaluation of left clavicle injury.  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  Once seen by orthopedics, further work restrictions can be applied. Thank you      If you have any questions or concerns, please don't hesitate to call.      Marbin Bess PA-C    ______________________________           _______________          _______________  Hospital Representative                              Date                                Time

## 2024-08-27 NOTE — DISCHARGE INSTRUCTIONS
Today I provided prescription for Tylenol, ibuprofen.  Take as directed for pain.  Follow-up with orthopedics for further evaluation and treatment of left clavicle pain.  Wear the provided sling with activity for comfort until seen by orthopedics.   Return to ED for any new or worsening symptoms.
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
## 2024-10-21 ENCOUNTER — OFFICE VISIT (OUTPATIENT)
Dept: URGENT CARE | Facility: CLINIC | Age: 29
End: 2024-10-21
Payer: MEDICARE

## 2024-10-21 VITALS
RESPIRATION RATE: 17 BRPM | HEIGHT: 66 IN | HEART RATE: 62 BPM | DIASTOLIC BLOOD PRESSURE: 74 MMHG | OXYGEN SATURATION: 98 % | TEMPERATURE: 97.5 F | SYSTOLIC BLOOD PRESSURE: 114 MMHG | BODY MASS INDEX: 20.73 KG/M2 | WEIGHT: 129 LBS

## 2024-10-21 DIAGNOSIS — H66.001 ACUTE SUPPURATIVE OTITIS MEDIA OF RIGHT EAR WITHOUT SPONTANEOUS RUPTURE OF TYMPANIC MEMBRANE, RECURRENCE NOT SPECIFIED: Primary | ICD-10-CM

## 2024-10-21 DIAGNOSIS — J06.9 ACUTE URI: ICD-10-CM

## 2024-10-21 PROCEDURE — 99213 OFFICE O/P EST LOW 20 MIN: CPT | Performed by: PHYSICIAN ASSISTANT

## 2024-10-21 PROCEDURE — G0463 HOSPITAL OUTPT CLINIC VISIT: HCPCS | Performed by: PHYSICIAN ASSISTANT

## 2024-10-21 RX ORDER — METHYLPREDNISOLONE 4 MG/1
TABLET ORAL
Qty: 21 TABLET | Refills: 0 | Status: SHIPPED | OUTPATIENT
Start: 2024-10-21

## 2024-10-21 RX ORDER — AZITHROMYCIN 250 MG/1
TABLET, FILM COATED ORAL
Qty: 6 TABLET | Refills: 0 | Status: SHIPPED | OUTPATIENT
Start: 2024-10-21 | End: 2024-10-25

## 2024-10-21 NOTE — PATIENT INSTRUCTIONS
Recommended oral steroid and oral antibiotic.    Take over the counter medications as needed for symptomatic management.       Follow up with PCP in 3-5 days.  Proceed to  ER if symptoms worsen.    If tests are performed, our office will contact you with results only if changes need to made to the care plan discussed with you at the visit. You can review your full results on [facility].

## 2024-10-21 NOTE — PROGRESS NOTES
[facility]        NAME: Renan Carpenter is a 28 y.o. male  : 1995    MRN: [id number]  DATE: 2024  TIME: 10:38 AM    Assessment and Plan   Acute suppurative otitis media of right ear without spontaneous rupture of tympanic membrane, recurrence not specified [H66.001]  1. Acute suppurative otitis media of right ear without spontaneous rupture of tympanic membrane, recurrence not specified  azithromycin (ZITHROMAX) 250 mg tablet      2. Acute URI  methylPREDNISolone 4 MG tablet therapy pack            Patient Instructions     Patient Instructions   Recommended oral steroid and oral antibiotic.    Take over the counter medications as needed for symptomatic management.       Follow up with PCP in 3-5 days.  Proceed to  ER if symptoms worsen.    If tests are performed, our office will contact you with results only if changes need to made to the care plan discussed with you at the visit. [facility]        Chief Complaint     Chief Complaint   Patient presents with    Cold Like Symptoms     Pt c/o of sinus congestion, right sided earache and swollen gland on right side of neck for 3 days, No OTC meds taken.          History of Present Illness       URI   This is a new problem. Episode onset: 3 days. The problem has been unchanged. There has been no fever. Associated symptoms include congestion, ear pain and swollen glands. Pertinent negatives include no coughing, rhinorrhea, sore throat or wheezing. He has tried nothing for the symptoms.       Review of Systems   Review of Systems   HENT:  Positive for congestion and ear pain. Negative for rhinorrhea and sore throat.    Respiratory:  Negative for cough and wheezing.    All other systems reviewed and are negative.        Current Medications       Current Outpatient Medications:     azithromycin (ZITHROMAX) 250 mg tablet, Take 2 tablets today then 1 tablet daily x 4 days, Disp: 6 tablet, Rfl: 0     methylPREDNISolone 4 MG tablet therapy pack, Use as directed on package, Disp: 21 tablet, Rfl: 0    acetaminophen (TYLENOL) 500 mg tablet, Take 2 tablets (1,000 mg total) by mouth every 8 (eight) hours (Patient not taking: Reported on 10/21/2024), Disp: 40 tablet, Rfl: 0    busPIRone (BUSPAR) 5 mg tablet, , Disp: , Rfl:     cyclobenzaprine (FLEXERIL) 10 mg tablet, Take 1 tablet (10 mg total) by mouth 2 (two) times a day as needed for muscle spasms (Patient not taking: Reported on 12/9/2023), Disp: 21 tablet, Rfl: 0    cyclobenzaprine (FLEXERIL) 10 mg tablet, Take 1 tablet (10 mg total) by mouth daily at bedtime (Patient not taking: Reported on 6/6/2024), Disp: 14 tablet, Rfl: 0    escitalopram (LEXAPRO) 10 mg tablet, Take 10 mg by mouth daily (Patient not taking: Reported on 6/6/2024), Disp: , Rfl:     ibuprofen (MOTRIN) 600 mg tablet, Take 1 tablet (600 mg total) by mouth every 6 (six) hours as needed for mild pain (Patient not taking: Reported on 10/21/2024), Disp: 30 tablet, Rfl: 0    loratadine-pseudoephedrine (CLARITIN-D 24-HOUR)  mg per 24 hr tablet, Take 1 tablet by mouth daily (Patient not taking: Reported on 8/24/2024), Disp: 15 tablet, Rfl: 0    methylPREDNISolone 4 MG tablet therapy pack, Use as directed on package (Patient not taking: Reported on 6/6/2024), Disp: 21 each, Rfl: 0    naproxen (EC NAPROSYN) 500 MG EC tablet, Take 1 tablet (500 mg total) by mouth 2 (two) times a day with meals (Patient not taking: Reported on 6/6/2024), Disp: 30 tablet, Rfl: 0    ondansetron (ZOFRAN-ODT) 4 mg disintegrating tablet, Take 1 tablet (4 mg total) by mouth every 8 (eight) hours as needed for nausea or vomiting for up to 3 days, Disp: 9 tablet, Rfl: 0    Current Allergies     Allergies as of 10/21/2024 - Reviewed 10/21/2024   Allergen Reaction Noted    Amoxicillin Hives 02/19/2020            The following portions of the patient's history were reviewed and updated as appropriate: allergies, current  "medications, past family history, past medical history, past social history, past surgical history and problem list.     Past Medical History:   Diagnosis Date    Anxiety        Past Surgical History:   Procedure Laterality Date    NO PAST SURGERIES         History reviewed. No pertinent family history.      Medications have been verified.        Objective   /74   Pulse 62   Temp 97.5 °F (36.4 °C)   Resp 17   Ht 5' 6\" (1.676 m)   Wt 58.5 kg (129 lb)   SpO2 98%   BMI 20.82 kg/m²        Physical Exam     Physical Exam  Vitals and nursing note reviewed.   Constitutional:       Appearance: Normal appearance.   HENT:      Right Ear: Ear canal and external ear normal. Tympanic membrane is erythematous.      Left Ear: Tympanic membrane, ear canal and external ear normal.      Nose: Congestion present.      Mouth/Throat:      Mouth: Mucous membranes are moist.   Cardiovascular:      Rate and Rhythm: Normal rate and regular rhythm.   Pulmonary:      Effort: Pulmonary effort is normal.      Breath sounds: Normal breath sounds.   Lymphadenopathy:      Cervical: Cervical adenopathy present.   Skin:     General: Skin is warm and dry.   Neurological:      General: No focal deficit present.      Mental Status: He is alert and oriented to person, place, and time.   Psychiatric:         Mood and Affect: Mood normal.         Behavior: Behavior normal.                   "

## 2024-12-09 ENCOUNTER — TELEMEDICINE (OUTPATIENT)
Dept: OTHER | Facility: HOSPITAL | Age: 29
End: 2024-12-09
Payer: MEDICARE

## 2024-12-09 VITALS — OXYGEN SATURATION: 99 %

## 2024-12-09 DIAGNOSIS — K52.9 GASTROENTERITIS: Primary | ICD-10-CM

## 2024-12-09 PROCEDURE — 99213 OFFICE O/P EST LOW 20 MIN: CPT | Performed by: NURSE PRACTITIONER

## 2024-12-09 RX ORDER — ONDANSETRON 4 MG/1
4 TABLET, FILM COATED ORAL EVERY 8 HOURS PRN
Qty: 6 TABLET | Refills: 0 | Status: SHIPPED | OUTPATIENT
Start: 2024-12-09 | End: 2024-12-11

## 2024-12-09 NOTE — PROGRESS NOTES
Virtual Regular Visit  Name: Renan Carpenter      : 1995      MRN: [id number]  Encounter Provider: SOWMYA Clarke  Encounter Date: 2024   Encounter department: VIRTUAL CARE       Verification of patient location:  Patient is located at Home in the following state in which I hold an active license [place] :  Assessment & Plan  Gastroenteritis    Orders:    ondansetron (ZOFRAN) 4 mg tablet; Take 1 tablet (4 mg total) by mouth every 8 (eight) hours as needed for nausea or vomiting for up to 2 days        Encounter provider SOWMYA Clarke    The patient was identified by name and date of birth. Renan Carpenter was informed that this is a telemedicine visit and that the visit is being conducted through the Epic Embedded platform. He agrees to proceed..  My office door was closed. No one else was in the room.  He acknowledged consent and understanding of privacy and security of the video platform. The patient has agreed to participate and understands they can discontinue the visit at any time.    Patient is aware this is a billable service.     History was obtained from: History obtained from: patient  History of Present Illness     This is a 29 year old male here today for video visit.  He states he is having nausea, vomiting and diarrhea.  He states symptoms started yesterday. He states he is having some abd pain.  He states his daughter and dad have same symptoms.  No fevers but has chills.  He states the last time he vomited was 20 minutes and had diarrhea about 20 minutes.  He is not having persistent diarrhea.        Review of Systems   Constitutional:  Positive for chills and fatigue. Negative for activity change and fever.   Respiratory: Negative.     Cardiovascular: Negative.    Gastrointestinal:  Positive for abdominal pain, nausea and vomiting.   Skin: Negative.    Neurological: Negative.    Psychiatric/Behavioral: Negative.         Objective   SpO2 99%     Physical Exam  Constitutional:        General: He is not in acute distress.     Appearance: Normal appearance. He is not ill-appearing or toxic-appearing.   Pulmonary:      Effort: Pulmonary effort is normal. No respiratory distress.   Abdominal:      Tenderness: There is no abdominal tenderness.   Neurological:      Mental Status: He is alert and oriented to person, place, and time.   Psychiatric:         Mood and Affect: Mood normal.         Behavior: Behavior normal.         Thought Content: Thought content normal.         Judgment: Judgment normal.         Visit Time  Total Visit Duration:  minutes not including the time spent for establishing the audio/video connection.

## 2024-12-09 NOTE — PATIENT INSTRUCTIONS
"This is most likely viral gastroenteritis.  Zofran as prescribed.   Rest and drink extra fluids.  Increase clear liquids such as sports drinks or Gatorade.  Advance to bland foods. Follow up with PCP if no improvement.  Go to ER with any worsening symptoms, abd pain or vomiting.     Patient Education     Viral gastroenteritis in adults   The Basics   Written by the doctors and editors at [facility]   What is viral gastroenteritis? -- Viral gastroenteritis is an infection that can cause diarrhea and vomiting. It happens when a person's stomach and intestines get infected with a virus (figure 1). One of the most common causes of gastroenteritis is norovirus. But other viruses can cause it, too.  People can get viral gastroenteritis if they:   Touch an infected person or a surface with the virus on it, and then don't wash their hands   Eat foods or drink liquids with the virus in them. If people with the virus don't wash their hands, they can spread it to food or liquids they touch.  What are the symptoms of viral gastroenteritis? -- The infection causes diarrhea and vomiting. People can have either diarrhea or vomiting, or both. These symptoms usually start suddenly, and can be severe.  Viral gastroenteritis can also cause:   Fever   Headache or muscle aches   Belly pain or cramping   Loss of appetite  If you have a lot of diarrhea and vomiting, your body can lose too much water. This is known as \"dehydration.\" Dehydration can make you feel thirsty, tired, dizzy, or even confused. It can also make your urine look dark yellow.  Severe dehydration can be life-threatening. Older people are more likely to get severe dehydration.  Will I need tests? -- Not usually. Your doctor or nurse should be able to tell if you have viral gastroenteritis by learning about your symptoms and doing an exam. But the doctor or nurse might do tests to check for dehydration or to see which virus is causing the infection. These tests can " "include:   Blood tests   Urine tests   Tests on a sample of bowel movement  Is there anything I can do on my own to feel better? -- Yes. You need to replace the body's fluids that are lost through vomiting and diarrhea:   Drink fluids when you are able. It might help to take small sips every 15 to 30 minutes. Try to drink more as you start to feel better.   When you have a lot of vomiting or diarrhea, your body loses both water and salt. Drinking fluids that contain some salt can help replace what your body has lost. Examples include \"oral rehydration solutions,\" sports drinks, and broth. If you drink a lot of plain water, make sure you are also eating. This will help your body keep the right salt and water balance.   Avoid drinks with a lot of sugar, like juice or soda. Avoid alcohol, too.   Eat when you are able. If you can keep food down, it's best to eat lean meats, fruits, vegetables, and whole-grain breads and cereals. Avoid eating foods with a lot of fat or sugar, which can make symptoms worse.  If you are an adult younger than 65 and you have a new bout of diarrhea, and no fever and no blood in your bowel movements, you can take medicine to stop diarrhea such as loperamide (brand name: Imodium) for 1 to 2 days. But if you are older than 65, have a fever, or have blood in your bowel movements, do not take these medicines without checking with your doctor.  If you have diabetes, you might need to check your blood sugar more often until you feel better. Ask your doctor or nurse about this.  Should I call the doctor or nurse? -- Call the doctor or nurse if you:   Have any symptoms of dehydration, like feeling very tired, thirsty, dizzy, or confused   Have diarrhea or vomiting that lasts longer than a few days   Vomit up blood, have bloody diarrhea, or have severe belly pain   Haven't been able to drink anything for many hours   Haven't needed to urinate in the past 6 to 8 hours (during the day)  How is viral " "gastroenteritis treated? -- Most people do not need any treatment, because their symptoms will get better on their own. But people with severe dehydration might need treatment in the hospital. This involves getting fluids through a thin tube that goes into a vein, called an \"IV.\"  Doctors do not treat viral gastroenteritis with antibiotics. That's because antibiotics treat infections that are caused by bacteria, not viruses.  Can viral gastroenteritis be prevented? -- Sometimes. To lower the chance of getting or spreading the infection, wash your hands well with soap and water:   After you use the bathroom   Before you eat   Before you prepare food  Also, if you are caring for a child in diapers, wash your hands well after changing diapers. Do not change diapers near where you cook or eat food.  All topics are updated as new evidence becomes available and our peer review process is complete.  This topic retrieved from [employer] on: Mar 06, 2024.  Topic 53492 Version 17.0  Release: 32.2.4 - C32.64  © 2024 UpToDate, Inc. and/or its affiliates. All rights reserved.  figure 1: Digestive system     This drawing shows the organs in the body that process food. Together, these organs are called the \"digestive system\" or \"digestive tract.\" As food travels through this system, the body absorbs nutrients and water.  Graphic 34385 Version 5.0  Consumer Information Use and Disclaimer   Disclaimer: This generalized information is a limited summary of diagnosis, treatment, and/or medication information. It is not meant to be comprehensive and should be used as a tool to help the user understand and/or assess potential diagnostic and treatment options. It does NOT include all information about conditions, treatments, medications, side effects, or risks that may apply to a specific patient. It is not intended to be medical advice or a substitute for the medical advice, diagnosis, or treatment of a health care provider based on the " health care provider's examination and assessment of a patient's specific and unique circumstances. Patients must speak with a health care provider for complete information about their health, medical questions, and treatment options, including any risks or benefits regarding use of medications. This information does not endorse any treatments or medications as safe, effective, or approved for treating a specific patient. UpToDate, Inc. and its affiliates disclaim any warranty or liability relating to this information or the use thereof.The use of this information is governed by the Terms of Use, available at https://www.[facility].com/en/know/clinical-effectiveness-terms. 2024© UpToDate, Inc. and its affiliates and/or licensors. All rights reserved.  Copyright   © 2024 UpToDate, Inc. and/or its affiliates. All rights reserved.

## 2024-12-17 ENCOUNTER — APPOINTMENT (OUTPATIENT)
Dept: RADIOLOGY | Facility: CLINIC | Age: 29
End: 2024-12-17
Payer: MEDICARE

## 2024-12-17 ENCOUNTER — OFFICE VISIT (OUTPATIENT)
Dept: URGENT CARE | Facility: CLINIC | Age: 29
End: 2024-12-17
Payer: MEDICARE

## 2024-12-17 VITALS
TEMPERATURE: 97.5 F | SYSTOLIC BLOOD PRESSURE: 92 MMHG | BODY MASS INDEX: 20.66 KG/M2 | OXYGEN SATURATION: 99 % | DIASTOLIC BLOOD PRESSURE: 62 MMHG | WEIGHT: 128 LBS | HEART RATE: 78 BPM | RESPIRATION RATE: 18 BRPM

## 2024-12-17 DIAGNOSIS — R68.84 JAW PAIN: Primary | ICD-10-CM

## 2024-12-17 DIAGNOSIS — H66.92 LEFT OTITIS MEDIA, UNSPECIFIED OTITIS MEDIA TYPE: ICD-10-CM

## 2024-12-17 DIAGNOSIS — M26.629 TMJ SYNDROME: ICD-10-CM

## 2024-12-17 DIAGNOSIS — K02.9 PAIN DUE TO DENTAL CARIES: ICD-10-CM

## 2024-12-17 DIAGNOSIS — R68.84 JAW PAIN: ICD-10-CM

## 2024-12-17 PROCEDURE — 70330 X-RAY EXAM OF JAW JOINTS: CPT

## 2024-12-17 PROCEDURE — 99213 OFFICE O/P EST LOW 20 MIN: CPT | Performed by: EMERGENCY MEDICINE

## 2024-12-17 PROCEDURE — G0463 HOSPITAL OUTPT CLINIC VISIT: HCPCS | Performed by: EMERGENCY MEDICINE

## 2024-12-17 RX ORDER — IBUPROFEN 600 MG/1
TABLET, FILM COATED ORAL
Qty: 21 TABLET | Refills: 0 | Status: SHIPPED | OUTPATIENT
Start: 2024-12-17

## 2024-12-17 RX ORDER — CLINDAMYCIN HYDROCHLORIDE 300 MG/1
300 CAPSULE ORAL 4 TIMES DAILY
Qty: 28 CAPSULE | Refills: 0 | Status: SHIPPED | OUTPATIENT
Start: 2024-12-17 | End: 2024-12-24

## 2024-12-17 NOTE — PATIENT INSTRUCTIONS
Take Motrin with food every 6 hours for pain  F/u with Dentist as soon as possible  Proceed to the ER if symptoms get worse  Our radiologist will re-read the X-Rays, if there is anything different, we will call you.  Take Antibiotic until gone
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
## 2025-01-06 ENCOUNTER — TELEMEDICINE (OUTPATIENT)
Dept: OTHER | Facility: HOSPITAL | Age: 30
End: 2025-01-06
Payer: MEDICARE

## 2025-01-06 DIAGNOSIS — J32.9 SINUSITIS, UNSPECIFIED CHRONICITY, UNSPECIFIED LOCATION: Primary | ICD-10-CM

## 2025-01-06 PROCEDURE — 99212 OFFICE O/P EST SF 10 MIN: CPT | Performed by: NURSE PRACTITIONER

## 2025-01-06 RX ORDER — FLUTICASONE PROPIONATE 50 MCG
1 SPRAY, SUSPENSION (ML) NASAL DAILY
Qty: 16 G | Refills: 0 | Status: SHIPPED | OUTPATIENT
Start: 2025-01-06 | End: 2025-01-13

## 2025-01-06 NOTE — PATIENT INSTRUCTIONS
As we discussed your illness is viral.  No antibiotics are indicated at this time.  Rest and drink extra fluids.  OTC cough and cold medications as needed.  Tylenol or Motrin as needed for pain or fever.  Salt water gargles and throat lozenges for sore throat.  Follow up with PCP if no improvement.  Go to ER with worsening symptoms.      Cold Symptoms   WHAT YOU NEED TO KNOW:   A cold is an infection caused by a virus. The infection causes your upper respiratory system to become inflamed. Common symptoms of a cold include sneezing, dry throat, a stuffy nose, headache, watery eyes, and a cough. Your cough may be dry, or you may cough up mucus. You may also have muscle aches, joint pain, and tiredness. Rarely, you may have a fever. Most colds go away without treatment.  DISCHARGE INSTRUCTIONS:   Return to the emergency department if:   You have increased tiredness and weakness.    You are unable to eat.     Your heart is beating much faster than usual for you.     You see white spots in the back of your throat and your neck is swollen and sore to the touch.     You see pinpoint or larger reddish-purple dots on your skin.  Contact your healthcare provider if:   You have a fever higher than 102°F (38.9°C).    You have new or worsening shortness of breath.     You have thick nasal drainage for more than 2 days.     Your symptoms do not improve or get worse within 5 days.     You have questions or concerns about your condition or care.  Medicines:  The following medicines may be suggested by your healthcare provider to decrease your cold symptoms. These medicines are available without a doctor's order. Ask which medicines to take and when to take them. Follow directions.  NSAIDs or acetaminophen  help to bring down a fever or decrease pain.    Decongestants  help decrease nasal stuffiness.     Antihistamines  help decrease sneezing and a runny nose.     Cough suppressants  help decrease how much you cough.    Expectorants   help loosen mucus so you can cough it up.    Take your medicine as directed.  Contact your healthcare provider if you think your medicine is not helping or if you have side effects. Tell him of her if you are allergic to any medicine. Keep a list of the medicines, vitamins, and herbs you take. Include the amounts, and when and why you take them. Bring the list or the pill bottles to follow-up visits. Carry your medicine list with you in case of an emergency.  Symptom relief:  The following may help relieve cold symptoms, such as a dry throat and congestion:  Gargle with mouthwash or warm salt water as directed.     Suck on throat lozenges or hard candy.     Use a cold or warm vaporizer or humidifier to ease your breathing.     Rest for at least 2 days and then as needed to decrease tiredness and weakness.     Use petroleum based jelly around your nostrils to decrease irritation from blowing your nose.  Drink liquids:  Liquids will help thin and loosen thick mucus so you can cough it up. Liquids will also keep you hydrated. Ask your healthcare provider which liquids are best for you and how much to drink each day.  Prevent the spread of germs:  You can spread your cold germs to others for at least 3 days after your symptoms start. Wash your hands often. Do not share items, such as eating utensils. Cover your nose and mouth when you cough or sneeze using the crook of your elbow instead of your hands. Throw used tissues in the garbage.  Do not smoke:  Smoking may worsen your symptoms and increase the length of time you feel sick. Talk with your healthcare provider if you need help to stop smoking.  Follow up with your healthcare provider as directed:  Write down your questions so you remember to ask them during your visits.   © 2017 [employer] Information is for End User's use only and may not be sold, redistributed or otherwise used for commercial purposes. All illustrations and images included in  CareNotes® are the copyrighted property of [employer], [employer]. or [employer].  The above information is an  only. It is not intended as medical advice for individual conditions or treatments. Talk to your doctor, nurse or pharmacist before following any medical regimen to see if it is safe and effective for you.    General Sunscreen Counseling: I recommended a broad spectrum sunscreen with a SPF of 30 or higher.  I explained that SPF 30 sunscreens block approximately 97 percent of the sun's harmful rays.  Sunscreens should be applied at least 15 minutes prior to expected sun exposure and then every 2 hours after that as long as sun exposure continues. If swimming or exercising sunscreen should be reapplied every 45 minutes to an hour after getting wet or sweating.  One ounce, or the equivalent of a shot glass full of sunscreen, is adequate to protect the skin not covered by a bathing suit. I also recommended a lip balm with a sunscreen as well. Sun protective clothing can be used in lieu of sunscreen but must be worn the entire time you are exposed to the sun's rays. Detail Level: Detailed

## 2025-01-06 NOTE — PROGRESS NOTES
Virtual Regular Visit  Name: Renan Carpenter      : 1995      MRN: [id number]  Encounter Provider: Your Video Visit Provider  Encounter Date: 2025   Encounter department: VIRTUAL CARE       Verification of patient location:  Patient is located at Home in the following state in which I hold an active license [place] :  Assessment & Plan  Sinusitis, unspecified chronicity, unspecified location    Orders:    fluticasone (FLONASE) 50 mcg/act nasal spray; 1 spray into each nostril daily for 7 days        Encounter provider Your Video Visit Provider    The patient was identified by name and date of birth. Renan Carpenter was informed that this is a telemedicine visit and that the visit is being conducted through the Epic Embedded platform. He agrees to proceed..  My office door was closed. No one else was in the room.  He acknowledged consent and understanding of privacy and security of the video platform. The patient has agreed to participate and understands they can discontinue the visit at any time.    Patient is aware this is a billable service.     History was obtained from: History obtained from: patient  History of Present Illness     This is a 29 year old male here today for video visit.  He states he is having sinus pressure and congestion.  He states the symptoms started 2  days ago.   He denies any fevers.  He did not test for COVID.   He states he does not have a cough.  No chest pain or sob.        Review of Systems   Constitutional:  Negative for activity change, chills, fatigue and fever.   HENT:  Positive for congestion, postnasal drip, rhinorrhea, sinus pressure and sinus pain.    Respiratory:  Negative for cough.    Cardiovascular: Negative.    Neurological: Negative.    Psychiatric/Behavioral: Negative.         Objective   There were no vitals taken for this visit.    Physical Exam  Constitutional:       General: He is not in acute distress.     Appearance: Normal appearance. He is not  ill-appearing or toxic-appearing.   HENT:      Head: Normocephalic and atraumatic.   Pulmonary:      Effort: Pulmonary effort is normal. No respiratory distress.   Abdominal:      Tenderness: There is no abdominal tenderness.   Neurological:      Mental Status: He is alert and oriented to person, place, and time.   Psychiatric:         Mood and Affect: Mood normal.         Behavior: Behavior normal.         Thought Content: Thought content normal.         Judgment: Judgment normal.         Visit Time  Total Visit Duration: 5 minutes not including the time spent for establishing the audio/video connection.

## 2025-02-12 ENCOUNTER — APPOINTMENT (OUTPATIENT)
Dept: URGENT CARE | Facility: CLINIC | Age: 30
End: 2025-02-12

## 2025-02-17 ENCOUNTER — TELEMEDICINE (OUTPATIENT)
Dept: OTHER | Facility: HOSPITAL | Age: 30
End: 2025-02-17
Payer: MEDICARE

## 2025-02-17 VITALS — HEART RATE: 110 BPM | OXYGEN SATURATION: 99 %

## 2025-02-17 DIAGNOSIS — J06.9 UPPER RESPIRATORY TRACT INFECTION, UNSPECIFIED TYPE: Primary | ICD-10-CM

## 2025-02-17 PROCEDURE — 99213 OFFICE O/P EST LOW 20 MIN: CPT | Performed by: NURSE PRACTITIONER

## 2025-02-17 RX ORDER — FLUTICASONE PROPIONATE 50 MCG
1 SPRAY, SUSPENSION (ML) NASAL DAILY
Qty: 16 G | Refills: 0 | Status: SHIPPED | OUTPATIENT
Start: 2025-02-17

## 2025-02-17 RX ORDER — GUAIFENESIN, PSEUDOEPHEDRINE HYDROCHLORIDE 600; 60 MG/1; MG/1
1 TABLET, EXTENDED RELEASE ORAL EVERY 12 HOURS
Qty: 14 TABLET | Refills: 0 | Status: SHIPPED | OUTPATIENT
Start: 2025-02-17 | End: 2025-02-24

## 2025-02-17 NOTE — PATIENT INSTRUCTIONS
As we discussed your illness is viral.  No antibiotics are indicated at this time.  Rest and drink extra fluids.  OTC cough and cold medications as needed.  Tylenol or Motrin as needed for pain or fever.  Salt water gargles and throat lozenges for sore throat.  Follow up with PCP if no improvement.  Go to ER with worsening symptoms.      Cold Symptoms   WHAT YOU NEED TO KNOW:   A cold is an infection caused by a virus. The infection causes your upper respiratory system to become inflamed. Common symptoms of a cold include sneezing, dry throat, a stuffy nose, headache, watery eyes, and a cough. Your cough may be dry, or you may cough up mucus. You may also have muscle aches, joint pain, and tiredness. Rarely, you may have a fever. Most colds go away without treatment.  DISCHARGE INSTRUCTIONS:   Return to the emergency department if:   You have increased tiredness and weakness.    You are unable to eat.     Your heart is beating much faster than usual for you.     You see white spots in the back of your throat and your neck is swollen and sore to the touch.     You see pinpoint or larger reddish-purple dots on your skin.  Contact your healthcare provider if:   You have a fever higher than 102°F (38.9°C).    You have new or worsening shortness of breath.     You have thick nasal drainage for more than 2 days.     Your symptoms do not improve or get worse within 5 days.     You have questions or concerns about your condition or care.  Medicines:  The following medicines may be suggested by your healthcare provider to decrease your cold symptoms. These medicines are available without a doctor's order. Ask which medicines to take and when to take them. Follow directions.  NSAIDs or acetaminophen  help to bring down a fever or decrease pain.    Decongestants  help decrease nasal stuffiness.     Antihistamines  help decrease sneezing and a runny nose.     Cough suppressants  help decrease how much you cough.    Expectorants   help loosen mucus so you can cough it up.    Take your medicine as directed.  Contact your healthcare provider if you think your medicine is not helping or if you have side effects. Tell him of her if you are allergic to any medicine. Keep a list of the medicines, vitamins, and herbs you take. Include the amounts, and when and why you take them. Bring the list or the pill bottles to follow-up visits. Carry your medicine list with you in case of an emergency.  Symptom relief:  The following may help relieve cold symptoms, such as a dry throat and congestion:  Gargle with mouthwash or warm salt water as directed.     Suck on throat lozenges or hard candy.     Use a cold or warm vaporizer or humidifier to ease your breathing.     Rest for at least 2 days and then as needed to decrease tiredness and weakness.     Use petroleum based jelly around your nostrils to decrease irritation from blowing your nose.  Drink liquids:  Liquids will help thin and loosen thick mucus so you can cough it up. Liquids will also keep you hydrated. Ask your healthcare provider which liquids are best for you and how much to drink each day.  Prevent the spread of germs:  You can spread your cold germs to others for at least 3 days after your symptoms start. Wash your hands often. Do not share items, such as eating utensils. Cover your nose and mouth when you cough or sneeze using the crook of your elbow instead of your hands. Throw used tissues in the garbage.  Do not smoke:  Smoking may worsen your symptoms and increase the length of time you feel sick. Talk with your healthcare provider if you need help to stop smoking.  Follow up with your healthcare provider as directed:  Write down your questions so you remember to ask them during your visits.   © 2017 [employer] Information is for End User's use only and may not be sold, redistributed or otherwise used for commercial purposes. All illustrations and images included in  CareNotes® are the copyrighted property of [employer], [employer]. or [employer].  The above information is an  only. It is not intended as medical advice for individual conditions or treatments. Talk to your doctor, nurse or pharmacist before following any medical regimen to see if it is safe and effective for you.

## 2025-02-17 NOTE — PROGRESS NOTES
Virtual Regular Visit  Name: Renan Crapenter      : 1995      MRN: [id number]  Encounter Provider: SOWMYA Clarke  Encounter Date: 2025   Encounter department: VIRTUAL CARE       Verification of patient location:  Patient is located at Home in the following state in which I hold an active license [place] :  Assessment & Plan  Upper respiratory tract infection, unspecified type    Orders:    fluticasone (FLONASE) 50 mcg/act nasal spray; 1 spray into each nostril daily    pseudoephedrine-guaifenesin (MUCINEX D)  MG per tablet; Take 1 tablet by mouth every 12 (twelve) hours for 7 days        Encounter provider SOWMYA Clarke    The patient was identified by name and date of birth. Renan Carpenter was informed that this is a telemedicine visit and that the visit is being conducted through the Epic Embedded platform. He agrees to proceed..  My office door was closed. No one else was in the room.  He acknowledged consent and understanding of privacy and security of the video platform. The patient has agreed to participate and understands they can discontinue the visit at any time.    Patient is aware this is a billable service.     History obtained from: patient  History of Present Illness     This is a 29 year old male here today for video visit.  He states he started with sinus congestion 3 days ago.  He states he is not having any fevers.  He states he does have slight cough.  He states he does have some throat irritation which may be from post nasal drip.  He did not test for Covid or flu.   He is not vaccinated for Covid or flu.  Some green nasal discharge in the AM      Review of Systems   Constitutional:  Negative for activity change, chills, fatigue and fever.   HENT:  Positive for congestion, rhinorrhea, sinus pressure and sinus pain.    Respiratory:  Positive for cough.    Cardiovascular: Negative.    Gastrointestinal: Negative.    Neurological: Negative.    Psychiatric/Behavioral:  Negative.         Objective   Pulse (!) 110   SpO2 99%     Physical Exam  Constitutional:       General: He is not in acute distress.     Appearance: Normal appearance. He is not ill-appearing or toxic-appearing.   HENT:      Head: Normocephalic and atraumatic.      Nose: Congestion present.   Pulmonary:      Effort: Pulmonary effort is normal. No respiratory distress.   Neurological:      Mental Status: He is alert and oriented to person, place, and time.   Psychiatric:         Mood and Affect: Mood normal.         Behavior: Behavior normal.         Thought Content: Thought content normal.         Judgment: Judgment normal.         Visit Time  Total Visit Duration: 5 minutes not including the time spent for establishing the audio/video connection.

## 2025-02-25 ENCOUNTER — OFFICE VISIT (OUTPATIENT)
Dept: URGENT CARE | Facility: CLINIC | Age: 30
End: 2025-02-25
Payer: MEDICARE

## 2025-02-25 VITALS
SYSTOLIC BLOOD PRESSURE: 106 MMHG | RESPIRATION RATE: 18 BRPM | TEMPERATURE: 97.3 F | BODY MASS INDEX: 20.66 KG/M2 | HEART RATE: 60 BPM | DIASTOLIC BLOOD PRESSURE: 68 MMHG | WEIGHT: 128 LBS | OXYGEN SATURATION: 98 %

## 2025-02-25 DIAGNOSIS — J01.00 ACUTE MAXILLARY SINUSITIS, RECURRENCE NOT SPECIFIED: Primary | ICD-10-CM

## 2025-02-25 PROCEDURE — G0463 HOSPITAL OUTPT CLINIC VISIT: HCPCS | Performed by: PHYSICIAN ASSISTANT

## 2025-02-25 PROCEDURE — 99213 OFFICE O/P EST LOW 20 MIN: CPT | Performed by: PHYSICIAN ASSISTANT

## 2025-02-25 RX ORDER — AZITHROMYCIN 250 MG/1
TABLET, FILM COATED ORAL
Qty: 6 TABLET | Refills: 0 | Status: SHIPPED | OUTPATIENT
Start: 2025-02-25 | End: 2025-03-01

## 2025-02-25 RX ORDER — PREDNISONE 20 MG/1
20 TABLET ORAL DAILY
Qty: 5 TABLET | Refills: 0 | Status: SHIPPED | OUTPATIENT
Start: 2025-02-25 | End: 2025-03-02

## 2025-02-25 NOTE — PATIENT INSTRUCTIONS
Recommended oral antibiotics and oral steroid.   Take over the counter medications as needed for symptomatic management.         Follow up with PCP in 3-5 days.  Proceed to  ER if symptoms worsen.    If tests are performed, our office will contact you with results only if changes need to made to the care plan discussed with you at the visit. You can review your full results on [facility].

## 2025-02-25 NOTE — LETTER
February 25, 2025     Patient: Renan Carpenter   YOB: 1995   Date of Visit: 2/25/2025       To Whom it May Concern:    Renan Carpenter was seen in my clinic on 2/25/2025. He may return to work on 2/27/2025 .    If you have any questions or concerns, please don't hesitate to call.         Sincerely,          Skye Marie PA-C        CC: No Recipients

## 2025-02-25 NOTE — PROGRESS NOTES
[facility]        NAME: Renan Carpenter is a 29 y.o. male  : 1995    MRN: [id number]  DATE: 2025  TIME: 8:58 AM    Assessment and Plan   Acute maxillary sinusitis, recurrence not specified [J01.00]  1. Acute maxillary sinusitis, recurrence not specified  azithromycin (ZITHROMAX) 250 mg tablet    predniSONE 20 mg tablet            Patient Instructions     Patient Instructions   Recommended oral antibiotics and oral steroid.   Take over the counter medications as needed for symptomatic management.         Follow up with PCP in 3-5 days.  Proceed to  ER if symptoms worsen.    If tests are performed, our office will contact you with results only if changes need to made to the care plan discussed with you at the visit. You can review your full results on [facility].        Chief Complaint     Chief Complaint   Patient presents with   • Cold Like Symptoms     Pt here for sinus congestion for the past 10 days          History of Present Illness       Sinusitis  This is a new problem. Episode onset: 10 days ago. The problem is unchanged. There has been no fever. The pain is moderate. Associated symptoms include congestion and sinus pressure. Pertinent negatives include no chills. Past treatments include nasal decongestants.       Review of Systems   Review of Systems   Constitutional:  Negative for chills, fatigue and fever.   HENT:  Positive for congestion and sinus pressure.    All other systems reviewed and are negative.        Current Medications       Current Outpatient Medications:   •  azithromycin (ZITHROMAX) 250 mg tablet, Take 2 tablets today then 1 tablet daily x 4 days, Disp: 6 tablet, Rfl: 0  •  predniSONE 20 mg tablet, Take 1 tablet (20 mg total) by mouth daily for 5 days, Disp: 5 tablet, Rfl: 0  •  busPIRone (BUSPAR) 5 mg tablet, , Disp: , Rfl:   •  cyclobenzaprine (FLEXERIL) 10 mg tablet, Take 1 tablet (10 mg total) by mouth 2 (two) times a day as needed for muscle  spasms (Patient not taking: Reported on 12/9/2023), Disp: 21 tablet, Rfl: 0  •  escitalopram (LEXAPRO) 10 mg tablet, Take 10 mg by mouth daily (Patient not taking: Reported on 6/6/2024), Disp: , Rfl:   •  fluticasone (FLONASE) 50 mcg/act nasal spray, 1 spray into each nostril daily for 7 days, Disp: 16 g, Rfl: 0  •  fluticasone (FLONASE) 50 mcg/act nasal spray, 1 spray into each nostril daily (Patient not taking: Reported on 2/25/2025), Disp: 16 g, Rfl: 0  •  ibuprofen (MOTRIN) 600 mg tablet, Take one tablet every 6 hours with food for pain x 1 week if needed (Patient not taking: Reported on 2/25/2025), Disp: 21 tablet, Rfl: 0  •  ondansetron (ZOFRAN) 4 mg tablet, Take 1 tablet (4 mg total) by mouth every 8 (eight) hours as needed for nausea or vomiting for up to 2 days, Disp: 6 tablet, Rfl: 0  •  ondansetron (ZOFRAN-ODT) 4 mg disintegrating tablet, Take 1 tablet (4 mg total) by mouth every 8 (eight) hours as needed for nausea or vomiting for up to 3 days, Disp: 9 tablet, Rfl: 0    Current Allergies     Allergies as of 02/25/2025 - Reviewed 02/25/2025   Allergen Reaction Noted   • Amoxicillin Hives 02/19/2020            The following portions of the patient's history were reviewed and updated as appropriate: allergies, current medications, past family history, past medical history, past social history, past surgical history and problem list.     Past Medical History:   Diagnosis Date   • Anxiety        Past Surgical History:   Procedure Laterality Date   • NO PAST SURGERIES         History reviewed. No pertinent family history.      Medications have been verified.        Objective   /68   Pulse 60   Temp (!) 97.3 °F (36.3 °C) (Tympanic)   Resp 18   Wt 58.1 kg (128 lb)   SpO2 98%   BMI 20.66 kg/m²        Physical Exam     Physical Exam  Vitals and nursing note reviewed.   Constitutional:       Appearance: Normal appearance.   HENT:      Right Ear: Tympanic membrane, ear canal and external ear normal.       Left Ear: Tympanic membrane, ear canal and external ear normal.      Nose:      Right Sinus: Maxillary sinus tenderness present.      Left Sinus: Maxillary sinus tenderness present.      Mouth/Throat:      Mouth: Mucous membranes are moist.   Cardiovascular:      Rate and Rhythm: Normal rate and regular rhythm.   Pulmonary:      Effort: Pulmonary effort is normal.      Breath sounds: Normal breath sounds.   Skin:     General: Skin is warm and dry.   Neurological:      General: No focal deficit present.      Mental Status: He is alert and oriented to person, place, and time.   Psychiatric:         Mood and Affect: Mood normal.         Behavior: Behavior normal.

## 2025-03-01 ENCOUNTER — TELEMEDICINE (OUTPATIENT)
Dept: OTHER | Facility: HOSPITAL | Age: 30
End: 2025-03-01
Payer: MEDICARE

## 2025-03-01 DIAGNOSIS — K52.9 GASTROENTERITIS: Primary | ICD-10-CM

## 2025-03-01 PROCEDURE — 99213 OFFICE O/P EST LOW 20 MIN: CPT | Performed by: PHYSICIAN ASSISTANT

## 2025-03-01 RX ORDER — ONDANSETRON 4 MG/1
4 TABLET, FILM COATED ORAL EVERY 8 HOURS PRN
Qty: 20 TABLET | Refills: 0 | Status: SHIPPED | OUTPATIENT
Start: 2025-03-01

## 2025-03-01 NOTE — LETTER
March 1, 2025     Patient: Renan Carpenter  YOB: 1995  Date of Visit: 3/1/2025      To Whom it May Concern:    Renan Carpenter is under my professional care. Renan was seen in my office on 3/1/2025. Please excuse him from work on 3/2 Renan may return to work on 3/3/25 .    If you have any questions or concerns, please don't hesitate to call.         Sincerely,          Stephanie Waller PA-C        CC: No Recipients

## 2025-03-02 NOTE — PROGRESS NOTES
Virtual Regular VisitName: Renan Carpenter      : 1995      MRN: [id number]  Encounter Provider: Stephanie Waller PA-C  Encounter Date: 3/1/2025   Encounter department: VIRTUAL CARE   :  Assessment & Plan  Gastroenteritis    Orders:    ondansetron (ZOFRAN) 4 mg tablet; Take 1 tablet (4 mg total) by mouth every 8 (eight) hours as needed for nausea or vomiting        History of Present Illness     Patient states that he has been having diarrhea since yesterday and now he feel nauseous and gassy.  He took some meds on an empty stomach. He is having stomach cramps and yellowish diarrhea. He denies any fevers. He states he had a sinus infection but that is resolving.  He denies any blood.        Review of Systems   Constitutional: Negative.    HENT: Negative.     Respiratory: Negative.     Cardiovascular: Negative.    Gastrointestinal:  Positive for abdominal pain, diarrhea and nausea. Negative for blood in stool and vomiting.   Musculoskeletal: Negative.    Neurological: Negative.    Psychiatric/Behavioral: Negative.         Objective   There were no vitals taken for this visit.    Physical Exam  Constitutional:       General: He is not in acute distress.     Appearance: Normal appearance. He is not ill-appearing, toxic-appearing or diaphoretic.   HENT:      Head: Normocephalic and atraumatic.   Pulmonary:      Effort: Pulmonary effort is normal.   Abdominal:      General: Abdomen is flat.      Tenderness: There is no abdominal tenderness. There is no guarding or rebound.   Skin:     General: Skin is dry.   Neurological:      General: No focal deficit present.      Mental Status: He is alert and oriented to person, place, and time.   Psychiatric:         Mood and Affect: Mood normal.         Behavior: Behavior normal.         Administrative Statements   Encounter provider Stephanie Waller PA-C    The Patient is located at Home and in the following state in which I hold an active license [place].    The patient was  identified by name and date of birth. Renan Carpenter was informed that this is a telemedicine visit and that the visit is being conducted through the Epic Embedded platform. He agrees to proceed..  My office door was closed. No one else was in the room.  He acknowledged consent and understanding of privacy and security of the video platform. The patient has agreed to participate and understands they can discontinue the visit at any time.    I have spent a total time of 5 minutes in caring for this patient on the day of the visit/encounter including Documenting in the medical record, Reviewing/placing orders in the medical record (including tests, medications, and/or procedures), and Obtaining or reviewing history  .

## 2025-05-08 ENCOUNTER — TELEMEDICINE (OUTPATIENT)
Dept: OTHER | Facility: HOSPITAL | Age: 30
End: 2025-05-08
Payer: MEDICARE

## 2025-05-08 DIAGNOSIS — J02.9 SORE THROAT: Primary | ICD-10-CM

## 2025-05-08 PROCEDURE — 99213 OFFICE O/P EST LOW 20 MIN: CPT | Performed by: NURSE PRACTITIONER

## 2025-05-08 NOTE — LETTER
May 8, 2025     Patient: Renan Carpenter   YOB: 1995   Date of Visit: 5/8/2025       To Whom it May Concern:    Renan Carpenter is under my professional care. He was seen at the above location on 5/8/2025. He may return to work on 05/12, please excuse 05/07-05/09 .    If you have any questions or concerns, please don't hesitate to call.         Sincerely,          SOWMYA Clarke        CC: No Recipients

## 2025-05-08 NOTE — PATIENT INSTRUCTIONS
As we discussed your illness is viral.  No antibiotics are indicated at this time.  Get strep done we can treat if needed.  Rest and drink extra fluids.  OTC cough and cold medications as needed.  Tylenol or Motrin as needed for pain or fever.  Salt water gargles and throat lozenges for sore throat.  Follow up with PCP if no improvement.  Go to ER with worsening symptoms.      Cold Symptoms   WHAT YOU NEED TO KNOW:   A cold is an infection caused by a virus. The infection causes your upper respiratory system to become inflamed. Common symptoms of a cold include sneezing, dry throat, a stuffy nose, headache, watery eyes, and a cough. Your cough may be dry, or you may cough up mucus. You may also have muscle aches, joint pain, and tiredness. Rarely, you may have a fever. Most colds go away without treatment.  DISCHARGE INSTRUCTIONS:   Return to the emergency department if:   You have increased tiredness and weakness.    You are unable to eat.     Your heart is beating much faster than usual for you.     You see white spots in the back of your throat and your neck is swollen and sore to the touch.     You see pinpoint or larger reddish-purple dots on your skin.  Contact your healthcare provider if:   You have a fever higher than 102°F (38.9°C).    You have new or worsening shortness of breath.     You have thick nasal drainage for more than 2 days.     Your symptoms do not improve or get worse within 5 days.     You have questions or concerns about your condition or care.  Medicines:  The following medicines may be suggested by your healthcare provider to decrease your cold symptoms. These medicines are available without a doctor's order. Ask which medicines to take and when to take them. Follow directions.  NSAIDs or acetaminophen  help to bring down a fever or decrease pain.    Decongestants  help decrease nasal stuffiness.     Antihistamines  help decrease sneezing and a runny nose.     Cough suppressants  help  decrease how much you cough.    Expectorants  help loosen mucus so you can cough it up.    Take your medicine as directed.  Contact your healthcare provider if you think your medicine is not helping or if you have side effects. Tell him of her if you are allergic to any medicine. Keep a list of the medicines, vitamins, and herbs you take. Include the amounts, and when and why you take them. Bring the list or the pill bottles to follow-up visits. Carry your medicine list with you in case of an emergency.  Symptom relief:  The following may help relieve cold symptoms, such as a dry throat and congestion:  Gargle with mouthwash or warm salt water as directed.     Suck on throat lozenges or hard candy.     Use a cold or warm vaporizer or humidifier to ease your breathing.     Rest for at least 2 days and then as needed to decrease tiredness and weakness.     Use petroleum based jelly around your nostrils to decrease irritation from blowing your nose.  Drink liquids:  Liquids will help thin and loosen thick mucus so you can cough it up. Liquids will also keep you hydrated. Ask your healthcare provider which liquids are best for you and how much to drink each day.  Prevent the spread of germs:  You can spread your cold germs to others for at least 3 days after your symptoms start. Wash your hands often. Do not share items, such as eating utensils. Cover your nose and mouth when you cough or sneeze using the crook of your elbow instead of your hands. Throw used tissues in the garbage.  Do not smoke:  Smoking may worsen your symptoms and increase the length of time you feel sick. Talk with your healthcare provider if you need help to stop smoking.  Follow up with your healthcare provider as directed:  Write down your questions so you remember to ask them during your visits.   © 2017 "[employer] Information is for End User's use only and may not be sold, redistributed or otherwise used for commercial  purposes. All illustrations and images included in CareNotes® are the copyrighted property of [employer], [employer] or [employer].  The above information is an  only. It is not intended as medical advice for individual conditions or treatments. Talk to your doctor, nurse or pharmacist before following any medical regimen to see if it is safe and effective for you.

## 2025-05-08 NOTE — PROGRESS NOTES
Virtual Regular Visit  Name: Renan Carpenter      : 1995      MRN: [id number]  Encounter Provider: SOWMYA Clarke  Encounter Date: 2025   Encounter department: VIRTUAL CARE       Verification of patient location:  Patient is located at Home in the following state in which I hold an active license [place] :  Assessment & Plan  Sore throat    Orders:    Strep A PCR; Future        Encounter provider SOWMYA Clarke    The patient was identified by name and date of birth. Renan Carpenter was informed that this is a telemedicine visit and that the visit is being conducted through the Epic Embedded platform. He agrees to proceed..  My office door was closed. No one else was in the room. He acknowledged consent and understanding of privacy and security of the video platform. The patient has agreed to participate and understands they can discontinue the visit at any time.    Patient is aware this is a billable service.     History obtained from: patient  History of Present Illness     This is a 29 year old male here today for video visit.  He states he has sore throat and nasal congestion.  He states he is having some sinus pressure.  Symptoms started yesterday.  He states he is not having any fever but more fatigue.  He does have a slight cough.  He has not tried anything OTC.  He states he is prone to strep.      Review of Systems   Constitutional:  Negative for activity change, chills, fatigue and fever.   HENT:  Positive for congestion and sore throat.    Respiratory:  Negative for cough.    Cardiovascular: Negative.    Neurological: Negative.    Psychiatric/Behavioral: Negative.         Objective   There were no vitals taken for this visit.    Physical Exam  Constitutional:       General: He is not in acute distress.     Appearance: Normal appearance. He is not ill-appearing or toxic-appearing.   HENT:      Head: Normocephalic and atraumatic.      Mouth/Throat:      Pharynx: Posterior oropharyngeal  erythema present.   Pulmonary:      Effort: Pulmonary effort is normal. No respiratory distress.   Neurological:      Mental Status: He is alert and oriented to person, place, and time.   Psychiatric:         Mood and Affect: Mood normal.         Behavior: Behavior normal.         Thought Content: Thought content normal.         Judgment: Judgment normal.         Visit Time  Total Visit Duration: 5 minutes not including the time spent for establishing the audio/video connection.

## 2025-05-15 ENCOUNTER — APPOINTMENT (OUTPATIENT)
Dept: URGENT CARE | Facility: CLINIC | Age: 30
End: 2025-05-15
Payer: MEDICARE

## 2025-05-15 ENCOUNTER — OFFICE VISIT (OUTPATIENT)
Dept: URGENT CARE | Facility: CLINIC | Age: 30
End: 2025-05-15
Payer: MEDICARE

## 2025-05-15 VITALS
RESPIRATION RATE: 18 BRPM | BODY MASS INDEX: 20.66 KG/M2 | WEIGHT: 128 LBS | HEART RATE: 58 BPM | TEMPERATURE: 97.8 F | OXYGEN SATURATION: 98 % | SYSTOLIC BLOOD PRESSURE: 90 MMHG | DIASTOLIC BLOOD PRESSURE: 60 MMHG

## 2025-05-15 DIAGNOSIS — K04.7 DENTAL INFECTION: Primary | ICD-10-CM

## 2025-05-15 PROCEDURE — G0463 HOSPITAL OUTPT CLINIC VISIT: HCPCS | Performed by: PHYSICIAN ASSISTANT

## 2025-05-15 PROCEDURE — 99214 OFFICE O/P EST MOD 30 MIN: CPT | Performed by: PHYSICIAN ASSISTANT

## 2025-05-15 RX ORDER — CLINDAMYCIN HYDROCHLORIDE 300 MG/1
300 CAPSULE ORAL 3 TIMES DAILY
Qty: 21 CAPSULE | Refills: 0 | Status: SHIPPED | OUTPATIENT
Start: 2025-05-15 | End: 2025-05-22

## 2025-05-15 NOTE — PROGRESS NOTES
Patient Name: Renan Carpenter      : 1995      MRN: [id number]  Encounter Provider: Latanya Langford PA-C  Encounter Date: May 15, 2025  Encounter department: [facility]    Assessment & Plan  Dental infection    Orders:    clindamycin (CLEOCIN) 300 MG capsule; Take 1 capsule (300 mg total) by mouth 3 (three) times a day for 7 days    Ambulatory Referral to Oral Maxillofacial Surgery; Future  - Rx antibiotics due to likely infection   - Has appt with S in August, so gave ASAP referral to see if he can get in sooner.     Patient Instructions:   There are no Patient Instructions on file for this visit.    Subjective   Chief Complaint   Patient presents with    Dental Pain     Pt here for dental pain on bottom right side of mouth that started 1 week ago and has used suction and blood out and when eating feels like someone is flickering teeth         Renan Carpenter is a 29 y.o.male  Patient presents with right bottom dental pain x 1 week. He has a broken tooth in that area. He has been taking ibuprofen OTC with mild relief. He has an appointment with [facility] in August, but his regular dentist just closed his office.         Review of Systems   Constitutional:  Negative for chills, fatigue and fever.   HENT:  Positive for dental problem. Negative for congestion, ear pain, postnasal drip, rhinorrhea, sinus pressure, sinus pain, sneezing and sore throat.    Eyes:  Negative for pain and visual disturbance.   Respiratory:  Negative for cough and shortness of breath.    Cardiovascular:  Negative for chest pain and palpitations.   Gastrointestinal:  Negative for abdominal pain, diarrhea, nausea and vomiting.   Genitourinary:  Negative for dysuria and hematuria.   Musculoskeletal:  Negative for arthralgias, back pain and myalgias.   Skin:  Negative for rash.   Neurological:  Negative for dizziness, seizures, syncope, numbness and headaches.   All other systems reviewed and are  negative.      Current Medications[1]  Allergies as of 05/15/2025 - Reviewed 05/15/2025   Allergen Reaction Noted    Amoxicillin Hives 02/19/2020     Past Medical History:   Diagnosis Date    Anxiety      Past Surgical History:   Procedure Laterality Date    NO PAST SURGERIES       History reviewed. No pertinent family history.     Objective   BP 90/60   Pulse 58   Temp 97.8 °F (36.6 °C) (Tympanic)   Resp 18   Wt 58.1 kg (128 lb)   SpO2 98%   BMI 20.66 kg/m²      Physical Exam  Vitals and nursing note reviewed.   Constitutional:       Appearance: Normal appearance. He is normal weight.   HENT:      Head: Normocephalic and atraumatic.      Mouth/Throat:       Cardiovascular:      Rate and Rhythm: Normal rate.   Pulmonary:      Effort: Pulmonary effort is normal.     Skin:     General: Skin is warm and dry.     Neurological:      General: No focal deficit present.      Mental Status: He is alert and oriented to person, place, and time.     Psychiatric:         Mood and Affect: Mood normal.         Behavior: Behavior normal.            [1]   Current Outpatient Medications:     clindamycin (CLEOCIN) 300 MG capsule, Take 1 capsule (300 mg total) by mouth 3 (three) times a day for 7 days, Disp: 21 capsule, Rfl: 0    busPIRone (BUSPAR) 5 mg tablet, , Disp: , Rfl:     fluticasone (FLONASE) 50 mcg/act nasal spray, 1 spray into each nostril daily for 7 days, Disp: 16 g, Rfl: 0    fluticasone (FLONASE) 50 mcg/act nasal spray, 1 spray into each nostril daily (Patient not taking: Reported on 5/15/2025), Disp: 16 g, Rfl: 0

## 2025-06-24 ENCOUNTER — TELEMEDICINE (OUTPATIENT)
Dept: OTHER | Facility: HOSPITAL | Age: 30
End: 2025-06-24
Payer: MEDICARE

## 2025-06-24 DIAGNOSIS — J02.9 SORE THROAT: Primary | ICD-10-CM

## 2025-06-24 PROCEDURE — 99213 OFFICE O/P EST LOW 20 MIN: CPT | Performed by: NURSE PRACTITIONER

## 2025-06-24 NOTE — PROGRESS NOTES
Virtual Regular Visit  Name: Renan Carpenter      : 1995      MRN: [id number]  Encounter Provider: Your Video Visit Provider  Encounter Date: 2025   Encounter department: VIRTUAL CARE       Verification of patient location:  Patient is located at Home in the following state in which I [place] an active license [place] :  Assessment & Plan  Sore throat    Orders:    Strep A PCR; Future        Encounter provider Your Video Visit Provider    The patient was identified by name and date of birth. Renan Carpenter was informed that this is a telemedicine visit and that the visit is being conducted through the Epic Embedded platform. He agrees to proceed..  My office door was closed. No one else was in the room. He acknowledged consent and understanding of privacy and security of the video platform. The patient has agreed to participate and understands they can discontinue the visit at any time.    Patient is aware this is a billable service.     History obtained from: patient  History of Present Illness     This is a 29 year old male here today for video visit.  He states he has some swelling in this left lymp node.  He states he does have sore throat.  He denies any cough or congestion.  He does have some mucous taste.  No chest pain or sob.  He states he is eating and drinking okay.  He states he is currently taking doxycyline 100 mg BID for skin infection.       Review of Systems   Constitutional:  Negative for activity change, chills, fatigue and fever.   HENT:  Positive for sore throat.    Respiratory: Negative.     Cardiovascular: Negative.    Neurological: Negative.    Psychiatric/Behavioral: Negative.         Objective   There were no vitals taken for this visit.    Physical Exam  Constitutional:       General: He is not in acute distress.     Appearance: Normal appearance. He is not ill-appearing or toxic-appearing.   Pulmonary:      Effort: Pulmonary effort is normal. No respiratory distress.    Lymphadenopathy:      Head:      Right side of head: Tonsillar adenopathy present.      Comments: As per patient      Neurological:      Mental Status: He is alert and oriented to person, place, and time.     Psychiatric:         Mood and Affect: Mood normal.         Behavior: Behavior normal.         Thought Content: Thought content normal.         Judgment: Judgment normal.         Visit Time  Total Visit Duration: 4 minutes not including the time spent for establishing the audio/video connection.

## 2025-06-25 ENCOUNTER — HOSPITAL ENCOUNTER (EMERGENCY)
Facility: HOSPITAL | Age: 30
Discharge: HOME | End: 2025-06-25
Attending: EMERGENCY MEDICINE | Admitting: EMERGENCY MEDICINE
Payer: MEDICARE

## 2025-06-25 VITALS
RESPIRATION RATE: 19 BRPM | HEIGHT: 66 IN | TEMPERATURE: 98 F | OXYGEN SATURATION: 99 % | BODY MASS INDEX: 21.22 KG/M2 | WEIGHT: 132.06 LBS | HEART RATE: 61 BPM | SYSTOLIC BLOOD PRESSURE: 98 MMHG | DIASTOLIC BLOOD PRESSURE: 63 MMHG

## 2025-06-25 DIAGNOSIS — H92.01 RIGHT EAR PAIN: ICD-10-CM

## 2025-06-25 DIAGNOSIS — J02.9 SORE THROAT: Primary | ICD-10-CM

## 2025-06-25 PROCEDURE — 99284 EMERGENCY DEPT VISIT MOD MDM: CPT

## 2025-06-25 PROCEDURE — 99282 EMERGENCY DEPT VISIT SF MDM: CPT

## 2025-06-25 NOTE — Clinical Note
Renan Carpenter was seen and treated in our emergency department on 6/25/2025.                Diagnosis:     Renan  may return to work on return date.    He may return on this date: 06/26/2025         If you have any questions or concerns, please don't hesitate to call.      Skinny Martinez PA-C    ______________________________           _______________          _______________  Hospital Representative                              Date                                Time

## 2025-06-25 NOTE — DISCHARGE INSTRUCTIONS
Flonase as directed on packaging.   Continue doxycycline as prescribed.  Can start daily Zyrtec or Claritin.   Increase fluids.  Tylenol 650 every 6 hours or Tylenol 1,000 mg every 8 hours as needed for pain.  Ibuprofen(Advil/Motrin) 400-600 mg every 6 hours as needed for pain.   Follow up with PCP.  Return to ER with worsening pain, increased swelling, inability to swallow, fevers, or any other concerning symptoms.

## 2025-06-25 NOTE — ED PROVIDER NOTES
"Time reflects when diagnosis was documented in both MDM as applicable and the Disposition within this note       Time User Action Codes Description Comment    6/25/2025  2:14 AM Skinny Martinez Add [J02.9] Sore throat     6/25/2025  2:14 AM Skinny Martinez Add [H92.01] Right ear pain           ED Disposition       ED Disposition   Discharge    Condition   Stable    Date/Time   Wed Jun 25, 2025  2:14 AM    Comment   Renanrosie Toneyaddis discharge to home/self care.                   Assessment & Plan       Medical Decision Making  29 year old male with a history of allergies presenting with right sided sore throat with right ear pain for the past few days. He also notes anterior cervical right sided swollen lymph nodes. Patient denies fevers. Patient states he follows with dermatology for penile lesions and he has been taking doxycycline for possible skin infection, states he has lesions \"frozen.\" States he follows weekly. He denies fevers, chills, bodyaches, chest pain, palpitations, SOB, cough, abdominal pain, NVD, urinary symptoms, visual changes, hearing changes, lightheadedness, or dizziness. Has not been taking anything for pain.     DDX including but not limited to: otitis media, effusion, viral etiology, covid/flu, strep, abscess, allergies, genital warts, herps, syphillis.    Patient with R middle ear effusion. Suspect symptoms of ear effusion, sore throat, and lymphadenopathy due to viral etiology vs allergies. Patient well appearing, doubt abscess or life threatening etiology at this time. Imaging not indicated at this time. Recommended flonase and OTC zyrtec or claritin for symptoms. Advised to continue doxycycline as prescribed. Low suspicion for syphilis, discussed possible labwork, patient states he will follow up with dermatologist. Recommended Tylenol/Motrin for pain, increase fluids. Advised to follow up with PCP. Prior to discharge, discharge instructions were discussed with patient at bedside. Patient was " provided both verbal and written instructions. Patient is understanding of the discharge instructions and is agreeable to plan of care. Return precautions were discussed with patient bedside, patient verbalized understanding of signs and symptoms that would necessitate return to the ED. All questions were answered. Patient was comfortable with the plan of care and discharged to home.       Problems Addressed:  Right ear pain: acute illness or injury  Sore throat: acute illness or injury             Medications - No data to display    ED Risk Strat Scores                    No data recorded        SBIRT 20yo+      Flowsheet Row Most Recent Value   Initial Alcohol Screen: US AUDIT-C     1. How often do you have a drink containing alcohol? 0 Filed at: 06/25/2025 0142   2. How many drinks containing alcohol do you have on a typical day you are drinking?  0 Filed at: 06/25/2025 0142   3a. Male UNDER 65: How often do you have five or more drinks on one occasion? 0 Filed at: 06/25/2025 0142   3b. FEMALE Any Age, or MALE 65+: How often do you have 4 or more drinks on one occassion? 0 Filed at: 06/25/2025 0142   Audit-C Score 0 Filed at: 06/25/2025 0142                            History of Present Illness       Chief Complaint   Patient presents with    Sore Throat     Pt c/o R sided throat pain, states swollen lymph node. Pt states painful to swallow and has been having R ear pain for few days, denies any fevers at home.        Past Medical History[1]   Past Surgical History[2]   Family History[3]   Social History[4]   E-Cigarette/Vaping    E-Cigarette Use Never User       E-Cigarette/Vaping Substances    Nicotine No     THC No     CBD No     Flavoring No     Other No     Unknown No       I have reviewed and agree with the history as documented.     29 year old male with a history of allergies presenting with right sided sore throat with right ear pain for the past few days. He also notes anterior cervical right sided  "swollen lymph nodes. Patient denies fevers. Patient states he follows with dermatology for penile lesions and he has been taking doxycycline for possible skin infection, states he has lesions \"frozen.\" States he follows weekly. He denies fevers, chills, bodyaches, chest pain, palpitations, SOB, cough, abdominal pain, NVD, urinary symptoms, visual changes, hearing changes, lightheadedness, or dizziness. Has not been taking anything for pain.       Sore Throat  Associated symptoms: ear pain (right sided)    Associated symptoms: no abdominal pain, no chest pain, no chills, no cough, no ear discharge, no fever, no headaches, no shortness of breath, no trouble swallowing and no voice change        Review of Systems   Constitutional:  Negative for chills and fever.   HENT:  Positive for ear pain (right sided) and sore throat (right sided). Negative for congestion, ear discharge, facial swelling, sinus pain, tinnitus, trouble swallowing and voice change.    Eyes:  Negative for visual disturbance.   Respiratory:  Negative for cough and shortness of breath.    Cardiovascular:  Negative for chest pain and palpitations.   Gastrointestinal:  Negative for abdominal pain, diarrhea, nausea and vomiting.   Genitourinary:  Negative for dysuria and hematuria.   Musculoskeletal:  Negative for neck pain.   Neurological:  Negative for dizziness, syncope, weakness, light-headedness, numbness and headaches.   Psychiatric/Behavioral: Negative.             Objective       ED Triage Vitals [06/25/25 0142]   Temperature Pulse Blood Pressure Respirations SpO2 Patient Position - Orthostatic VS   98 °F (36.7 °C) 61 98/63 19 99 % Sitting      Temp Source Heart Rate Source BP Location FiO2 (%) Pain Score    Oral Monitor Left arm -- 6      Vitals      Date and Time Temp Pulse SpO2 Resp BP Pain Score FACES Pain Rating User   06/25/25 0142 98 °F (36.7 °C) 61 99 % 19 98/63 6 -- EC            Physical Exam  Vitals reviewed.   Constitutional:       " General: He is not in acute distress.     Appearance: He is well-developed and normal weight. He is not ill-appearing, toxic-appearing or diaphoretic.   HENT:      Head: Normocephalic and atraumatic.      Right Ear: Ear canal normal. No swelling. A middle ear effusion is present. Tympanic membrane is not injected, erythematous, retracted or bulging.      Left Ear: Tympanic membrane and ear canal normal.      Nose: No congestion or rhinorrhea.      Mouth/Throat:      Mouth: Mucous membranes are moist.      Pharynx: Oropharynx is clear. Uvula midline. No pharyngeal swelling, oropharyngeal exudate, posterior oropharyngeal erythema or uvula swelling.      Tonsils: No tonsillar exudate or tonsillar abscesses.     Cardiovascular:      Rate and Rhythm: Normal rate and regular rhythm.      Heart sounds: Normal heart sounds. No murmur heard.  Pulmonary:      Effort: Pulmonary effort is normal.      Breath sounds: Normal breath sounds.   Abdominal:      General: Bowel sounds are normal.      Palpations: Abdomen is soft.   Genitourinary:     Comments: Small lesions present on penile shaft, no drainage or discharge, consistent with wart.    Musculoskeletal:         General: Normal range of motion.   Lymphadenopathy:      Cervical: Cervical adenopathy present.     Skin:     General: Skin is warm and dry.      Capillary Refill: Capillary refill takes less than 2 seconds.     Neurological:      General: No focal deficit present.      Mental Status: He is alert and oriented to person, place, and time.     Psychiatric:         Mood and Affect: Mood normal.         Results Reviewed       None            No orders to display       Procedures    ED Medication and Procedure Management   Prior to Admission Medications   Prescriptions Last Dose Informant Patient Reported? Taking?   busPIRone (BUSPAR) 5 mg tablet  Self Yes No   Patient not taking: Reported on 2023   fluticasone (FLONASE) 50 mcg/act nasal spray   No No   Si spray  into each nostril daily for 7 days   fluticasone (FLONASE) 50 mcg/act nasal spray   No No   Si spray into each nostril daily   Patient not taking: Reported on 5/15/2025      Facility-Administered Medications: None     Discharge Medication List as of 2025  2:16 AM        CONTINUE these medications which have NOT CHANGED    Details   busPIRone (BUSPAR) 5 mg tablet Starting Thu 2022, Historical Med      fluticasone (FLONASE) 50 mcg/act nasal spray 1 spray into each nostril daily, Starting Mon 2025, Normal           No discharge procedures on file.  ED SEPSIS DOCUMENTATION   Time reflects when diagnosis was documented in both MDM as applicable and the Disposition within this note       Time User Action Codes Description Comment    2025  2:14 AM Skinny Martinez [J02.9] Sore throat     2025  2:14 AM Skinny Martinez [H92.01] Right ear pain                    [1]   Past Medical History:  Diagnosis Date    Anxiety    [2]   Past Surgical History:  Procedure Laterality Date    NO PAST SURGERIES     [3] No family history on file.  [4]   Social History  Tobacco Use    Smoking status: Some Days     Current packs/day: 1.00     Types: Cigarettes    Smokeless tobacco: Never    Tobacco comments:     quit a week ago.    Vaping Use    Vaping status: Never Used   Substance Use Topics    Alcohol use: Yes     Comment: occ    Drug use: Never        Skinny Martinez PA-C  25 [id number]

## 2025-08-04 ENCOUNTER — OFFICE VISIT (OUTPATIENT)
Dept: URGENT CARE | Facility: CLINIC | Age: 30
End: 2025-08-04
Payer: MEDICARE

## 2025-08-04 VITALS
BODY MASS INDEX: 21.47 KG/M2 | HEART RATE: 67 BPM | OXYGEN SATURATION: 98 % | DIASTOLIC BLOOD PRESSURE: 70 MMHG | TEMPERATURE: 97.6 F | WEIGHT: 133 LBS | SYSTOLIC BLOOD PRESSURE: 110 MMHG

## 2025-08-04 DIAGNOSIS — K04.7 DENTAL ABSCESS: Primary | ICD-10-CM

## 2025-08-04 PROCEDURE — G0463 HOSPITAL OUTPT CLINIC VISIT: HCPCS

## 2025-08-04 PROCEDURE — 99213 OFFICE O/P EST LOW 20 MIN: CPT

## 2025-08-04 RX ORDER — CLINDAMYCIN HYDROCHLORIDE 300 MG/1
300 CAPSULE ORAL 3 TIMES DAILY
Qty: 21 CAPSULE | Refills: 0 | Status: SHIPPED | OUTPATIENT
Start: 2025-08-04 | End: 2025-08-11

## 2025-08-04 RX ORDER — IBUPROFEN 600 MG/1
600 TABLET, FILM COATED ORAL EVERY 6 HOURS PRN
Qty: 30 TABLET | Refills: 0 | Status: SHIPPED | OUTPATIENT
Start: 2025-08-04

## 2025-08-18 ENCOUNTER — OFFICE VISIT (OUTPATIENT)
Dept: URGENT CARE | Facility: CLINIC | Age: 30
End: 2025-08-18
Payer: MEDICARE

## 2025-08-18 VITALS
TEMPERATURE: 98 F | HEART RATE: 52 BPM | SYSTOLIC BLOOD PRESSURE: 110 MMHG | OXYGEN SATURATION: 98 % | DIASTOLIC BLOOD PRESSURE: 66 MMHG | RESPIRATION RATE: 19 BRPM | BODY MASS INDEX: 22.18 KG/M2 | WEIGHT: 137.4 LBS

## 2025-08-18 DIAGNOSIS — H65.191 ACUTE EFFUSION OF RIGHT EAR: Primary | ICD-10-CM

## 2025-08-18 PROCEDURE — 99213 OFFICE O/P EST LOW 20 MIN: CPT | Performed by: PHYSICIAN ASSISTANT

## 2025-08-18 PROCEDURE — G0463 HOSPITAL OUTPT CLINIC VISIT: HCPCS | Performed by: PHYSICIAN ASSISTANT

## 2025-08-18 RX ORDER — PREDNISONE 10 MG/1
TABLET ORAL
Qty: 20 TABLET | Refills: 0 | Status: SHIPPED | OUTPATIENT
Start: 2025-08-18 | End: 2025-08-26